# Patient Record
Sex: FEMALE | Race: WHITE | Employment: OTHER
[De-identification: names, ages, dates, MRNs, and addresses within clinical notes are randomized per-mention and may not be internally consistent; named-entity substitution may affect disease eponyms.]

---

## 2017-01-03 RX ORDER — HYDROCODONE BITARTRATE AND ACETAMINOPHEN 5; 325 MG/1; MG/1
1 TABLET ORAL EVERY 6 HOURS PRN
Qty: 40 TABLET | Refills: 0 | Status: SHIPPED | OUTPATIENT
Start: 2017-01-03 | End: 2017-02-13 | Stop reason: SDUPTHER

## 2017-02-13 RX ORDER — HYDROCODONE BITARTRATE AND ACETAMINOPHEN 5; 325 MG/1; MG/1
1 TABLET ORAL EVERY 6 HOURS PRN
Qty: 40 TABLET | Refills: 0 | Status: SHIPPED | OUTPATIENT
Start: 2017-02-13 | End: 2017-03-22 | Stop reason: SDUPTHER

## 2017-02-16 ENCOUNTER — OFFICE VISIT (OUTPATIENT)
Dept: ORTHOPEDIC SURGERY | Facility: CLINIC | Age: 72
End: 2017-02-16

## 2017-02-16 VITALS — WEIGHT: 170 LBS | BODY MASS INDEX: 29.02 KG/M2 | HEIGHT: 64 IN

## 2017-02-16 DIAGNOSIS — M17.11 PRIMARY OSTEOARTHRITIS OF RIGHT KNEE: Primary | ICD-10-CM

## 2017-02-16 PROCEDURE — 1090F PRES/ABSN URINE INCON ASSESS: CPT | Performed by: ORTHOPAEDIC SURGERY

## 2017-02-16 PROCEDURE — 1123F ACP DISCUSS/DSCN MKR DOCD: CPT | Performed by: ORTHOPAEDIC SURGERY

## 2017-02-16 PROCEDURE — 3017F COLORECTAL CA SCREEN DOC REV: CPT | Performed by: ORTHOPAEDIC SURGERY

## 2017-02-16 PROCEDURE — 4040F PNEUMOC VAC/ADMIN/RCVD: CPT | Performed by: ORTHOPAEDIC SURGERY

## 2017-02-16 PROCEDURE — 1036F TOBACCO NON-USER: CPT | Performed by: ORTHOPAEDIC SURGERY

## 2017-02-16 PROCEDURE — 3014F SCREEN MAMMO DOC REV: CPT | Performed by: ORTHOPAEDIC SURGERY

## 2017-02-16 PROCEDURE — G8427 DOCREV CUR MEDS BY ELIG CLIN: HCPCS | Performed by: ORTHOPAEDIC SURGERY

## 2017-02-16 PROCEDURE — G8400 PT W/DXA NO RESULTS DOC: HCPCS | Performed by: ORTHOPAEDIC SURGERY

## 2017-02-16 PROCEDURE — 99212 OFFICE O/P EST SF 10 MIN: CPT | Performed by: ORTHOPAEDIC SURGERY

## 2017-02-16 PROCEDURE — 20610 DRAIN/INJ JOINT/BURSA W/O US: CPT | Performed by: ORTHOPAEDIC SURGERY

## 2017-02-16 PROCEDURE — G8420 CALC BMI NORM PARAMETERS: HCPCS | Performed by: ORTHOPAEDIC SURGERY

## 2017-02-16 PROCEDURE — G8484 FLU IMMUNIZE NO ADMIN: HCPCS | Performed by: ORTHOPAEDIC SURGERY

## 2017-02-16 RX ORDER — METHYLPREDNISOLONE ACETATE 40 MG/ML
40 INJECTION, SUSPENSION INTRA-ARTICULAR; INTRALESIONAL; INTRAMUSCULAR; SOFT TISSUE ONCE
Status: COMPLETED | OUTPATIENT
Start: 2017-02-16 | End: 2017-02-16

## 2017-02-16 RX ADMIN — METHYLPREDNISOLONE ACETATE 40 MG: 40 INJECTION, SUSPENSION INTRA-ARTICULAR; INTRALESIONAL; INTRAMUSCULAR; SOFT TISSUE at 12:33

## 2017-03-22 RX ORDER — HYDROCODONE BITARTRATE AND ACETAMINOPHEN 5; 325 MG/1; MG/1
1 TABLET ORAL EVERY 6 HOURS PRN
Qty: 40 TABLET | Refills: 0 | Status: ON HOLD | OUTPATIENT
Start: 2017-03-22 | End: 2017-05-22 | Stop reason: HOSPADM

## 2017-03-28 ENCOUNTER — APPOINTMENT (OUTPATIENT)
Dept: GENERAL RADIOLOGY | Age: 72
End: 2017-03-28
Payer: MEDICARE

## 2017-03-28 ENCOUNTER — HOSPITAL ENCOUNTER (OUTPATIENT)
Dept: GENERAL RADIOLOGY | Age: 72
Discharge: HOME | End: 2017-03-28
Payer: MEDICARE

## 2017-03-28 ENCOUNTER — HOSPITAL ENCOUNTER (OUTPATIENT)
Age: 72
End: 2017-03-28
Payer: MEDICARE

## 2017-03-28 DIAGNOSIS — M17.11 PRIMARY OSTEOARTHRITIS OF RIGHT KNEE: ICD-10-CM

## 2017-03-28 PROCEDURE — 77073 BONE LENGTH STUDIES: CPT

## 2017-04-06 ENCOUNTER — OFFICE VISIT (OUTPATIENT)
Dept: ORTHOPEDIC SURGERY | Age: 72
End: 2017-04-06
Payer: MEDICARE

## 2017-04-06 VITALS — WEIGHT: 169.97 LBS | HEIGHT: 64 IN | BODY MASS INDEX: 29.02 KG/M2

## 2017-04-06 DIAGNOSIS — M17.11 PRIMARY OSTEOARTHRITIS OF RIGHT KNEE: Primary | ICD-10-CM

## 2017-04-06 PROCEDURE — 4040F PNEUMOC VAC/ADMIN/RCVD: CPT | Performed by: ORTHOPAEDIC SURGERY

## 2017-04-06 PROCEDURE — 1123F ACP DISCUSS/DSCN MKR DOCD: CPT | Performed by: ORTHOPAEDIC SURGERY

## 2017-04-06 PROCEDURE — G8420 CALC BMI NORM PARAMETERS: HCPCS | Performed by: ORTHOPAEDIC SURGERY

## 2017-04-06 PROCEDURE — 3017F COLORECTAL CA SCREEN DOC REV: CPT | Performed by: ORTHOPAEDIC SURGERY

## 2017-04-06 PROCEDURE — 1036F TOBACCO NON-USER: CPT | Performed by: ORTHOPAEDIC SURGERY

## 2017-04-06 PROCEDURE — 99213 OFFICE O/P EST LOW 20 MIN: CPT | Performed by: ORTHOPAEDIC SURGERY

## 2017-04-06 PROCEDURE — 1090F PRES/ABSN URINE INCON ASSESS: CPT | Performed by: ORTHOPAEDIC SURGERY

## 2017-04-06 PROCEDURE — 3014F SCREEN MAMMO DOC REV: CPT | Performed by: ORTHOPAEDIC SURGERY

## 2017-04-06 PROCEDURE — G8400 PT W/DXA NO RESULTS DOC: HCPCS | Performed by: ORTHOPAEDIC SURGERY

## 2017-04-06 PROCEDURE — G8427 DOCREV CUR MEDS BY ELIG CLIN: HCPCS | Performed by: ORTHOPAEDIC SURGERY

## 2017-04-25 ENCOUNTER — HOSPITAL ENCOUNTER (OUTPATIENT)
Age: 72
Discharge: HOME | End: 2017-04-25
Payer: MEDICARE

## 2017-05-03 ENCOUNTER — TELEPHONE (OUTPATIENT)
Dept: ORTHOPEDIC SURGERY | Age: 72
End: 2017-05-03

## 2017-05-10 ENCOUNTER — HOSPITAL ENCOUNTER (OUTPATIENT)
Age: 72
Discharge: HOME | End: 2017-05-10
Payer: MEDICARE

## 2017-05-10 ENCOUNTER — HOSPITAL ENCOUNTER (OUTPATIENT)
Dept: PREADMISSION TESTING | Age: 72
Discharge: HOME | End: 2017-05-10
Payer: MEDICARE

## 2017-05-10 VITALS
WEIGHT: 172.84 LBS | OXYGEN SATURATION: 98 % | HEART RATE: 84 BPM | RESPIRATION RATE: 18 BRPM | HEIGHT: 64 IN | TEMPERATURE: 98.1 F | BODY MASS INDEX: 29.51 KG/M2 | SYSTOLIC BLOOD PRESSURE: 118 MMHG | DIASTOLIC BLOOD PRESSURE: 72 MMHG

## 2017-05-10 LAB
ABO/RH: NORMAL
ABSOLUTE EOS #: 0.2 K/UL (ref 0–0.4)
ABSOLUTE LYMPH #: 1.4 K/UL (ref 1–4.8)
ABSOLUTE MONO #: 0.9 K/UL (ref 0.1–1.2)
ANION GAP SERPL CALCULATED.3IONS-SCNC: 14 MMOL/L (ref 9–17)
ANTIBODY SCREEN: NEGATIVE
ARM BAND NUMBER: NORMAL
BASOPHILS # BLD: 0 %
BASOPHILS ABSOLUTE: 0 K/UL (ref 0–0.2)
BUN BLDV-MCNC: 20 MG/DL (ref 8–23)
CHLORIDE BLD-SCNC: 101 MMOL/L (ref 98–107)
CO2: 25 MMOL/L (ref 20–31)
CREAT SERPL-MCNC: 1.03 MG/DL (ref 0.5–0.9)
DIFFERENTIAL TYPE: ABNORMAL
EOSINOPHILS RELATIVE PERCENT: 2 %
EXPIRATION DATE: NORMAL
GFR AFRICAN AMERICAN: >60 ML/MIN
GFR NON-AFRICAN AMERICAN: 53 ML/MIN
GFR SERPL CREATININE-BSD FRML MDRD: ABNORMAL ML/MIN/{1.73_M2}
GFR SERPL CREATININE-BSD FRML MDRD: ABNORMAL ML/MIN/{1.73_M2}
GLUCOSE BLD-MCNC: 100 MG/DL (ref 70–99)
HCT VFR BLD CALC: 33 % (ref 36–46)
HEMOGLOBIN: 11 G/DL (ref 12–16)
LYMPHOCYTES # BLD: 18 %
MCH RBC QN AUTO: 30.7 PG (ref 26–34)
MCHC RBC AUTO-ENTMCNC: 33.2 G/DL (ref 31–37)
MCV RBC AUTO: 92.5 FL (ref 80–100)
MONOCYTES # BLD: 12 %
PDW BLD-RTO: 15.9 % (ref 12.5–15.4)
PLATELET # BLD: 299 K/UL (ref 140–450)
PLATELET ESTIMATE: ABNORMAL
PMV BLD AUTO: 8.2 FL (ref 6–12)
POTASSIUM SERPL-SCNC: 5.1 MMOL/L (ref 3.7–5.3)
RBC # BLD: 3.57 M/UL (ref 4–5.2)
RBC # BLD: ABNORMAL 10*6/UL
SEG NEUTROPHILS: 68 %
SEGMENTED NEUTROPHILS ABSOLUTE COUNT: 5.1 K/UL (ref 1.8–7.7)
SODIUM BLD-SCNC: 140 MMOL/L (ref 135–144)
WBC # BLD: 7.6 K/UL (ref 3.5–11)
WBC # BLD: ABNORMAL 10*3/UL

## 2017-05-10 PROCEDURE — 86900 BLOOD TYPING SEROLOGIC ABO: CPT

## 2017-05-10 PROCEDURE — 82947 ASSAY GLUCOSE BLOOD QUANT: CPT

## 2017-05-10 PROCEDURE — 86901 BLOOD TYPING SEROLOGIC RH(D): CPT

## 2017-05-10 PROCEDURE — 84520 ASSAY OF UREA NITROGEN: CPT

## 2017-05-10 PROCEDURE — 80051 ELECTROLYTE PANEL: CPT

## 2017-05-10 PROCEDURE — 82565 ASSAY OF CREATININE: CPT

## 2017-05-10 PROCEDURE — 85025 COMPLETE CBC W/AUTO DIFF WBC: CPT

## 2017-05-10 PROCEDURE — 93005 ELECTROCARDIOGRAM TRACING: CPT

## 2017-05-10 PROCEDURE — 86850 RBC ANTIBODY SCREEN: CPT

## 2017-05-10 RX ORDER — SODIUM CHLORIDE, SODIUM LACTATE, POTASSIUM CHLORIDE, CALCIUM CHLORIDE 600; 310; 30; 20 MG/100ML; MG/100ML; MG/100ML; MG/100ML
1000 INJECTION, SOLUTION INTRAVENOUS CONTINUOUS
Status: CANCELLED | OUTPATIENT
Start: 2017-05-10

## 2017-05-10 ASSESSMENT — PAIN DESCRIPTION - ORIENTATION: ORIENTATION: RIGHT

## 2017-05-10 ASSESSMENT — PAIN SCALES - GENERAL: PAINLEVEL_OUTOF10: 4

## 2017-05-10 ASSESSMENT — PAIN DESCRIPTION - LOCATION: LOCATION: KNEE

## 2017-05-11 LAB
EKG ATRIAL RATE: 66 BPM
EKG P AXIS: 25 DEGREES
EKG P-R INTERVAL: 156 MS
EKG Q-T INTERVAL: 376 MS
EKG QRS DURATION: 82 MS
EKG QTC CALCULATION (BAZETT): 394 MS
EKG R AXIS: 26 DEGREES
EKG T AXIS: 53 DEGREES
EKG VENTRICULAR RATE: 66 BPM

## 2017-05-15 ENCOUNTER — TELEPHONE (OUTPATIENT)
Dept: CASE MANAGEMENT | Age: 72
End: 2017-05-15

## 2017-05-19 ENCOUNTER — ANESTHESIA (OUTPATIENT)
Dept: OPERATING ROOM | Age: 72
DRG: 470 | End: 2017-05-19
Payer: MEDICARE

## 2017-05-19 ENCOUNTER — ANESTHESIA EVENT (OUTPATIENT)
Dept: OPERATING ROOM | Age: 72
DRG: 470 | End: 2017-05-19
Payer: MEDICARE

## 2017-05-19 ENCOUNTER — APPOINTMENT (OUTPATIENT)
Dept: GENERAL RADIOLOGY | Age: 72
DRG: 470 | End: 2017-05-19
Attending: ORTHOPAEDIC SURGERY
Payer: MEDICARE

## 2017-05-19 ENCOUNTER — HOSPITAL ENCOUNTER (INPATIENT)
Age: 72
LOS: 3 days | Discharge: HOME | DRG: 470 | End: 2017-05-22
Attending: ORTHOPAEDIC SURGERY | Admitting: ORTHOPAEDIC SURGERY
Payer: MEDICARE

## 2017-05-19 VITALS — TEMPERATURE: 91.1 F | OXYGEN SATURATION: 96 % | DIASTOLIC BLOOD PRESSURE: 28 MMHG | SYSTOLIC BLOOD PRESSURE: 101 MMHG

## 2017-05-19 DIAGNOSIS — Z01.818 PRE-OP TESTING: ICD-10-CM

## 2017-05-19 LAB — POC POTASSIUM: 4.3 MMOL/L (ref 3.5–5.1)

## 2017-05-19 PROCEDURE — 2580000003 HC RX 258: Performed by: ORTHOPAEDIC SURGERY

## 2017-05-19 PROCEDURE — 7100000000 HC PACU RECOVERY - FIRST 15 MIN: Performed by: ORTHOPAEDIC SURGERY

## 2017-05-19 PROCEDURE — 0SRC0J9 REPLACEMENT OF RIGHT KNEE JOINT WITH SYNTHETIC SUBSTITUTE, CEMENTED, OPEN APPROACH: ICD-10-PCS | Performed by: ORTHOPAEDIC SURGERY

## 2017-05-19 PROCEDURE — 6370000000 HC RX 637 (ALT 250 FOR IP): Performed by: ORTHOPAEDIC SURGERY

## 2017-05-19 PROCEDURE — 2500000003 HC RX 250 WO HCPCS: Performed by: ORTHOPAEDIC SURGERY

## 2017-05-19 PROCEDURE — 84132 ASSAY OF SERUM POTASSIUM: CPT

## 2017-05-19 PROCEDURE — 87086 URINE CULTURE/COLONY COUNT: CPT

## 2017-05-19 PROCEDURE — 3600000004 HC SURGERY LEVEL 4 BASE: Performed by: ORTHOPAEDIC SURGERY

## 2017-05-19 PROCEDURE — 2580000003 HC RX 258: Performed by: NURSE ANESTHETIST, CERTIFIED REGISTERED

## 2017-05-19 PROCEDURE — 76942 ECHO GUIDE FOR BIOPSY: CPT | Performed by: ANESTHESIOLOGY

## 2017-05-19 PROCEDURE — 6360000002 HC RX W HCPCS: Performed by: ANESTHESIOLOGY

## 2017-05-19 PROCEDURE — 7100000001 HC PACU RECOVERY - ADDTL 15 MIN: Performed by: ORTHOPAEDIC SURGERY

## 2017-05-19 PROCEDURE — 6370000000 HC RX 637 (ALT 250 FOR IP): Performed by: NURSE ANESTHETIST, CERTIFIED REGISTERED

## 2017-05-19 PROCEDURE — C1713 ANCHOR/SCREW BN/BN,TIS/BN: HCPCS | Performed by: ORTHOPAEDIC SURGERY

## 2017-05-19 PROCEDURE — 2580000003 HC RX 258: Performed by: STUDENT IN AN ORGANIZED HEALTH CARE EDUCATION/TRAINING PROGRAM

## 2017-05-19 PROCEDURE — 2580000003 HC RX 258: Performed by: ANESTHESIOLOGY

## 2017-05-19 PROCEDURE — C1776 JOINT DEVICE (IMPLANTABLE): HCPCS | Performed by: ORTHOPAEDIC SURGERY

## 2017-05-19 PROCEDURE — 3600000014 HC SURGERY LEVEL 4 ADDTL 15MIN: Performed by: ORTHOPAEDIC SURGERY

## 2017-05-19 PROCEDURE — 73562 X-RAY EXAM OF KNEE 3: CPT

## 2017-05-19 PROCEDURE — 3700000000 HC ANESTHESIA ATTENDED CARE: Performed by: ORTHOPAEDIC SURGERY

## 2017-05-19 PROCEDURE — 6360000002 HC RX W HCPCS: Performed by: ORTHOPAEDIC SURGERY

## 2017-05-19 PROCEDURE — 3700000001 HC ADD 15 MINUTES (ANESTHESIA): Performed by: ORTHOPAEDIC SURGERY

## 2017-05-19 PROCEDURE — 6360000002 HC RX W HCPCS: Performed by: NURSE ANESTHETIST, CERTIFIED REGISTERED

## 2017-05-19 PROCEDURE — 1200000000 HC SEMI PRIVATE

## 2017-05-19 PROCEDURE — 2500000003 HC RX 250 WO HCPCS: Performed by: NURSE ANESTHETIST, CERTIFIED REGISTERED

## 2017-05-19 PROCEDURE — 2720000010 HC SURG SUPPLY STERILE: Performed by: ORTHOPAEDIC SURGERY

## 2017-05-19 PROCEDURE — 2500000003 HC RX 250 WO HCPCS: Performed by: ANESTHESIOLOGY

## 2017-05-19 DEVICE — IMPLANTABLE DEVICE: Type: IMPLANTABLE DEVICE | Status: FUNCTIONAL

## 2017-05-19 DEVICE — NEXGEN ALL-POLY PATELLA 32MM: Type: IMPLANTABLE DEVICE | Site: KNEE | Status: FUNCTIONAL

## 2017-05-19 DEVICE — NEXGEN PRECOAT STEMMED TIBIAL PLATE SZ 4: Type: IMPLANTABLE DEVICE | Site: KNEE | Status: FUNCTIONAL

## 2017-05-19 DEVICE — CEMENT BNE 40 GM RADIOPAQUE BA SIMPLEX P: Type: IMPLANTABLE DEVICE | Site: KNEE | Status: FUNCTIONAL

## 2017-05-19 DEVICE — IMPLANTABLE DEVICE: Type: IMPLANTABLE DEVICE | Site: KNEE | Status: FUNCTIONAL

## 2017-05-19 RX ORDER — FENTANYL CITRATE 50 UG/ML
INJECTION, SOLUTION INTRAMUSCULAR; INTRAVENOUS PRN
Status: DISCONTINUED | OUTPATIENT
Start: 2017-05-19 | End: 2017-05-19 | Stop reason: SDUPTHER

## 2017-05-19 RX ORDER — SODIUM CHLORIDE 0.9 % (FLUSH) 0.9 %
10 SYRINGE (ML) INJECTION EVERY 12 HOURS SCHEDULED
Status: DISCONTINUED | OUTPATIENT
Start: 2017-05-19 | End: 2017-05-22 | Stop reason: HOSPADM

## 2017-05-19 RX ORDER — LIDOCAINE HYDROCHLORIDE 10 MG/ML
INJECTION, SOLUTION EPIDURAL; INFILTRATION; INTRACAUDAL; PERINEURAL PRN
Status: DISCONTINUED | OUTPATIENT
Start: 2017-05-19 | End: 2017-05-19 | Stop reason: SDUPTHER

## 2017-05-19 RX ORDER — OXYCODONE HYDROCHLORIDE AND ACETAMINOPHEN 5; 325 MG/1; MG/1
2 TABLET ORAL EVERY 4 HOURS PRN
Status: DISCONTINUED | OUTPATIENT
Start: 2017-05-19 | End: 2017-05-20

## 2017-05-19 RX ORDER — ONDANSETRON 2 MG/ML
INJECTION INTRAMUSCULAR; INTRAVENOUS PRN
Status: DISCONTINUED | OUTPATIENT
Start: 2017-05-19 | End: 2017-05-19 | Stop reason: SDUPTHER

## 2017-05-19 RX ORDER — MIDAZOLAM HYDROCHLORIDE 1 MG/ML
2 INJECTION INTRAMUSCULAR; INTRAVENOUS ONCE
Status: COMPLETED | OUTPATIENT
Start: 2017-05-19 | End: 2017-05-19

## 2017-05-19 RX ORDER — SODIUM CHLORIDE, SODIUM LACTATE, POTASSIUM CHLORIDE, CALCIUM CHLORIDE 600; 310; 30; 20 MG/100ML; MG/100ML; MG/100ML; MG/100ML
1000 INJECTION, SOLUTION INTRAVENOUS CONTINUOUS
Status: DISCONTINUED | OUTPATIENT
Start: 2017-05-19 | End: 2017-05-19

## 2017-05-19 RX ORDER — ONDANSETRON 2 MG/ML
4 INJECTION INTRAMUSCULAR; INTRAVENOUS EVERY 6 HOURS PRN
Status: DISCONTINUED | OUTPATIENT
Start: 2017-05-19 | End: 2017-05-22 | Stop reason: HOSPADM

## 2017-05-19 RX ORDER — BUPROPION HYDROCHLORIDE 150 MG/1
300 TABLET ORAL EVERY MORNING
Status: DISCONTINUED | OUTPATIENT
Start: 2017-05-20 | End: 2017-05-22 | Stop reason: HOSPADM

## 2017-05-19 RX ORDER — MORPHINE SULFATE 2 MG/ML
2 INJECTION, SOLUTION INTRAMUSCULAR; INTRAVENOUS
Status: DISCONTINUED | OUTPATIENT
Start: 2017-05-19 | End: 2017-05-22 | Stop reason: HOSPADM

## 2017-05-19 RX ORDER — FENTANYL CITRATE 50 UG/ML
100 INJECTION, SOLUTION INTRAMUSCULAR; INTRAVENOUS ONCE
Status: COMPLETED | OUTPATIENT
Start: 2017-05-19 | End: 2017-05-19

## 2017-05-19 RX ORDER — SODIUM CHLORIDE 9 MG/ML
INJECTION, SOLUTION INTRAVENOUS CONTINUOUS
Status: DISCONTINUED | OUTPATIENT
Start: 2017-05-19 | End: 2017-05-21

## 2017-05-19 RX ORDER — FUROSEMIDE 20 MG/1
20 TABLET ORAL DAILY
Status: DISCONTINUED | OUTPATIENT
Start: 2017-05-19 | End: 2017-05-22 | Stop reason: HOSPADM

## 2017-05-19 RX ORDER — SCOLOPAMINE TRANSDERMAL SYSTEM 1 MG/1
1 PATCH, EXTENDED RELEASE TRANSDERMAL ONCE
Status: COMPLETED | OUTPATIENT
Start: 2017-05-19 | End: 2017-05-22

## 2017-05-19 RX ORDER — PROPOFOL 10 MG/ML
INJECTION, EMULSION INTRAVENOUS CONTINUOUS PRN
Status: DISCONTINUED | OUTPATIENT
Start: 2017-05-19 | End: 2017-05-19 | Stop reason: SDUPTHER

## 2017-05-19 RX ORDER — PROPOFOL 10 MG/ML
INJECTION, EMULSION INTRAVENOUS PRN
Status: DISCONTINUED | OUTPATIENT
Start: 2017-05-19 | End: 2017-05-19 | Stop reason: SDUPTHER

## 2017-05-19 RX ORDER — ONDANSETRON 2 MG/ML
4 INJECTION INTRAMUSCULAR; INTRAVENOUS
Status: DISCONTINUED | OUTPATIENT
Start: 2017-05-19 | End: 2017-05-19 | Stop reason: HOSPADM

## 2017-05-19 RX ORDER — ASPIRIN 81 MG/1
81 TABLET, CHEWABLE ORAL DAILY
Status: DISCONTINUED | OUTPATIENT
Start: 2017-05-19 | End: 2017-05-22 | Stop reason: HOSPADM

## 2017-05-19 RX ORDER — OXYCODONE HYDROCHLORIDE AND ACETAMINOPHEN 5; 325 MG/1; MG/1
1 TABLET ORAL EVERY 4 HOURS PRN
Status: DISCONTINUED | OUTPATIENT
Start: 2017-05-19 | End: 2017-05-20

## 2017-05-19 RX ORDER — BUPIVACAINE HYDROCHLORIDE AND EPINEPHRINE 5; 5 MG/ML; UG/ML
INJECTION, SOLUTION EPIDURAL; INTRACAUDAL; PERINEURAL PRN
Status: DISCONTINUED | OUTPATIENT
Start: 2017-05-19 | End: 2017-05-19 | Stop reason: HOSPADM

## 2017-05-19 RX ORDER — PROMETHAZINE HYDROCHLORIDE 25 MG/ML
12.5 INJECTION, SOLUTION INTRAMUSCULAR; INTRAVENOUS
Status: DISCONTINUED | OUTPATIENT
Start: 2017-05-19 | End: 2017-05-19 | Stop reason: HOSPADM

## 2017-05-19 RX ORDER — ACETAMINOPHEN 325 MG/1
650 TABLET ORAL EVERY 4 HOURS PRN
Status: DISCONTINUED | OUTPATIENT
Start: 2017-05-19 | End: 2017-05-22 | Stop reason: HOSPADM

## 2017-05-19 RX ORDER — LISINOPRIL 20 MG/1
20 TABLET ORAL DAILY
Status: DISCONTINUED | OUTPATIENT
Start: 2017-05-19 | End: 2017-05-22 | Stop reason: HOSPADM

## 2017-05-19 RX ORDER — SODIUM CHLORIDE 0.9 % (FLUSH) 0.9 %
10 SYRINGE (ML) INJECTION PRN
Status: DISCONTINUED | OUTPATIENT
Start: 2017-05-19 | End: 2017-05-22 | Stop reason: HOSPADM

## 2017-05-19 RX ORDER — DEXAMETHASONE SODIUM PHOSPHATE 10 MG/ML
INJECTION INTRAMUSCULAR; INTRAVENOUS PRN
Status: DISCONTINUED | OUTPATIENT
Start: 2017-05-19 | End: 2017-05-19 | Stop reason: SDUPTHER

## 2017-05-19 RX ORDER — EPHEDRINE SULFATE 50 MG/ML
INJECTION, SOLUTION INTRAVENOUS PRN
Status: DISCONTINUED | OUTPATIENT
Start: 2017-05-19 | End: 2017-05-19 | Stop reason: SDUPTHER

## 2017-05-19 RX ORDER — ALENDRONATE SODIUM 70 MG/1
70 TABLET ORAL
Status: DISCONTINUED | OUTPATIENT
Start: 2017-05-22 | End: 2017-05-22 | Stop reason: HOSPADM

## 2017-05-19 RX ORDER — DIPHENHYDRAMINE HYDROCHLORIDE 50 MG/ML
12.5 INJECTION INTRAMUSCULAR; INTRAVENOUS
Status: DISCONTINUED | OUTPATIENT
Start: 2017-05-19 | End: 2017-05-19 | Stop reason: HOSPADM

## 2017-05-19 RX ORDER — MAGNESIUM HYDROXIDE 1200 MG/15ML
LIQUID ORAL CONTINUOUS PRN
Status: DISCONTINUED | OUTPATIENT
Start: 2017-05-19 | End: 2017-05-19 | Stop reason: HOSPADM

## 2017-05-19 RX ORDER — SODIUM CHLORIDE, SODIUM LACTATE, POTASSIUM CHLORIDE, CALCIUM CHLORIDE 600; 310; 30; 20 MG/100ML; MG/100ML; MG/100ML; MG/100ML
INJECTION, SOLUTION INTRAVENOUS CONTINUOUS PRN
Status: DISCONTINUED | OUTPATIENT
Start: 2017-05-19 | End: 2017-05-19 | Stop reason: SDUPTHER

## 2017-05-19 RX ADMIN — EPHEDRINE SULFATE 10 MG: 50 INJECTION, SOLUTION INTRAMUSCULAR; INTRAVENOUS; SUBCUTANEOUS at 09:34

## 2017-05-19 RX ADMIN — Medication 550 ML: at 13:15

## 2017-05-19 RX ADMIN — FENTANYL CITRATE 100 MCG: 50 INJECTION, SOLUTION INTRAMUSCULAR; INTRAVENOUS at 08:08

## 2017-05-19 RX ADMIN — FENTANYL CITRATE 30 MCG: 50 INJECTION INTRAMUSCULAR; INTRAVENOUS at 09:00

## 2017-05-19 RX ADMIN — PROPOFOL 100 MG: 10 INJECTION, EMULSION INTRAVENOUS at 09:02

## 2017-05-19 RX ADMIN — PROPOFOL 100 MG: 10 INJECTION, EMULSION INTRAVENOUS at 09:30

## 2017-05-19 RX ADMIN — LIDOCAINE HYDROCHLORIDE 50 MG: 10 INJECTION, SOLUTION EPIDURAL; INFILTRATION; INTRACAUDAL; PERINEURAL at 09:00

## 2017-05-19 RX ADMIN — CEFAZOLIN SODIUM 1 G: 1 INJECTION, SOLUTION INTRAVENOUS at 18:07

## 2017-05-19 RX ADMIN — EPHEDRINE SULFATE 10 MG: 50 INJECTION, SOLUTION INTRAMUSCULAR; INTRAVENOUS; SUBCUTANEOUS at 10:00

## 2017-05-19 RX ADMIN — EPHEDRINE SULFATE 10 MG: 50 INJECTION, SOLUTION INTRAMUSCULAR; INTRAVENOUS; SUBCUTANEOUS at 11:40

## 2017-05-19 RX ADMIN — PROPOFOL 50 MG: 10 INJECTION, EMULSION INTRAVENOUS at 09:34

## 2017-05-19 RX ADMIN — EPHEDRINE SULFATE 10 MG: 50 INJECTION, SOLUTION INTRAMUSCULAR; INTRAVENOUS; SUBCUTANEOUS at 09:55

## 2017-05-19 RX ADMIN — DEXAMETHASONE SODIUM PHOSPHATE 10 MG: 10 INJECTION INTRAMUSCULAR; INTRAVENOUS at 09:35

## 2017-05-19 RX ADMIN — OXYCODONE HYDROCHLORIDE AND ACETAMINOPHEN 2 TABLET: 5; 325 TABLET ORAL at 22:44

## 2017-05-19 RX ADMIN — OXYCODONE HYDROCHLORIDE AND ACETAMINOPHEN 1 TABLET: 5; 325 TABLET ORAL at 19:03

## 2017-05-19 RX ADMIN — Medication 2 G: at 09:10

## 2017-05-19 RX ADMIN — PROPOFOL 125 MCG/KG/MIN: 10 INJECTION, EMULSION INTRAVENOUS at 09:00

## 2017-05-19 RX ADMIN — EPHEDRINE SULFATE 10 MG: 50 INJECTION, SOLUTION INTRAMUSCULAR; INTRAVENOUS; SUBCUTANEOUS at 09:23

## 2017-05-19 RX ADMIN — SODIUM CHLORIDE, POTASSIUM CHLORIDE, SODIUM LACTATE AND CALCIUM CHLORIDE 1000 ML: 600; 310; 30; 20 INJECTION, SOLUTION INTRAVENOUS at 13:05

## 2017-05-19 RX ADMIN — ONDANSETRON 4 MG: 2 INJECTION, SOLUTION INTRAMUSCULAR; INTRAVENOUS at 09:10

## 2017-05-19 RX ADMIN — MIDAZOLAM HYDROCHLORIDE 2 MG: 1 INJECTION, SOLUTION INTRAMUSCULAR; INTRAVENOUS at 08:08

## 2017-05-19 RX ADMIN — SODIUM CHLORIDE: 9 INJECTION, SOLUTION INTRAVENOUS at 22:44

## 2017-05-19 RX ADMIN — SODIUM CHLORIDE, POTASSIUM CHLORIDE, SODIUM LACTATE AND CALCIUM CHLORIDE 1000 ML: 600; 310; 30; 20 INJECTION, SOLUTION INTRAVENOUS at 07:33

## 2017-05-19 RX ADMIN — SODIUM CHLORIDE, POTASSIUM CHLORIDE, SODIUM LACTATE AND CALCIUM CHLORIDE: 600; 310; 30; 20 INJECTION, SOLUTION INTRAVENOUS at 09:10

## 2017-05-19 RX ADMIN — Medication 10 ML: at 20:01

## 2017-05-19 RX ADMIN — FENTANYL CITRATE 20 MCG: 50 INJECTION INTRAMUSCULAR; INTRAVENOUS at 08:59

## 2017-05-19 ASSESSMENT — PAIN SCALES - GENERAL
PAINLEVEL_OUTOF10: 7
PAINLEVEL_OUTOF10: 1
PAINLEVEL_OUTOF10: 0
PAINLEVEL_OUTOF10: 2
PAINLEVEL_OUTOF10: 1
PAINLEVEL_OUTOF10: 1
PAINLEVEL_OUTOF10: 5
PAINLEVEL_OUTOF10: 1
PAINLEVEL_OUTOF10: 4
PAINLEVEL_OUTOF10: 4
PAINLEVEL_OUTOF10: 5
PAINLEVEL_OUTOF10: 0
PAINLEVEL_OUTOF10: 4
PAINLEVEL_OUTOF10: 5
PAINLEVEL_OUTOF10: 0

## 2017-05-19 ASSESSMENT — PAIN DESCRIPTION - DESCRIPTORS
DESCRIPTORS: ACHING
DESCRIPTORS: ACHING
DESCRIPTORS: BURNING

## 2017-05-19 ASSESSMENT — PAIN DESCRIPTION - ORIENTATION
ORIENTATION: RIGHT

## 2017-05-19 ASSESSMENT — PAIN - FUNCTIONAL ASSESSMENT: PAIN_FUNCTIONAL_ASSESSMENT: 0-10

## 2017-05-19 ASSESSMENT — PAIN DESCRIPTION - PROGRESSION
CLINICAL_PROGRESSION: NOT CHANGED
CLINICAL_PROGRESSION: GRADUALLY WORSENING

## 2017-05-19 ASSESSMENT — PAIN DESCRIPTION - PAIN TYPE
TYPE: ACUTE PAIN;SURGICAL PAIN
TYPE: SURGICAL PAIN
TYPE: SURGICAL PAIN

## 2017-05-19 ASSESSMENT — PAIN DESCRIPTION - LOCATION
LOCATION: KNEE

## 2017-05-19 ASSESSMENT — PAIN DESCRIPTION - ONSET: ONSET: PROGRESSIVE

## 2017-05-19 ASSESSMENT — PAIN DESCRIPTION - FREQUENCY: FREQUENCY: CONTINUOUS

## 2017-05-20 LAB
ABSOLUTE EOS #: 0.13 K/UL (ref 0–0.4)
ABSOLUTE LYMPH #: 1.6 K/UL (ref 1–4.8)
ABSOLUTE MONO #: 1.6 K/UL (ref 0.1–0.8)
BASOPHILS # BLD: 0 %
BASOPHILS ABSOLUTE: 0 K/UL (ref 0–0.2)
CULTURE: NO GROWTH
CULTURE: NORMAL
DIFFERENTIAL TYPE: ABNORMAL
EOSINOPHILS RELATIVE PERCENT: 1 %
HCT VFR BLD CALC: 24.2 % (ref 36–46)
HEMOGLOBIN: 7.9 G/DL (ref 12–16)
LYMPHOCYTES # BLD: 12 %
Lab: NORMAL
MCH RBC QN AUTO: 30.6 PG (ref 26–34)
MCHC RBC AUTO-ENTMCNC: 32.6 G/DL (ref 31–37)
MCV RBC AUTO: 93.7 FL (ref 80–100)
MONOCYTES # BLD: 12 %
MORPHOLOGY: ABNORMAL
PDW BLD-RTO: 16.1 % (ref 12.5–15.4)
PLATELET # BLD: 220 K/UL (ref 140–450)
PLATELET ESTIMATE: ABNORMAL
PMV BLD AUTO: 8 FL (ref 6–12)
RBC # BLD: 2.58 M/UL (ref 4–5.2)
RBC # BLD: ABNORMAL 10*6/UL
SEG NEUTROPHILS: 75 %
SEGMENTED NEUTROPHILS ABSOLUTE COUNT: 9.97 K/UL (ref 1.8–7.7)
SPECIMEN DESCRIPTION: NORMAL
STATUS: NORMAL
WBC # BLD: 13.3 K/UL (ref 3.5–11)
WBC # BLD: ABNORMAL 10*3/UL

## 2017-05-20 PROCEDURE — G8988 SELF CARE GOAL STATUS: HCPCS

## 2017-05-20 PROCEDURE — 97116 GAIT TRAINING THERAPY: CPT

## 2017-05-20 PROCEDURE — 6370000000 HC RX 637 (ALT 250 FOR IP): Performed by: ORTHOPAEDIC SURGERY

## 2017-05-20 PROCEDURE — G8987 SELF CARE CURRENT STATUS: HCPCS

## 2017-05-20 PROCEDURE — 97166 OT EVAL MOD COMPLEX 45 MIN: CPT

## 2017-05-20 PROCEDURE — 97162 PT EVAL MOD COMPLEX 30 MIN: CPT

## 2017-05-20 PROCEDURE — 6360000002 HC RX W HCPCS: Performed by: ORTHOPAEDIC SURGERY

## 2017-05-20 PROCEDURE — 1200000000 HC SEMI PRIVATE

## 2017-05-20 PROCEDURE — 36415 COLL VENOUS BLD VENIPUNCTURE: CPT

## 2017-05-20 PROCEDURE — G8978 MOBILITY CURRENT STATUS: HCPCS

## 2017-05-20 PROCEDURE — 85025 COMPLETE CBC W/AUTO DIFF WBC: CPT

## 2017-05-20 PROCEDURE — 97530 THERAPEUTIC ACTIVITIES: CPT

## 2017-05-20 PROCEDURE — 97535 SELF CARE MNGMENT TRAINING: CPT

## 2017-05-20 PROCEDURE — G8979 MOBILITY GOAL STATUS: HCPCS

## 2017-05-20 RX ORDER — OXYCODONE HYDROCHLORIDE 5 MG/1
10 TABLET ORAL EVERY 4 HOURS PRN
Status: DISCONTINUED | OUTPATIENT
Start: 2017-05-20 | End: 2017-05-22 | Stop reason: HOSPADM

## 2017-05-20 RX ORDER — OXYCODONE HYDROCHLORIDE 5 MG/1
5 TABLET ORAL EVERY 4 HOURS PRN
Status: DISCONTINUED | OUTPATIENT
Start: 2017-05-20 | End: 2017-05-22 | Stop reason: HOSPADM

## 2017-05-20 RX ADMIN — CEFAZOLIN SODIUM 1 G: 1 INJECTION, SOLUTION INTRAVENOUS at 08:46

## 2017-05-20 RX ADMIN — OXYCODONE HYDROCHLORIDE AND ACETAMINOPHEN 2 TABLET: 5; 325 TABLET ORAL at 02:21

## 2017-05-20 RX ADMIN — OXYCODONE HYDROCHLORIDE AND ACETAMINOPHEN 2 TABLET: 5; 325 TABLET ORAL at 11:02

## 2017-05-20 RX ADMIN — ASPIRIN 81 MG: 81 TABLET, CHEWABLE ORAL at 08:46

## 2017-05-20 RX ADMIN — OXYCODONE HYDROCHLORIDE 10 MG: 5 TABLET ORAL at 22:33

## 2017-05-20 RX ADMIN — OXYCODONE HYDROCHLORIDE AND ACETAMINOPHEN 2 TABLET: 5; 325 TABLET ORAL at 14:44

## 2017-05-20 RX ADMIN — OXYCODONE HYDROCHLORIDE AND ACETAMINOPHEN 2 TABLET: 5; 325 TABLET ORAL at 07:12

## 2017-05-20 RX ADMIN — CEFAZOLIN SODIUM 1 G: 1 INJECTION, SOLUTION INTRAVENOUS at 00:30

## 2017-05-20 RX ADMIN — BUPROPION HYDROCHLORIDE 300 MG: 150 TABLET, EXTENDED RELEASE ORAL at 08:46

## 2017-05-20 RX ADMIN — LISINOPRIL 20 MG: 20 TABLET ORAL at 08:47

## 2017-05-20 RX ADMIN — ENOXAPARIN SODIUM 40 MG: 40 INJECTION SUBCUTANEOUS at 08:47

## 2017-05-20 RX ADMIN — ONDANSETRON 4 MG: 2 INJECTION, SOLUTION INTRAMUSCULAR; INTRAVENOUS at 15:16

## 2017-05-20 RX ADMIN — OXYCODONE HYDROCHLORIDE AND ACETAMINOPHEN 2 TABLET: 5; 325 TABLET ORAL at 18:24

## 2017-05-20 ASSESSMENT — PAIN DESCRIPTION - LOCATION
LOCATION: KNEE
LOCATION: KNEE

## 2017-05-20 ASSESSMENT — PAIN SCALES - GENERAL
PAINLEVEL_OUTOF10: 7
PAINLEVEL_OUTOF10: 8
PAINLEVEL_OUTOF10: 7
PAINLEVEL_OUTOF10: 9
PAINLEVEL_OUTOF10: 5
PAINLEVEL_OUTOF10: 8
PAINLEVEL_OUTOF10: 3
PAINLEVEL_OUTOF10: 6
PAINLEVEL_OUTOF10: 7
PAINLEVEL_OUTOF10: 7
PAINLEVEL_OUTOF10: 2
PAINLEVEL_OUTOF10: 6

## 2017-05-20 ASSESSMENT — PAIN DESCRIPTION - DESCRIPTORS: DESCRIPTORS: ACHING;SORE

## 2017-05-20 ASSESSMENT — PAIN DESCRIPTION - PAIN TYPE: TYPE: ACUTE PAIN;SURGICAL PAIN

## 2017-05-20 ASSESSMENT — PAIN DESCRIPTION - PROGRESSION: CLINICAL_PROGRESSION: NOT CHANGED

## 2017-05-20 ASSESSMENT — PAIN DESCRIPTION - ORIENTATION
ORIENTATION: RIGHT
ORIENTATION: RIGHT

## 2017-05-21 LAB
HCT VFR BLD CALC: 24.2 % (ref 36–46)
HEMOGLOBIN: 7.9 G/DL (ref 12–16)
MCH RBC QN AUTO: 30.8 PG (ref 26–34)
MCHC RBC AUTO-ENTMCNC: 32.8 G/DL (ref 31–37)
MCV RBC AUTO: 93.9 FL (ref 80–100)
PDW BLD-RTO: 15.6 % (ref 12.5–15.4)
PLATELET # BLD: 243 K/UL (ref 140–450)
PMV BLD AUTO: 7.9 FL (ref 6–12)
RBC # BLD: 2.58 M/UL (ref 4–5.2)
WBC # BLD: 14.5 K/UL (ref 3.5–11)

## 2017-05-21 PROCEDURE — 6360000002 HC RX W HCPCS: Performed by: ORTHOPAEDIC SURGERY

## 2017-05-21 PROCEDURE — 36415 COLL VENOUS BLD VENIPUNCTURE: CPT

## 2017-05-21 PROCEDURE — 97535 SELF CARE MNGMENT TRAINING: CPT

## 2017-05-21 PROCEDURE — 97116 GAIT TRAINING THERAPY: CPT

## 2017-05-21 PROCEDURE — 2580000003 HC RX 258: Performed by: STUDENT IN AN ORGANIZED HEALTH CARE EDUCATION/TRAINING PROGRAM

## 2017-05-21 PROCEDURE — 1200000000 HC SEMI PRIVATE

## 2017-05-21 PROCEDURE — 97530 THERAPEUTIC ACTIVITIES: CPT

## 2017-05-21 PROCEDURE — 85027 COMPLETE CBC AUTOMATED: CPT

## 2017-05-21 PROCEDURE — 6370000000 HC RX 637 (ALT 250 FOR IP): Performed by: ORTHOPAEDIC SURGERY

## 2017-05-21 PROCEDURE — 2580000003 HC RX 258: Performed by: ORTHOPAEDIC SURGERY

## 2017-05-21 PROCEDURE — 97110 THERAPEUTIC EXERCISES: CPT

## 2017-05-21 RX ADMIN — OXYCODONE HYDROCHLORIDE 10 MG: 5 TABLET ORAL at 13:24

## 2017-05-21 RX ADMIN — OXYCODONE HYDROCHLORIDE 10 MG: 5 TABLET ORAL at 21:34

## 2017-05-21 RX ADMIN — MORPHINE SULFATE 2 MG: 2 INJECTION, SOLUTION INTRAMUSCULAR; INTRAVENOUS at 02:31

## 2017-05-21 RX ADMIN — ENOXAPARIN SODIUM 40 MG: 40 INJECTION SUBCUTANEOUS at 09:05

## 2017-05-21 RX ADMIN — MORPHINE SULFATE 2 MG: 2 INJECTION, SOLUTION INTRAMUSCULAR; INTRAVENOUS at 00:43

## 2017-05-21 RX ADMIN — OXYCODONE HYDROCHLORIDE 10 MG: 5 TABLET ORAL at 17:33

## 2017-05-21 RX ADMIN — OXYCODONE HYDROCHLORIDE 10 MG: 5 TABLET ORAL at 09:05

## 2017-05-21 RX ADMIN — LISINOPRIL 20 MG: 20 TABLET ORAL at 09:05

## 2017-05-21 RX ADMIN — SODIUM CHLORIDE: 9 INJECTION, SOLUTION INTRAVENOUS at 00:43

## 2017-05-21 RX ADMIN — Medication 10 ML: at 21:34

## 2017-05-21 RX ADMIN — ASPIRIN 81 MG: 81 TABLET, CHEWABLE ORAL at 09:05

## 2017-05-21 RX ADMIN — BUPROPION HYDROCHLORIDE 300 MG: 150 TABLET, EXTENDED RELEASE ORAL at 09:05

## 2017-05-21 RX ADMIN — OXYCODONE HYDROCHLORIDE 10 MG: 5 TABLET ORAL at 03:53

## 2017-05-21 ASSESSMENT — PAIN DESCRIPTION - PAIN TYPE
TYPE: ACUTE PAIN;SURGICAL PAIN
TYPE: SURGICAL PAIN
TYPE: SURGICAL PAIN
TYPE: SURGICAL PAIN;ACUTE PAIN
TYPE: SURGICAL PAIN

## 2017-05-21 ASSESSMENT — PAIN DESCRIPTION - DESCRIPTORS
DESCRIPTORS: ACHING;DISCOMFORT

## 2017-05-21 ASSESSMENT — PAIN DESCRIPTION - PROGRESSION
CLINICAL_PROGRESSION: NOT CHANGED
CLINICAL_PROGRESSION: GRADUALLY IMPROVING

## 2017-05-21 ASSESSMENT — PAIN DESCRIPTION - ORIENTATION
ORIENTATION: RIGHT

## 2017-05-21 ASSESSMENT — PAIN SCALES - GENERAL
PAINLEVEL_OUTOF10: 7
PAINLEVEL_OUTOF10: 8
PAINLEVEL_OUTOF10: 7
PAINLEVEL_OUTOF10: 7
PAINLEVEL_OUTOF10: 8
PAINLEVEL_OUTOF10: 4
PAINLEVEL_OUTOF10: 3
PAINLEVEL_OUTOF10: 8
PAINLEVEL_OUTOF10: 7
PAINLEVEL_OUTOF10: 7
PAINLEVEL_OUTOF10: 4
PAINLEVEL_OUTOF10: 7
PAINLEVEL_OUTOF10: 4
PAINLEVEL_OUTOF10: 8
PAINLEVEL_OUTOF10: 4

## 2017-05-21 ASSESSMENT — PAIN DESCRIPTION - LOCATION
LOCATION: KNEE

## 2017-05-21 ASSESSMENT — PAIN DESCRIPTION - ONSET
ONSET: ON-GOING
ONSET: ON-GOING

## 2017-05-21 ASSESSMENT — PAIN DESCRIPTION - FREQUENCY
FREQUENCY: CONTINUOUS

## 2017-05-22 VITALS
BODY MASS INDEX: 29.53 KG/M2 | TEMPERATURE: 99 F | WEIGHT: 173 LBS | DIASTOLIC BLOOD PRESSURE: 65 MMHG | HEIGHT: 64 IN | OXYGEN SATURATION: 96 % | RESPIRATION RATE: 16 BRPM | HEART RATE: 94 BPM | SYSTOLIC BLOOD PRESSURE: 120 MMHG

## 2017-05-22 PROCEDURE — 2580000003 HC RX 258: Performed by: ORTHOPAEDIC SURGERY

## 2017-05-22 PROCEDURE — 6360000002 HC RX W HCPCS: Performed by: ORTHOPAEDIC SURGERY

## 2017-05-22 PROCEDURE — 97530 THERAPEUTIC ACTIVITIES: CPT

## 2017-05-22 PROCEDURE — 97116 GAIT TRAINING THERAPY: CPT

## 2017-05-22 PROCEDURE — 6370000000 HC RX 637 (ALT 250 FOR IP): Performed by: ORTHOPAEDIC SURGERY

## 2017-05-22 PROCEDURE — 97535 SELF CARE MNGMENT TRAINING: CPT

## 2017-05-22 PROCEDURE — 97110 THERAPEUTIC EXERCISES: CPT

## 2017-05-22 RX ORDER — OXYCODONE HYDROCHLORIDE 5 MG/1
5-10 TABLET ORAL EVERY 4 HOURS PRN
Qty: 60 TABLET | Refills: 0 | Status: SHIPPED | OUTPATIENT
Start: 2017-05-22 | End: 2017-05-29

## 2017-05-22 RX ORDER — OXYCODONE HYDROCHLORIDE AND ACETAMINOPHEN 5; 325 MG/1; MG/1
1 TABLET ORAL EVERY 4 HOURS PRN
Qty: 60 TABLET | Refills: 0 | Status: SHIPPED | OUTPATIENT
Start: 2017-05-22 | End: 2017-05-22 | Stop reason: HOSPADM

## 2017-05-22 RX ORDER — DOCUSATE SODIUM 100 MG/1
100 CAPSULE, LIQUID FILLED ORAL 2 TIMES DAILY PRN
Qty: 30 CAPSULE | Refills: 1 | Status: ON HOLD | OUTPATIENT
Start: 2017-05-22 | End: 2018-08-10

## 2017-05-22 RX ORDER — HYDROCODONE BITARTRATE AND ACETAMINOPHEN 5; 325 MG/1; MG/1
1 TABLET ORAL EVERY 4 HOURS PRN
Qty: 60 TABLET | Refills: 0 | Status: CANCELLED | OUTPATIENT
Start: 2017-05-22 | End: 2017-05-29

## 2017-05-22 RX ADMIN — Medication 10 ML: at 09:00

## 2017-05-22 RX ADMIN — OXYCODONE HYDROCHLORIDE 5 MG: 5 TABLET ORAL at 10:04

## 2017-05-22 RX ADMIN — OXYCODONE HYDROCHLORIDE 10 MG: 5 TABLET ORAL at 16:40

## 2017-05-22 RX ADMIN — OXYCODONE HYDROCHLORIDE 10 MG: 5 TABLET ORAL at 08:21

## 2017-05-22 RX ADMIN — ASPIRIN 81 MG: 81 TABLET, CHEWABLE ORAL at 08:25

## 2017-05-22 RX ADMIN — OXYCODONE HYDROCHLORIDE 10 MG: 5 TABLET ORAL at 13:02

## 2017-05-22 RX ADMIN — BUPROPION HYDROCHLORIDE 300 MG: 150 TABLET, EXTENDED RELEASE ORAL at 08:26

## 2017-05-22 RX ADMIN — MORPHINE SULFATE 2 MG: 2 INJECTION, SOLUTION INTRAMUSCULAR; INTRAVENOUS at 01:12

## 2017-05-22 RX ADMIN — ENOXAPARIN SODIUM 40 MG: 40 INJECTION SUBCUTANEOUS at 08:26

## 2017-05-22 RX ADMIN — ALENDRONATE SODIUM 70 MG: 70 TABLET ORAL at 08:21

## 2017-05-22 ASSESSMENT — PAIN SCALES - GENERAL
PAINLEVEL_OUTOF10: 7
PAINLEVEL_OUTOF10: 7
PAINLEVEL_OUTOF10: 5
PAINLEVEL_OUTOF10: 7
PAINLEVEL_OUTOF10: 5
PAINLEVEL_OUTOF10: 5

## 2017-05-22 ASSESSMENT — PAIN DESCRIPTION - PAIN TYPE: TYPE: ACUTE PAIN;SURGICAL PAIN

## 2017-05-22 ASSESSMENT — PAIN DESCRIPTION - ORIENTATION: ORIENTATION: RIGHT

## 2017-05-22 ASSESSMENT — PAIN DESCRIPTION - LOCATION: LOCATION: KNEE

## 2017-05-23 ENCOUNTER — TELEPHONE (OUTPATIENT)
Dept: CASE MANAGEMENT | Age: 72
End: 2017-05-23

## 2017-05-23 ENCOUNTER — CARE COORDINATION (OUTPATIENT)
Dept: CASE MANAGEMENT | Age: 72
End: 2017-05-23

## 2017-05-24 ENCOUNTER — TELEPHONE (OUTPATIENT)
Dept: ORTHOPEDIC SURGERY | Age: 72
End: 2017-05-24

## 2017-05-31 ENCOUNTER — CARE COORDINATION (OUTPATIENT)
Dept: CASE MANAGEMENT | Age: 72
End: 2017-05-31

## 2017-06-01 ENCOUNTER — OFFICE VISIT (OUTPATIENT)
Dept: ORTHOPEDIC SURGERY | Age: 72
End: 2017-06-01

## 2017-06-01 VITALS — BODY MASS INDEX: 29.53 KG/M2 | HEIGHT: 64 IN | WEIGHT: 173 LBS

## 2017-06-01 DIAGNOSIS — Z96.651 STATUS POST TOTAL RIGHT KNEE REPLACEMENT: Primary | ICD-10-CM

## 2017-06-01 PROCEDURE — 99024 POSTOP FOLLOW-UP VISIT: CPT | Performed by: ORTHOPAEDIC SURGERY

## 2017-06-01 RX ORDER — LISINOPRIL 10 MG/1
TABLET ORAL
Refills: 3 | COMMUNITY
Start: 2017-05-18 | End: 2019-06-13 | Stop reason: ALTCHOICE

## 2017-06-01 RX ORDER — OXYCODONE HYDROCHLORIDE 5 MG/1
5 TABLET ORAL 2 TIMES DAILY PRN
Qty: 50 TABLET | Refills: 0 | Status: SHIPPED | OUTPATIENT
Start: 2017-06-01 | End: 2017-08-02 | Stop reason: SDUPTHER

## 2017-06-01 RX ORDER — OXYCODONE HYDROCHLORIDE AND ACETAMINOPHEN 5; 325 MG/1; MG/1
1 TABLET ORAL 2 TIMES DAILY
Qty: 50 TABLET | Refills: 0 | Status: SHIPPED | OUTPATIENT
Start: 2017-06-01 | End: 2017-06-01 | Stop reason: CLARIF

## 2017-06-01 RX ORDER — HYDROCODONE BITARTRATE AND ACETAMINOPHEN 5; 325 MG/1; MG/1
1 TABLET ORAL 2 TIMES DAILY
Qty: 50 TABLET | Refills: 0 | Status: SHIPPED | OUTPATIENT
Start: 2017-06-01 | End: 2018-08-08

## 2017-06-07 ENCOUNTER — CARE COORDINATION (OUTPATIENT)
Dept: CASE MANAGEMENT | Age: 72
End: 2017-06-07

## 2017-06-15 ENCOUNTER — OFFICE VISIT (OUTPATIENT)
Dept: ORTHOPEDIC SURGERY | Age: 72
End: 2017-06-15

## 2017-06-15 VITALS — BODY MASS INDEX: 29.37 KG/M2 | WEIGHT: 172 LBS | HEIGHT: 64 IN

## 2017-06-15 DIAGNOSIS — M17.11 PRIMARY OSTEOARTHRITIS OF RIGHT KNEE: ICD-10-CM

## 2017-06-15 DIAGNOSIS — Z96.651 STATUS POST TOTAL RIGHT KNEE REPLACEMENT: Primary | ICD-10-CM

## 2017-06-15 PROCEDURE — 99024 POSTOP FOLLOW-UP VISIT: CPT | Performed by: ORTHOPAEDIC SURGERY

## 2017-06-16 ENCOUNTER — CARE COORDINATION (OUTPATIENT)
Dept: CASE MANAGEMENT | Age: 72
End: 2017-06-16

## 2017-06-22 ENCOUNTER — CARE COORDINATION (OUTPATIENT)
Dept: CASE MANAGEMENT | Age: 72
End: 2017-06-22

## 2017-06-28 ENCOUNTER — CARE COORDINATION (OUTPATIENT)
Dept: CASE MANAGEMENT | Age: 72
End: 2017-06-28

## 2017-07-07 ENCOUNTER — CARE COORDINATION (OUTPATIENT)
Dept: CASE MANAGEMENT | Age: 72
End: 2017-07-07

## 2017-07-13 ENCOUNTER — OFFICE VISIT (OUTPATIENT)
Dept: ORTHOPEDIC SURGERY | Age: 72
End: 2017-07-13

## 2017-07-13 VITALS — BODY MASS INDEX: 28 KG/M2 | HEIGHT: 64 IN | WEIGHT: 164 LBS

## 2017-07-13 DIAGNOSIS — Z96.651 STATUS POST TOTAL RIGHT KNEE REPLACEMENT: Primary | ICD-10-CM

## 2017-07-13 PROCEDURE — 99024 POSTOP FOLLOW-UP VISIT: CPT | Performed by: ORTHOPAEDIC SURGERY

## 2017-07-20 ENCOUNTER — CARE COORDINATION (OUTPATIENT)
Dept: CASE MANAGEMENT | Age: 72
End: 2017-07-20

## 2017-08-02 RX ORDER — OXYCODONE HYDROCHLORIDE 5 MG/1
5 TABLET ORAL 2 TIMES DAILY PRN
Qty: 50 TABLET | Refills: 0 | Status: SHIPPED | OUTPATIENT
Start: 2017-08-02 | End: 2017-09-21 | Stop reason: SDUPTHER

## 2017-08-07 ENCOUNTER — CARE COORDINATION (OUTPATIENT)
Dept: CASE MANAGEMENT | Age: 72
End: 2017-08-07

## 2017-08-09 ENCOUNTER — TELEPHONE (OUTPATIENT)
Dept: ORTHOPEDIC SURGERY | Age: 72
End: 2017-08-09

## 2017-08-17 ENCOUNTER — OFFICE VISIT (OUTPATIENT)
Dept: ORTHOPEDIC SURGERY | Age: 72
End: 2017-08-17

## 2017-08-17 VITALS — BODY MASS INDEX: 28 KG/M2 | WEIGHT: 164.02 LBS | HEIGHT: 64 IN

## 2017-08-17 DIAGNOSIS — M17.11 PRIMARY OSTEOARTHRITIS OF RIGHT KNEE: Primary | ICD-10-CM

## 2017-08-17 PROCEDURE — 99024 POSTOP FOLLOW-UP VISIT: CPT | Performed by: ORTHOPAEDIC SURGERY

## 2017-09-21 ENCOUNTER — OFFICE VISIT (OUTPATIENT)
Dept: ORTHOPEDIC SURGERY | Age: 72
End: 2017-09-21
Payer: MEDICARE

## 2017-09-21 VITALS — BODY MASS INDEX: 28 KG/M2 | WEIGHT: 164.02 LBS | HEIGHT: 64 IN

## 2017-09-21 DIAGNOSIS — M76.891 TENDINITIS OF RIGHT KNEE: Primary | ICD-10-CM

## 2017-09-21 PROCEDURE — 20550 NJX 1 TENDON SHEATH/LIGAMENT: CPT | Performed by: ORTHOPAEDIC SURGERY

## 2017-09-21 PROCEDURE — G8427 DOCREV CUR MEDS BY ELIG CLIN: HCPCS | Performed by: ORTHOPAEDIC SURGERY

## 2017-09-21 PROCEDURE — 3014F SCREEN MAMMO DOC REV: CPT | Performed by: ORTHOPAEDIC SURGERY

## 2017-09-21 PROCEDURE — G8400 PT W/DXA NO RESULTS DOC: HCPCS | Performed by: ORTHOPAEDIC SURGERY

## 2017-09-21 PROCEDURE — 1036F TOBACCO NON-USER: CPT | Performed by: ORTHOPAEDIC SURGERY

## 2017-09-21 PROCEDURE — 1090F PRES/ABSN URINE INCON ASSESS: CPT | Performed by: ORTHOPAEDIC SURGERY

## 2017-09-21 PROCEDURE — 99213 OFFICE O/P EST LOW 20 MIN: CPT | Performed by: ORTHOPAEDIC SURGERY

## 2017-09-21 PROCEDURE — 1123F ACP DISCUSS/DSCN MKR DOCD: CPT | Performed by: ORTHOPAEDIC SURGERY

## 2017-09-21 PROCEDURE — 3017F COLORECTAL CA SCREEN DOC REV: CPT | Performed by: ORTHOPAEDIC SURGERY

## 2017-09-21 PROCEDURE — G8417 CALC BMI ABV UP PARAM F/U: HCPCS | Performed by: ORTHOPAEDIC SURGERY

## 2017-09-21 PROCEDURE — 4040F PNEUMOC VAC/ADMIN/RCVD: CPT | Performed by: ORTHOPAEDIC SURGERY

## 2017-09-21 RX ORDER — FLUOROURACIL 50 MG/G
CREAM TOPICAL
COMMUNITY
Start: 2017-09-20 | End: 2020-01-17 | Stop reason: ALTCHOICE

## 2017-09-21 RX ORDER — OXYCODONE HYDROCHLORIDE 5 MG/1
5 TABLET ORAL 2 TIMES DAILY PRN
Qty: 20 TABLET | Refills: 0 | Status: SHIPPED | OUTPATIENT
Start: 2017-09-21 | End: 2017-10-16 | Stop reason: SDUPTHER

## 2017-09-21 RX ORDER — METHYLPREDNISOLONE ACETATE 40 MG/ML
40 INJECTION, SUSPENSION INTRA-ARTICULAR; INTRALESIONAL; INTRAMUSCULAR; SOFT TISSUE ONCE
Status: COMPLETED | OUTPATIENT
Start: 2017-09-21 | End: 2017-09-21

## 2017-09-21 RX ADMIN — METHYLPREDNISOLONE ACETATE 40 MG: 40 INJECTION, SUSPENSION INTRA-ARTICULAR; INTRALESIONAL; INTRAMUSCULAR; SOFT TISSUE at 14:07

## 2017-10-16 RX ORDER — OXYCODONE HYDROCHLORIDE 5 MG/1
5 TABLET ORAL 2 TIMES DAILY PRN
Qty: 20 TABLET | Refills: 0 | Status: SHIPPED | OUTPATIENT
Start: 2017-10-16 | End: 2017-11-07 | Stop reason: SDUPTHER

## 2017-10-16 NOTE — TELEPHONE ENCOUNTER
Patient calling about the knee pain. She stated the injection helped her for a few days then done. Any  suggestions on how long before the pain will go away.

## 2017-11-07 RX ORDER — OXYCODONE HYDROCHLORIDE 5 MG/1
5 TABLET ORAL 2 TIMES DAILY PRN
Qty: 20 TABLET | Refills: 0 | Status: SHIPPED | OUTPATIENT
Start: 2017-11-07 | End: 2017-11-28 | Stop reason: SDUPTHER

## 2017-11-28 RX ORDER — OXYCODONE HYDROCHLORIDE 5 MG/1
5 TABLET ORAL 2 TIMES DAILY PRN
Qty: 20 TABLET | Refills: 0 | Status: SHIPPED | OUTPATIENT
Start: 2017-11-28 | End: 2017-12-18 | Stop reason: SDUPTHER

## 2017-12-18 RX ORDER — OXYCODONE HYDROCHLORIDE 5 MG/1
5 TABLET ORAL 2 TIMES DAILY PRN
Qty: 20 TABLET | Refills: 0 | Status: SHIPPED | OUTPATIENT
Start: 2017-12-18 | End: 2018-01-08 | Stop reason: SDUPTHER

## 2018-01-08 DIAGNOSIS — Z96.651 HISTORY OF TOTAL RIGHT KNEE REPLACEMENT: Primary | ICD-10-CM

## 2018-01-09 RX ORDER — OXYCODONE HYDROCHLORIDE 5 MG/1
5 TABLET ORAL 2 TIMES DAILY PRN
Qty: 20 TABLET | Refills: 0 | Status: SHIPPED | OUTPATIENT
Start: 2018-01-09 | End: 2018-01-30 | Stop reason: SDUPTHER

## 2018-01-18 ENCOUNTER — OFFICE VISIT (OUTPATIENT)
Dept: ORTHOPEDIC SURGERY | Age: 73
End: 2018-01-18
Payer: MEDICARE

## 2018-01-18 VITALS — WEIGHT: 164.02 LBS | BODY MASS INDEX: 28 KG/M2 | HEIGHT: 64 IN

## 2018-01-18 DIAGNOSIS — Z96.651 HISTORY OF TOTAL RIGHT KNEE REPLACEMENT: ICD-10-CM

## 2018-01-18 DIAGNOSIS — M21.41 ACQUIRED BILATERAL FLAT FEET: Primary | ICD-10-CM

## 2018-01-18 DIAGNOSIS — M21.42 ACQUIRED BILATERAL FLAT FEET: Primary | ICD-10-CM

## 2018-01-18 PROCEDURE — 3017F COLORECTAL CA SCREEN DOC REV: CPT | Performed by: ORTHOPAEDIC SURGERY

## 2018-01-18 PROCEDURE — 1090F PRES/ABSN URINE INCON ASSESS: CPT | Performed by: ORTHOPAEDIC SURGERY

## 2018-01-18 PROCEDURE — 1036F TOBACCO NON-USER: CPT | Performed by: ORTHOPAEDIC SURGERY

## 2018-01-18 PROCEDURE — 4040F PNEUMOC VAC/ADMIN/RCVD: CPT | Performed by: ORTHOPAEDIC SURGERY

## 2018-01-18 PROCEDURE — G8417 CALC BMI ABV UP PARAM F/U: HCPCS | Performed by: ORTHOPAEDIC SURGERY

## 2018-01-18 PROCEDURE — 3014F SCREEN MAMMO DOC REV: CPT | Performed by: ORTHOPAEDIC SURGERY

## 2018-01-18 PROCEDURE — G8427 DOCREV CUR MEDS BY ELIG CLIN: HCPCS | Performed by: ORTHOPAEDIC SURGERY

## 2018-01-18 PROCEDURE — G8484 FLU IMMUNIZE NO ADMIN: HCPCS | Performed by: ORTHOPAEDIC SURGERY

## 2018-01-18 PROCEDURE — 99213 OFFICE O/P EST LOW 20 MIN: CPT | Performed by: ORTHOPAEDIC SURGERY

## 2018-01-18 PROCEDURE — G8400 PT W/DXA NO RESULTS DOC: HCPCS | Performed by: ORTHOPAEDIC SURGERY

## 2018-01-18 PROCEDURE — 1123F ACP DISCUSS/DSCN MKR DOCD: CPT | Performed by: ORTHOPAEDIC SURGERY

## 2018-01-18 NOTE — PROGRESS NOTES
This patient says that her right knee pain on the lateral side that she had his starting to get better. She was not doing as much exercises and was getting into her wrist and it improved. She still feels a little pain over the lateral side of the patella. The next concern she has is that her left foot is rolling out. This is more than before. Examination: She stands with bilateral plano while cusp deformity of the foot the left is worse than the right. Her ankle range of motion is excellent. Examination of the right knee shows there is a small benign lump just lateral to the superior pole of the patella which is mobile and nontender and nonadherent to the skin or underlying tissue. There is also pain across the lateral joint line at the IT band level. Her range of motion is from 0 to full flexion without any instability. Diagnosis: #1 status post right total knee arthroplasty. #2 painful IT band right knee. #3 small benign lump right knee. #4 bilateral plano valgus deformity of the feet. Treatment: I given her a prescription for orthotics and will see her again in 4 weeks' time. However should the orthotics help her out then she will call up and cancel the appointment and I will see her when necessary.

## 2018-01-30 DIAGNOSIS — Z96.651 HISTORY OF TOTAL RIGHT KNEE REPLACEMENT: ICD-10-CM

## 2018-01-30 NOTE — TELEPHONE ENCOUNTER
Date of Last Visit:02/15/2018   Date of Next Visit: 03/15/2018    Patient requesting medication refill and to use pharmacy that is listed or call when completed for p/up.

## 2018-01-31 RX ORDER — OXYCODONE HYDROCHLORIDE 5 MG/1
5 TABLET ORAL 2 TIMES DAILY PRN
Qty: 60 TABLET | Refills: 0 | Status: SHIPPED | OUTPATIENT
Start: 2018-01-31 | End: 2018-03-28 | Stop reason: SDUPTHER

## 2018-03-15 ENCOUNTER — OFFICE VISIT (OUTPATIENT)
Dept: ORTHOPEDIC SURGERY | Age: 73
End: 2018-03-15
Payer: MEDICARE

## 2018-03-15 VITALS — WEIGHT: 173 LBS | HEIGHT: 64 IN | BODY MASS INDEX: 29.53 KG/M2

## 2018-03-15 DIAGNOSIS — M21.072 VALGUS FOOT DEFORMITY, ACQUIRED, LEFT: Primary | ICD-10-CM

## 2018-03-15 DIAGNOSIS — T84.84XD PAIN DUE TO INTERNAL ORTHOPEDIC PROSTHETIC DEVICE, SUBSEQUENT ENCOUNTER: ICD-10-CM

## 2018-03-15 PROCEDURE — G8417 CALC BMI ABV UP PARAM F/U: HCPCS | Performed by: ORTHOPAEDIC SURGERY

## 2018-03-15 PROCEDURE — G8484 FLU IMMUNIZE NO ADMIN: HCPCS | Performed by: ORTHOPAEDIC SURGERY

## 2018-03-15 PROCEDURE — 1036F TOBACCO NON-USER: CPT | Performed by: ORTHOPAEDIC SURGERY

## 2018-03-15 PROCEDURE — G8427 DOCREV CUR MEDS BY ELIG CLIN: HCPCS | Performed by: ORTHOPAEDIC SURGERY

## 2018-03-15 PROCEDURE — 99212 OFFICE O/P EST SF 10 MIN: CPT | Performed by: ORTHOPAEDIC SURGERY

## 2018-03-15 PROCEDURE — 4040F PNEUMOC VAC/ADMIN/RCVD: CPT | Performed by: ORTHOPAEDIC SURGERY

## 2018-03-15 PROCEDURE — 1123F ACP DISCUSS/DSCN MKR DOCD: CPT | Performed by: ORTHOPAEDIC SURGERY

## 2018-03-15 PROCEDURE — 3014F SCREEN MAMMO DOC REV: CPT | Performed by: ORTHOPAEDIC SURGERY

## 2018-03-15 PROCEDURE — G8400 PT W/DXA NO RESULTS DOC: HCPCS | Performed by: ORTHOPAEDIC SURGERY

## 2018-03-15 PROCEDURE — 1090F PRES/ABSN URINE INCON ASSESS: CPT | Performed by: ORTHOPAEDIC SURGERY

## 2018-03-15 PROCEDURE — 3017F COLORECTAL CA SCREEN DOC REV: CPT | Performed by: ORTHOPAEDIC SURGERY

## 2018-03-15 NOTE — PROGRESS NOTES
This patient did go and get orthotics for both the sides. Unfortunately when she was using them in her tennis shoes which she normally uses they were riding back and forth and she mentioned this to the orthotist and he just walked out of the room that time. Examination: She is certainly continues to have pes planus deformity on standing. Her knee incision is well-healed and she has excellent motion. There is some superficial rash over the anterolateral aspect of the knee. However all over her body she has this white spots for which she has made an appointment to see the dermatologist.    As well as the knees concerned there is no evidence of any infection. She still gets a little pain but it is tolerable. Diagnosis: Status post right total knee arthroplasty. #2 has planus deformities of the feet. Treatment: I have given her a Hapad wedges to be using her tennis shoes and she will try them out without sticking them in and if it does help her then she will take the paper backing off and then stick them in. .  I will see her as necessary.

## 2018-03-28 DIAGNOSIS — Z96.651 HISTORY OF TOTAL RIGHT KNEE REPLACEMENT: ICD-10-CM

## 2018-03-28 RX ORDER — OXYCODONE HYDROCHLORIDE 5 MG/1
5 TABLET ORAL 2 TIMES DAILY PRN
Qty: 60 TABLET | Refills: 0 | Status: SHIPPED | OUTPATIENT
Start: 2018-03-28 | End: 2018-05-30 | Stop reason: SDUPTHER

## 2018-05-04 ASSESSMENT — KOOS JR
GOING UP OR DOWN STAIRS: 0
RISING FROM SITTING: 0
STRAIGHTENING KNEE FULLY: 0
STANDING UPRIGHT: 0
HOW SEVERE IS YOUR KNEE STIFFNESS AFTER FIRST WAKING IN MORNING: 0
BENDING TO THE FLOOR TO PICK UP OBJECT: 0
TWISING OR PIVOTING ON KNEE: 0

## 2018-05-04 ASSESSMENT — PROMIS GLOBAL HEALTH SCALE
IN GENERAL, WOULD YOU SAY YOUR QUALITY OF LIFE IS...[ON A SCALE OF 1 (POOR) TO 5 (EXCELLENT)]: 3
IN GENERAL, WOULD YOU SAY YOUR HEALTH IS...[ON A SCALE OF 1 (POOR) TO 5 (EXCELLENT)]: 3
IN GENERAL, HOW WOULD YOU RATE YOUR MENTAL HEALTH, INCLUDING YOUR MOOD AND YOUR ABILITY TO THINK [ON A SCALE OF 1 (POOR) TO 5 (EXCELLENT)]?: 3
IN GENERAL, HOW WOULD YOU RATE YOUR PHYSICAL HEALTH [ON A SCALE OF 1 (POOR) TO 5 (EXCELLENT)]?: 3

## 2018-05-30 DIAGNOSIS — Z96.651 HISTORY OF TOTAL RIGHT KNEE REPLACEMENT: ICD-10-CM

## 2018-05-30 RX ORDER — OXYCODONE HYDROCHLORIDE 5 MG/1
5 TABLET ORAL 2 TIMES DAILY PRN
Qty: 60 TABLET | Refills: 0 | Status: SHIPPED | OUTPATIENT
Start: 2018-05-30 | End: 2018-07-30 | Stop reason: SDUPTHER

## 2018-06-01 ENCOUNTER — TELEPHONE (OUTPATIENT)
Dept: CASE MANAGEMENT | Age: 73
End: 2018-06-01

## 2018-07-30 DIAGNOSIS — Z96.651 HISTORY OF TOTAL RIGHT KNEE REPLACEMENT: ICD-10-CM

## 2018-07-30 RX ORDER — OXYCODONE HYDROCHLORIDE 5 MG/1
5 TABLET ORAL 2 TIMES DAILY PRN
Qty: 60 TABLET | Refills: 0 | Status: ON HOLD | OUTPATIENT
Start: 2018-07-30 | End: 2018-08-10 | Stop reason: HOSPADM

## 2018-08-08 ENCOUNTER — HOSPITAL ENCOUNTER (EMERGENCY)
Facility: CLINIC | Age: 73
Discharge: HOME | End: 2018-08-08
Attending: EMERGENCY MEDICINE
Payer: MEDICARE

## 2018-08-08 ENCOUNTER — APPOINTMENT (OUTPATIENT)
Dept: GENERAL RADIOLOGY | Facility: CLINIC | Age: 73
End: 2018-08-08
Payer: MEDICARE

## 2018-08-08 VITALS
HEART RATE: 112 BPM | SYSTOLIC BLOOD PRESSURE: 162 MMHG | BODY MASS INDEX: 29.88 KG/M2 | HEIGHT: 64 IN | TEMPERATURE: 98.5 F | RESPIRATION RATE: 22 BRPM | WEIGHT: 175 LBS | OXYGEN SATURATION: 99 % | DIASTOLIC BLOOD PRESSURE: 90 MMHG

## 2018-08-08 DIAGNOSIS — S52.91XA CLOSED FRACTURE OF DISTAL END OF RIGHT FOREARM, INITIAL ENCOUNTER: Primary | ICD-10-CM

## 2018-08-08 PROCEDURE — 29125 APPL SHORT ARM SPLINT STATIC: CPT

## 2018-08-08 PROCEDURE — 73100 X-RAY EXAM OF WRIST: CPT

## 2018-08-08 PROCEDURE — 6370000000 HC RX 637 (ALT 250 FOR IP): Performed by: EMERGENCY MEDICINE

## 2018-08-08 PROCEDURE — 99283 EMERGENCY DEPT VISIT LOW MDM: CPT

## 2018-08-08 RX ORDER — OXYCODONE HYDROCHLORIDE AND ACETAMINOPHEN 5; 325 MG/1; MG/1
2 TABLET ORAL ONCE
Status: COMPLETED | OUTPATIENT
Start: 2018-08-08 | End: 2018-08-08

## 2018-08-08 RX ADMIN — OXYCODONE HYDROCHLORIDE AND ACETAMINOPHEN 2 TABLET: 5; 325 TABLET ORAL at 15:45

## 2018-08-08 ASSESSMENT — ENCOUNTER SYMPTOMS
COUGH: 0
VOMITING: 0
SHORTNESS OF BREATH: 0
BACK PAIN: 1
CONSTIPATION: 0
EYE PAIN: 0
DIARRHEA: 0
BLOOD IN STOOL: 0
NAUSEA: 0
ABDOMINAL PAIN: 0

## 2018-08-08 ASSESSMENT — PAIN SCALES - GENERAL
PAINLEVEL_OUTOF10: 10
PAINLEVEL_OUTOF10: 10
PAINLEVEL_OUTOF10: 8

## 2018-08-08 ASSESSMENT — PAIN DESCRIPTION - DESCRIPTORS: DESCRIPTORS: SHARP;SHOOTING

## 2018-08-08 ASSESSMENT — PAIN DESCRIPTION - ONSET: ONSET: SUDDEN

## 2018-08-08 ASSESSMENT — PAIN DESCRIPTION - LOCATION: LOCATION: WRIST

## 2018-08-08 ASSESSMENT — PAIN DESCRIPTION - ORIENTATION: ORIENTATION: RIGHT

## 2018-08-08 ASSESSMENT — PAIN DESCRIPTION - PAIN TYPE: TYPE: ACUTE PAIN

## 2018-08-08 ASSESSMENT — PAIN DESCRIPTION - FREQUENCY: FREQUENCY: CONTINUOUS

## 2018-08-08 NOTE — ED PROVIDER NOTES
[facility]  [address] [address]  Phone: [phone]        Pt Name: Perico Das  MRN: [id number]  [place] 1945  Date of evaluation: 8/8/18      CHIEF COMPLAINT       Chief Complaint   Patient presents with    Wrist Injury     right wrist, fell backwards and tried to catch herself. right wrist pain, deformity noted. HISTORY OF PRESENT ILLNESS    Perico Das is a 71 y.o. female who presents Chief complaint right wrist pain, she was digging weeds out of the shovel as [place], she lost her balance falling backwards catching herself on her right wrist she said she had a sent out a concrete the only pain she has is her right wrist she iced it called orthopedist Dr. Fer Quiroga and came in here pains worse with movement better with rest and ice is no pain at the elbow or shoulder she does take chronic Roxicodone but did not take any before coming in today      REVIEW OF SYSTEMS         Review of Systems   Constitutional: Negative for chills and fever. HENT: Negative for congestion and ear pain. Eyes: Negative for pain and visual disturbance. Respiratory: Negative for cough and shortness of breath. Cardiovascular: Negative for chest pain, palpitations and leg swelling. Gastrointestinal: Negative for abdominal pain, blood in stool, constipation, diarrhea, nausea and vomiting. Endocrine: Negative for polydipsia and polyuria. Genitourinary: Negative for difficulty urinating, dysuria, frequency, vaginal bleeding and vaginal discharge. Musculoskeletal: Positive for back pain. Negative for joint swelling, myalgias, neck pain and neck stiffness. She has chronic back pain, new right wrist injury   Skin: Negative for rash. Neurological: Negative for dizziness, weakness and headaches. Hematological: Negative for adenopathy. Does not bruise/bleed easily. Psychiatric/Behavioral: Negative for confusion, self-injury and suicidal ideas.          PAST MEDICAL HISTORY    has a past medical history of Arthritis; Cancer ([facility] [address] Chronic back pain; Depression; Eczema; Hypertension; Hyponatremia; Iron deficiency anemia; Knee pain, right; PONV (postoperative nausea and vomiting); Rotator cuff disorder; Snores; and Wears glasses. SURGICAL HISTORY      has a past surgical history that includes Ankle surgery (Left, 14); Ankle arthroscopy (Left, 2015); Dilation and curettage of uterus; Skin cancer excision (Left, 2017); eye surgery (Bilateral, 2017); knee surgery (Right, 2017); and Total knee arthroplasty (Right, 2017). CURRENT MEDICATIONS       Previous Medications    ALENDRONATE (FOSAMAX) 70 MG TABLET    Take 70 mg by mouth every 7 days     ASPIRIN 81 MG TABLET    Take 81 mg by mouth daily. BUPROPION (WELLBUTRIN XL) 300 MG XL TABLET    Take 300 mg by mouth every morning     DOCUSATE SODIUM (COLACE) 100 MG CAPSULE    Take 1 capsule by mouth 2 times daily as needed for Constipation    FLUOROURACIL (EFUDEX) 5 % CREAM        FUROSEMIDE (LASIX) 20 MG TABLET    Take 20 mg by mouth daily     HANDICAP PLACARD MISC    by Does not apply route Disability parking for 5 years    LISINOPRIL (PRINIVIL;ZESTRIL) 10 MG TABLET    TAKE 1 TABLET BY MOUTH DAILY    OXYCODONE (ROXICODONE) 5 MG IMMEDIATE RELEASE TABLET    Take 1 tablet by mouth 2 times daily as needed for Pain for up to 30 days. Alin Ramirez TRIAZOLAM (HALCION) 0.25 MG TABLET    TAKE 1 TABLET BY MOUTH 12 HOURS BEFORE PROCEDURE AND 1 TAB 1 HOUR BEFORE APPOINTMENT * DO NOT DRIVE*       ALLERGIES     has No Known Allergies. FAMILY HISTORY     indicated that her mother is . She indicated that her father is . She indicated that her sister is alive. She indicated that her brother is alive. family history includes Heart Disease in her mother. SOCIAL HISTORY      reports that she quit smoking about 2 years ago. Her smoking use included Cigarettes. She has a 51.00 pack-year smoking history. She has never used smokeless tobacco. She reports that she drinks alcohol. She reports that she does not use drugs. PHYSICAL EXAM     INITIAL VITALS:  height is 5' 4\" (1.626 m) and weight is 79.4 kg (175 lb). Her oral temperature is 98.5 °F (36.9 °C). Her blood pressure is 162/90 (abnormal) and her pulse is 112. Her respiration is 22 and oxygen saturation is 99%. Physical Exam   Constitutional: She is oriented to person, place, and time. She appears well-developed and well-nourished. No distress. Tearful but in no acute distress   HENT:   Head: Normocephalic and atraumatic. Eyes: Conjunctivae and EOM are normal. Pupils are equal, round, and reactive to light. Neck: Normal range of motion. Cardiovascular: Normal rate and regular rhythm. Pulmonary/Chest: Effort normal and breath sounds normal.   Abdominal: Soft. Bowel sounds are normal.   Musculoskeletal: She exhibits edema and tenderness. Patient has edema and swelling at the wrist is no deformity of the hand noted to the elbow or shoulder neurovascular status is intact, there is no break to the skin   Neurological: She is alert and oriented to person, place, and time. Skin: Skin is warm and dry. She is not diaphoretic. Psychiatric: She has a normal mood and affect.  Her behavior is normal.       DIFFERENTIAL DIAGNOSIS/ MDM:     Probable wrist fracture will give her some pain medication and ice and obtain an x-ray    DIAGNOSTIC RESULTS     EKG: All EKG's are interpreted by the Emergency Department Physician who either signs or Co-signs this chart in the absence of a cardiologist.        RADIOLOGY:   Non-plain film images such as CT, Ultrasound and MRI are read by the radiologist. Plain radiographic images are visualized and the radiologist interpretations are reviewed as follows:        EXAMINATION:   2 XRAY VIEWS OF THE RIGHT WRIST       8/8/2018 3:36 pm       COMPARISON:   None.       HISTORY:   ORDERING SYSTEM PROVIDED HISTORY: fall, as possible for a visit in 2 days        DISCHARGE MEDICATIONS:  New Prescriptions    No medications on file       (Please note that portions of this note were completed with a voice recognition program.  Efforts were made to edit the dictations but occasionally words are mis-transcribed.)    [place] MD, F.A.A.E.M.   Attending Emergency Medicine Physician        Haider Ontiveros MD  08/08/18 [id number]

## 2018-08-09 ENCOUNTER — OFFICE VISIT (OUTPATIENT)
Dept: ORTHOPEDIC SURGERY | Age: 73
End: 2018-08-09
Payer: MEDICARE

## 2018-08-09 VITALS — WEIGHT: 175.04 LBS | BODY MASS INDEX: 29.88 KG/M2 | HEIGHT: 64 IN

## 2018-08-09 DIAGNOSIS — S52.571A CLOSED DIE-PUNCH FRACTURE OF RIGHT RADIUS, INITIAL ENCOUNTER: Primary | ICD-10-CM

## 2018-08-09 PROCEDURE — 3017F COLORECTAL CA SCREEN DOC REV: CPT | Performed by: ORTHOPAEDIC SURGERY

## 2018-08-09 PROCEDURE — 4040F PNEUMOC VAC/ADMIN/RCVD: CPT | Performed by: ORTHOPAEDIC SURGERY

## 2018-08-09 PROCEDURE — 1036F TOBACCO NON-USER: CPT | Performed by: ORTHOPAEDIC SURGERY

## 2018-08-09 PROCEDURE — G8417 CALC BMI ABV UP PARAM F/U: HCPCS | Performed by: ORTHOPAEDIC SURGERY

## 2018-08-09 PROCEDURE — G8400 PT W/DXA NO RESULTS DOC: HCPCS | Performed by: ORTHOPAEDIC SURGERY

## 2018-08-09 PROCEDURE — 99212 OFFICE O/P EST SF 10 MIN: CPT | Performed by: ORTHOPAEDIC SURGERY

## 2018-08-09 PROCEDURE — G8427 DOCREV CUR MEDS BY ELIG CLIN: HCPCS | Performed by: ORTHOPAEDIC SURGERY

## 2018-08-09 PROCEDURE — 1123F ACP DISCUSS/DSCN MKR DOCD: CPT | Performed by: ORTHOPAEDIC SURGERY

## 2018-08-09 PROCEDURE — 1101F PT FALLS ASSESS-DOCD LE1/YR: CPT | Performed by: ORTHOPAEDIC SURGERY

## 2018-08-09 PROCEDURE — 1090F PRES/ABSN URINE INCON ASSESS: CPT | Performed by: ORTHOPAEDIC SURGERY

## 2018-08-09 RX ORDER — CYCLOBENZAPRINE HCL 10 MG
TABLET ORAL
COMMUNITY
End: 2019-12-03

## 2018-08-10 ENCOUNTER — ANESTHESIA EVENT (OUTPATIENT)
Dept: OPERATING ROOM | Age: 73
End: 2018-08-10
Payer: MEDICARE

## 2018-08-10 ENCOUNTER — APPOINTMENT (OUTPATIENT)
Dept: GENERAL RADIOLOGY | Age: 73
End: 2018-08-10
Attending: ORTHOPAEDIC SURGERY
Payer: MEDICARE

## 2018-08-10 ENCOUNTER — ANESTHESIA (OUTPATIENT)
Dept: OPERATING ROOM | Age: 73
End: 2018-08-10
Payer: MEDICARE

## 2018-08-10 ENCOUNTER — HOSPITAL ENCOUNTER (OUTPATIENT)
Age: 73
Setting detail: OUTPATIENT SURGERY
Discharge: HOME | End: 2018-08-10
Attending: ORTHOPAEDIC SURGERY | Admitting: ORTHOPAEDIC SURGERY
Payer: MEDICARE

## 2018-08-10 VITALS
TEMPERATURE: 97.9 F | WEIGHT: 170 LBS | HEART RATE: 77 BPM | BODY MASS INDEX: 29.02 KG/M2 | HEIGHT: 64 IN | DIASTOLIC BLOOD PRESSURE: 60 MMHG | RESPIRATION RATE: 16 BRPM | OXYGEN SATURATION: 95 % | SYSTOLIC BLOOD PRESSURE: 97 MMHG

## 2018-08-10 VITALS — DIASTOLIC BLOOD PRESSURE: 50 MMHG | OXYGEN SATURATION: 96 % | SYSTOLIC BLOOD PRESSURE: 82 MMHG

## 2018-08-10 DIAGNOSIS — G89.18 POST-OP PAIN: Primary | ICD-10-CM

## 2018-08-10 LAB
GLUCOSE BLD-MCNC: 89 MG/DL (ref 74–100)
POC POTASSIUM: 4.6 MMOL/L (ref 3.5–4.5)

## 2018-08-10 PROCEDURE — 82947 ASSAY GLUCOSE BLOOD QUANT: CPT

## 2018-08-10 PROCEDURE — C1713 ANCHOR/SCREW BN/BN,TIS/BN: HCPCS | Performed by: ORTHOPAEDIC SURGERY

## 2018-08-10 PROCEDURE — 73110 X-RAY EXAM OF WRIST: CPT

## 2018-08-10 PROCEDURE — 2580000003 HC RX 258: Performed by: ORTHOPAEDIC SURGERY

## 2018-08-10 PROCEDURE — 2500000003 HC RX 250 WO HCPCS: Performed by: NURSE ANESTHETIST, CERTIFIED REGISTERED

## 2018-08-10 PROCEDURE — 84132 ASSAY OF SERUM POTASSIUM: CPT

## 2018-08-10 PROCEDURE — 2500000003 HC RX 250 WO HCPCS: Performed by: ORTHOPAEDIC SURGERY

## 2018-08-10 PROCEDURE — 6360000002 HC RX W HCPCS: Performed by: NURSE ANESTHETIST, CERTIFIED REGISTERED

## 2018-08-10 PROCEDURE — 7100000040 HC SPAR PHASE II RECOVERY - FIRST 15 MIN: Performed by: ORTHOPAEDIC SURGERY

## 2018-08-10 PROCEDURE — 3700000000 HC ANESTHESIA ATTENDED CARE: Performed by: ORTHOPAEDIC SURGERY

## 2018-08-10 PROCEDURE — 3700000001 HC ADD 15 MINUTES (ANESTHESIA): Performed by: ORTHOPAEDIC SURGERY

## 2018-08-10 PROCEDURE — 7100000041 HC SPAR PHASE II RECOVERY - ADDTL 15 MIN: Performed by: ORTHOPAEDIC SURGERY

## 2018-08-10 PROCEDURE — 6360000002 HC RX W HCPCS: Performed by: STUDENT IN AN ORGANIZED HEALTH CARE EDUCATION/TRAINING PROGRAM

## 2018-08-10 PROCEDURE — 64415 NJX AA&/STRD BRCH PLXS IMG: CPT | Performed by: ANESTHESIOLOGY

## 2018-08-10 PROCEDURE — 2709999900 HC NON-CHARGEABLE SUPPLY: Performed by: ORTHOPAEDIC SURGERY

## 2018-08-10 PROCEDURE — 2500000003 HC RX 250 WO HCPCS: Performed by: ANESTHESIOLOGY

## 2018-08-10 PROCEDURE — 3600000004 HC SURGERY LEVEL 4 BASE: Performed by: ORTHOPAEDIC SURGERY

## 2018-08-10 PROCEDURE — 6360000002 HC RX W HCPCS

## 2018-08-10 PROCEDURE — 2580000003 HC RX 258: Performed by: NURSE ANESTHETIST, CERTIFIED REGISTERED

## 2018-08-10 PROCEDURE — 3600000014 HC SURGERY LEVEL 4 ADDTL 15MIN: Performed by: ORTHOPAEDIC SURGERY

## 2018-08-10 DEVICE — WIRE FIX L152MM DIA16MM S STL 2 DMND PNT K: Type: IMPLANTABLE DEVICE | Status: FUNCTIONAL

## 2018-08-10 RX ORDER — SODIUM CHLORIDE, SODIUM LACTATE, POTASSIUM CHLORIDE, CALCIUM CHLORIDE 600; 310; 30; 20 MG/100ML; MG/100ML; MG/100ML; MG/100ML
INJECTION, SOLUTION INTRAVENOUS CONTINUOUS PRN
Status: DISCONTINUED | OUTPATIENT
Start: 2018-08-10 | End: 2018-08-10 | Stop reason: SDUPTHER

## 2018-08-10 RX ORDER — FENTANYL CITRATE 50 UG/ML
INJECTION, SOLUTION INTRAMUSCULAR; INTRAVENOUS
Status: COMPLETED
Start: 2018-08-10 | End: 2018-08-10

## 2018-08-10 RX ORDER — MIDAZOLAM HYDROCHLORIDE 1 MG/ML
INJECTION INTRAMUSCULAR; INTRAVENOUS
Status: COMPLETED
Start: 2018-08-10 | End: 2018-08-10

## 2018-08-10 RX ORDER — FENTANYL CITRATE 50 UG/ML
100 INJECTION, SOLUTION INTRAMUSCULAR; INTRAVENOUS ONCE
Status: COMPLETED | OUTPATIENT
Start: 2018-08-10 | End: 2018-08-10

## 2018-08-10 RX ORDER — FENTANYL CITRATE 50 UG/ML
INJECTION, SOLUTION INTRAMUSCULAR; INTRAVENOUS PRN
Status: DISCONTINUED | OUTPATIENT
Start: 2018-08-10 | End: 2018-08-10 | Stop reason: SDUPTHER

## 2018-08-10 RX ORDER — BUPIVACAINE HYDROCHLORIDE 5 MG/ML
25 INJECTION, SOLUTION EPIDURAL; INTRACAUDAL ONCE
Status: COMPLETED | OUTPATIENT
Start: 2018-08-10 | End: 2018-08-10

## 2018-08-10 RX ORDER — DIPHENHYDRAMINE HYDROCHLORIDE 50 MG/ML
INJECTION INTRAMUSCULAR; INTRAVENOUS PRN
Status: DISCONTINUED | OUTPATIENT
Start: 2018-08-10 | End: 2018-08-10 | Stop reason: SDUPTHER

## 2018-08-10 RX ORDER — PROPOFOL 10 MG/ML
INJECTION, EMULSION INTRAVENOUS PRN
Status: DISCONTINUED | OUTPATIENT
Start: 2018-08-10 | End: 2018-08-10 | Stop reason: SDUPTHER

## 2018-08-10 RX ORDER — MIDAZOLAM HYDROCHLORIDE 1 MG/ML
2 INJECTION INTRAMUSCULAR; INTRAVENOUS ONCE
Status: COMPLETED | OUTPATIENT
Start: 2018-08-10 | End: 2018-08-10

## 2018-08-10 RX ORDER — OXYCODONE HYDROCHLORIDE AND ACETAMINOPHEN 5; 325 MG/1; MG/1
1 TABLET ORAL EVERY 6 HOURS PRN
Qty: 28 TABLET | Refills: 0 | Status: SHIPPED | OUTPATIENT
Start: 2018-08-10 | End: 2018-08-17

## 2018-08-10 RX ORDER — BUPIVACAINE HYDROCHLORIDE 5 MG/ML
INJECTION, SOLUTION EPIDURAL; INTRACAUDAL PRN
Status: DISCONTINUED | OUTPATIENT
Start: 2018-08-10 | End: 2018-08-10 | Stop reason: HOSPADM

## 2018-08-10 RX ORDER — MAGNESIUM HYDROXIDE 1200 MG/15ML
LIQUID ORAL CONTINUOUS PRN
Status: DISCONTINUED | OUTPATIENT
Start: 2018-08-10 | End: 2018-08-10 | Stop reason: HOSPADM

## 2018-08-10 RX ORDER — PROPOFOL 10 MG/ML
INJECTION, EMULSION INTRAVENOUS CONTINUOUS PRN
Status: DISCONTINUED | OUTPATIENT
Start: 2018-08-10 | End: 2018-08-10 | Stop reason: SDUPTHER

## 2018-08-10 RX ORDER — LIDOCAINE HYDROCHLORIDE 10 MG/ML
INJECTION, SOLUTION EPIDURAL; INFILTRATION; INTRACAUDAL; PERINEURAL PRN
Status: DISCONTINUED | OUTPATIENT
Start: 2018-08-10 | End: 2018-08-10 | Stop reason: SDUPTHER

## 2018-08-10 RX ORDER — ACETAMINOPHEN 500 MG
500 TABLET ORAL PRN
Status: ON HOLD | COMMUNITY
End: 2020-01-31

## 2018-08-10 RX ORDER — ONDANSETRON 2 MG/ML
INJECTION INTRAMUSCULAR; INTRAVENOUS PRN
Status: DISCONTINUED | OUTPATIENT
Start: 2018-08-10 | End: 2018-08-10 | Stop reason: SDUPTHER

## 2018-08-10 RX ORDER — DEXAMETHASONE SODIUM PHOSPHATE 10 MG/ML
INJECTION INTRAMUSCULAR; INTRAVENOUS PRN
Status: DISCONTINUED | OUTPATIENT
Start: 2018-08-10 | End: 2018-08-10 | Stop reason: SDUPTHER

## 2018-08-10 RX ADMIN — PHENYLEPHRINE HYDROCHLORIDE 100 MCG: 10 INJECTION INTRAVENOUS at 11:04

## 2018-08-10 RX ADMIN — FENTANYL CITRATE 25 MCG: 50 INJECTION INTRAMUSCULAR; INTRAVENOUS at 10:53

## 2018-08-10 RX ADMIN — DIPHENHYDRAMINE HYDROCHLORIDE 12.5 MG: 50 INJECTION, SOLUTION INTRAMUSCULAR; INTRAVENOUS at 10:45

## 2018-08-10 RX ADMIN — PROPOFOL 30 MG: 10 INJECTION, EMULSION INTRAVENOUS at 10:40

## 2018-08-10 RX ADMIN — FENTANYL CITRATE 100 MCG: 50 INJECTION, SOLUTION INTRAMUSCULAR; INTRAVENOUS at 10:24

## 2018-08-10 RX ADMIN — PROPOFOL 30 MG: 10 INJECTION, EMULSION INTRAVENOUS at 11:06

## 2018-08-10 RX ADMIN — SODIUM CHLORIDE, POTASSIUM CHLORIDE, SODIUM LACTATE AND CALCIUM CHLORIDE: 600; 310; 30; 20 INJECTION, SOLUTION INTRAVENOUS at 10:32

## 2018-08-10 RX ADMIN — Medication 2 G: at 10:46

## 2018-08-10 RX ADMIN — PROPOFOL 20 MG: 10 INJECTION, EMULSION INTRAVENOUS at 10:45

## 2018-08-10 RX ADMIN — MIDAZOLAM HYDROCHLORIDE 2 MG: 1 INJECTION INTRAMUSCULAR; INTRAVENOUS at 10:24

## 2018-08-10 RX ADMIN — Medication 125 MG: at 10:28

## 2018-08-10 RX ADMIN — PROPOFOL 75 MCG/KG/MIN: 10 INJECTION, EMULSION INTRAVENOUS at 10:45

## 2018-08-10 RX ADMIN — FENTANYL CITRATE 25 MCG: 50 INJECTION INTRAMUSCULAR; INTRAVENOUS at 10:45

## 2018-08-10 RX ADMIN — PHENYLEPHRINE HYDROCHLORIDE 100 MCG: 10 INJECTION INTRAVENOUS at 10:59

## 2018-08-10 RX ADMIN — FENTANYL CITRATE 50 MCG: 50 INJECTION INTRAMUSCULAR; INTRAVENOUS at 10:40

## 2018-08-10 RX ADMIN — ONDANSETRON 4 MG: 2 INJECTION, SOLUTION INTRAMUSCULAR; INTRAVENOUS at 10:45

## 2018-08-10 RX ADMIN — LIDOCAINE HYDROCHLORIDE 50 MG: 10 INJECTION, SOLUTION EPIDURAL; INFILTRATION; INTRACAUDAL at 10:40

## 2018-08-10 RX ADMIN — DEXAMETHASONE SODIUM PHOSPHATE 10 MG: 10 INJECTION INTRAMUSCULAR; INTRAVENOUS at 10:45

## 2018-08-10 RX ADMIN — MIDAZOLAM HYDROCHLORIDE 2 MG: 1 INJECTION, SOLUTION INTRAMUSCULAR; INTRAVENOUS at 10:24

## 2018-08-10 RX ADMIN — PHENYLEPHRINE HYDROCHLORIDE 100 MCG: 10 INJECTION INTRAVENOUS at 11:16

## 2018-08-10 ASSESSMENT — PULMONARY FUNCTION TESTS
PIF_VALUE: 0
PIF_VALUE: 1
PIF_VALUE: 0
PIF_VALUE: 1

## 2018-08-10 ASSESSMENT — PAIN SCALES - GENERAL
PAINLEVEL_OUTOF10: 0

## 2018-08-10 ASSESSMENT — PAIN DESCRIPTION - LOCATION: LOCATION: ARM

## 2018-08-10 ASSESSMENT — PAIN - FUNCTIONAL ASSESSMENT: PAIN_FUNCTIONAL_ASSESSMENT: 0-10

## 2018-08-10 NOTE — H&P
XL) 300 MG XL tablet Take 300 mg by mouth every morning  2/6/15  Yes Historical Provider, MD   fluorouracil (EFUDEX) 5 % cream  9/20/17   Historical Provider, MD   Handicap Placard MISC by Does not apply route Disability parking for 5 years 9/3/15   Antonio Urrutia MD   alendronate (FOSAMAX) 70 MG tablet Take 70 mg by mouth every 7 days  2/7/15   Historical Provider, MD   aspirin 81 MG tablet Take 81 mg by mouth daily. Historical Provider, MD     Allergies:    No known allergies  Social History:   Social History     Social History    Marital status:      Spouse name: N/A    Number of children: N/A    Years of education: N/A     Social History Main Topics    Smoking status: Former Smoker     Packs/day: 1.00     Years: 51.00     Types: Cigarettes     Quit date: 5/15/2016    Smokeless tobacco: Never Used    Alcohol use Yes      Comment: 1 time per month    Drug use: No    Sexual activity: Not on file     Other Topics Concern    Not on file     Social History Narrative    No narrative on file     Family History:  Family History   Problem Relation Age of Onset    Heart Disease Mother        REVIEW OF SYSTEMS:  Constitutional: Negative for fever and chills. HENT: Negative for congestion or drainage   Eyes: Negative for blurred vision and double vision. Respiratory: Negative for cough, shortness of breath and wheezing. Cardiovascular: Negative for chest pain and palpitations. Gastrointestinal: Negative for nausea. Negative for vomiting. Musculoskeletal: Positive for myalgias and joint pain. Skin: Negative for itching and rash. Neurological: Negative for dizziness, sensory change and headaches. Psychiatric/Behavioral: Negative for depression and suicidal ideas.        PHYSICAL EXAM:  Blood pressure 113/72, pulse 92, temperature 99.7 °F (37.6 °C), temperature source Temporal, resp. rate 14, height 5' 4\" (1.626 m), weight 170 lb (77.1 kg), SpO2 100 %, not currently breastfeeding.   Gen: alert and oriented, NAD, cooperative  Head: normocephalic atraumatic   Cardiovascular: Regular rate, no dependent edema, distal pulses 2+  Respiratory: Chest symmetric, no accessory muscle use, normal respirations  R wrist: sugar tong splint in place. Able to move all fingers. Sensation intact. Fingers warm, well perfused. A/P: 71 y.o. female  was evaluated and after discussion surgical and non surgical options, the patient has decided to undergo Right distal radius CRPP vs ORIF. Consent obtained and in chart. All questions answered appropriately. Surgical risks including but not limited to: bleeding, blood clots, infection, damage to surrounding tissues/nerves/vessels, failure of fixation, failure of wounds to heal, loss of motion, stiffness, dislocation, postoperative pain, recurrence of symptoms, need for future surgery,  risks of anesthesia, loss of limb and loss of life were all discussed with the patient. Knowing these risks, the patient wishes to proceed with surgery. -Abx OCTOR  -Site marked, Consent in chart  -AC held  -NPO since midnight  -All question answered.     Elijah Herring DO  Orthopedic Surgery Resident PGY-1  [facility]

## 2018-08-10 NOTE — ANESTHESIA PRE PROCEDURE
Department of Anesthesiology  Preprocedure Note       Name:  Gabrielle Newberry   Age:  71 y.o.  :  1945                                          MRN:  [id number]         Date:  8/10/2018      Surgeon: Armida Chacon):  Christiano West MD    Procedure: Procedure(s):  CLOSED REDUCTION PERC. PINNING RIGHT DISTAL RADIUS  (K-WIRES)    Medications prior to admission:   Prior to Admission medications    Medication Sig Start Date End Date Taking? Authorizing Provider   acetaminophen (TYLENOL) 500 MG tablet Take 500 mg by mouth as needed   Yes Historical Provider, MD   cyclobenzaprine (FLEXERIL) 10 MG tablet cyclobenzaprine 10 mg tablet   Take 1 tablet 3 times a day by oral route for 30 days. Yes Historical Provider, MD   oxyCODONE (ROXICODONE) 5 MG immediate release tablet Take 1 tablet by mouth 2 times daily as needed for Pain for up to 30 days. . 18 Yes Christiano West MD   lisinopril (PRINIVIL;ZESTRIL) 10 MG tablet TAKE 1 TABLET BY MOUTH DAILY 17  Yes Historical Provider, MD   furosemide (LASIX) 20 MG tablet Take 20 mg by mouth daily  6/17/15  Yes Historical Provider, MD   buPROPion (WELLBUTRIN XL) 300 MG XL tablet Take 300 mg by mouth every morning  2/6/15  Yes Historical Provider, MD   fluorouracil (EFUDEX) 5 % cream  17   Historical Provider, MD   Handicap Placard MISC by Does not apply route Disability parking for 5 years 9/3/15   Christiano West MD   alendronate (FOSAMAX) 70 MG tablet Take 70 mg by mouth every 7 days  2/7/15   Historical Provider, MD   aspirin 81 MG tablet Take 81 mg by mouth daily. Historical Provider, MD       Current medications:    Current Facility-Administered Medications   Medication Dose Route Frequency Provider Last Rate Last Dose    fentaNYL (SUBLIMAZE) 100 MCG/2ML injection             midazolam (VERSED) 2 MG/2ML injection                Allergies:     Allergies   Allergen Reactions    No Known Allergies        Problem List:    Patient Active Problem List   Diagnosis Code    Open fracture of left ankle S82.892B    Hyponatremia E87.1    Osteoarthrosis involving lower leg M17.10    Primary osteoarthritis of right knee M17.11    Acute meniscal tear, medial S83.249A    Primary osteoarthritis of both knees M17.0       Past Medical History:        Diagnosis Date    Arthritis     Cancer ([address]     basal cell lt arm    Chronic back pain     Depression     Eczema     Hypertension     Hyponatremia     see's nephrologist for this, on lasix for this    Iron deficiency anemia 04/28/2017    just recieved iron infusion for first time, ordered by PCP    Knee pain, right     PONV (postoperative nausea and vomiting)     Rotator cuff disorder     rt shoulder    Snores     Wears glasses        Past Surgical History:        Procedure Laterality Date    ANKLE ARTHROSCOPY Left 02/06/2015    ANKLE SURGERY Left 8/13/14    open orif    DILATION AND CURETTAGE OF UTERUS      x2    EYE SURGERY Bilateral 04/2017    cataract with lens implants    KNEE SURGERY Right 05/19/2017    total knee replacement    SKIN CANCER EXCISION Left 02/2017    2 months ago    TOTAL KNEE ARTHROPLASTY Right 5/19/2017    KNEE TOTAL ARTHROPLASTY - Devorah Lisa performed by Desiree Ibrahim MD at [address] [place] [address] History:    Social History   Substance Use Topics    Smoking status: Former Smoker     Packs/day: 1.00     Years: 51.00     Types: Cigarettes     Quit date: 5/15/2016    Smokeless tobacco: Never Used    Alcohol use Yes      Comment: 1 time per month                                Counseling given: Not Answered      Vital Signs (Current):   Vitals:    08/10/18 0937   BP: 113/72   Pulse: 92   Resp: 14   Temp: 37.6 °C (99.7 °F)   TempSrc: Temporal   SpO2: 100%   Weight: 170 lb (77.1 kg)   Height: 5' 4\" (1.626 m)                                              BP Readings from Last 3 Encounters:   08/10/18 113/72   08/08/18 (!) 162/90   05/22/17 120/65       NPO Status: Time of last liquid consumption: 0533 Beta Blocker         Neuro/Psych:   (+) depression/anxiety    (-) psychiatric history           GI/Hepatic/Renal:   (+) renal disease: CRI,           Endo/Other:    (+) : arthritis: OA., .                 Abdominal:       Abdomen: soft. Vascular: negative vascular ROS. Anesthesia Plan      MAC, general and regional     ASA 2       Induction: intravenous. MIPS: Prophylactic antiemetics administered. Anesthetic plan and risks discussed with patient. Plan discussed with attending.                   Colt Acharya   8/10/2018

## 2018-08-10 NOTE — ANESTHESIA PROCEDURE NOTES
Peripheral Block    Patient location during procedure: pre-op  Start time: 8/10/2018 10:27 AM  End time: 8/10/2018 10:32 AM  Staffing  Anesthesiologist: Haylie Poole  Performed: anesthesiologist   Preanesthetic Checklist  Completed: patient identified, site marked, surgical consent, pre-op evaluation, timeout performed, IV checked, risks and benefits discussed, monitors and equipment checked, anesthesia consent given, oxygen available and patient being monitored  Peripheral Block  Patient position: sitting  Prep: ChloraPrep  Patient monitoring: cardiac monitor, continuous pulse ox, frequent blood pressure checks and IV access  Block type: Brachial plexus  Laterality: right  Injection technique: single-shot  Procedures: ultrasound guided  Local infiltration: lidocaine  Infiltration strength: 1 %  Dose: 3 mL  Supraclavicular  Provider prep: mask and sterile gloves  Local infiltration: lidocaine  Needle  Needle gauge: 21 G  Needle length: 10 cm  Needle localization: ultrasound guidance  Assessment  Injection assessment: negative aspiration for heme, no paresthesia on injection and local visualized surrounding nerve on ultrasound  Paresthesia pain: none  Slow fractionated injection: yes  Hemodynamics: stable  Additional Notes  Immediately prior to procedure a \"time out\" was called to verify the correct patient, allergies, laterality, procedure and equipment. Time out performed with Purnima RN    Local Anesthetic: 0.25% Bupivacaine,  Amount: 25 ml  in 5 ml increments after negative aspiration each time.         Reason for block: post-op pain management and at surgeon's request

## 2018-08-12 NOTE — OP NOTE
[facility]                                 OPERATIVE REPORT    PATIENT NAME: Chaim Harris                 :        1945  MED REC NO:   [id number]                             ROOM:  ACCOUNT NO:   [de-identified]                           ADMIT DATE: 08/10/2018  [facility]    DATE OF PROCEDURE:  08/10/2018    PREOPERATIVE DIAGNOSIS:  Right distal radius fracture. POSTOPERATIVE DIAGNOSIS:  Right distal radius fracture. PROCEDURE:  Closed reduction and percutaneous pinning of right distal  radius fracture. ANESTHESIA:  Regional, general, monitored anesthesia care. SURGEON:  Christiano West MD    RESIDENTS:  Kieran Jorgensen DO; [facility]; Zoraida Das DO    ESTIMATED BLOOD LOSS:  5 mL. FLUIDS:  500 mL of crystalloid. COMPLICATIONS:  None. IMPLANTS:  Two 0.062 x 6-inch K-wires. FINDINGS:  Right distal radius fracture. INDICATIONS:  The patient is a 36-year-old female, who sustained a fall  from standing height backwards onto her wrist, trying to catch herself. The patient noted immediate pain and deformity after the fall. At follow  up appointment after being splinted in the emergency department, her wrist  was noted to have significant deformity and met operative indications for  surgical fixation of her fracture. Risks, benefits and alternatives were  discussed with the patient, which included, but were not limited to  bleeding, blood clots, infection, wound complications, residual pain, need  for further surgeries, damage to surrounding structures, anesthesia risks,  hardware failure, malunion, nonunion, hardware irritation, loss of limb or  loss of life. The patient understood these risks and wished to proceed  with surgical management of her fracture. The patient was marked and  consented in the preoperative area.     OPERATIVE PROCEDURE:  The patient was taken back to the operative suite,  transported to the operative table without complications. She underwent  MAC anesthesia and then was prepped and draped in a standard sterile  fashion to the right upper extremity. At this time, the patient was then  given a local block with approximately 40 mL of 0.5%  bupivacaine were used. A superficial radial sensory branch block and radial styloid as well as dorsal radial  hematoma block was then undertaken. After the block was obtained and  adequate anesthesia, a reduction maneuver was attempted with extension  traction and flexion of the dorsally angulated distal radius fracture. After reduction was made, it was confirmed on AP and lateral fluoroscopic  radiographs. At this time, a 0.062 K-wire was then started on the radial  styloid of the hand and was driven under power from the radial styloid to  the ulnar cortex of the radius. Confirmation of pin placement across the  fracture site was obtained on both AP and lateral radiographs. At this  time, a second radial styloid pin was then used just slightly distal to the  first proximal radial styloid pin and was angled towards the ulnar cortex  of the radius and driven under power with bicortical purchase. Attention  was then turned to the dorsal aspect of the radius and pin was then placed  at the dorsal lip of the radius, confirmed on AP and lateral radiographs  and was driven under power from dorsal to volar cortex, aiming proximally  across the fracture. After confirmation of all pin positioning on AP and  lateral radiographs, we then underwent live fluoroscopic examination under  anesthesia to assess the stability of the fracture. Fracture appeared  stable in all planes of motion. We were happy with our reduction,  regaining radial height, inclination and lower tilt of the radius. Pins  were then cut to appropriate length. Pin caps were placed. Pin sites were  dressed with Adaptic, 4 x 4's, and ABD. The patient was then wrapped with  Webril and then placed in a volar slab splint in an intrinsic plus position  and then wrapped in an Ace wrap. The patient was reversed from anesthesia  without complications and transported back to the PACU under stable  condition. Dr. Maryanne Edwards was present and active throughout the entirety of the case.         Amber Yusuf DO    D: 08/11/2018 11:04:15       T: 08/11/2018 20:55:38     [id number]  Job#: [id number]     Doc#: [id number]    CC:

## 2018-08-16 ENCOUNTER — TELEPHONE (OUTPATIENT)
Dept: ORTHOPEDIC SURGERY | Age: 73
End: 2018-08-16

## 2018-08-16 DIAGNOSIS — S52.571A CLOSED DIE-PUNCH FRACTURE OF RIGHT RADIUS, INITIAL ENCOUNTER: Primary | ICD-10-CM

## 2018-08-16 NOTE — TELEPHONE ENCOUNTER
Patient has first post op visit 08/28/18 and wants to know if should have x-rays prior.  Please advise

## 2018-08-28 ENCOUNTER — HOSPITAL ENCOUNTER (OUTPATIENT)
Dept: GENERAL RADIOLOGY | Age: 73
Discharge: HOME | End: 2018-08-30
Payer: MEDICARE

## 2018-08-28 ENCOUNTER — OFFICE VISIT (OUTPATIENT)
Dept: ORTHOPEDIC SURGERY | Age: 73
End: 2018-08-28

## 2018-08-28 ENCOUNTER — HOSPITAL ENCOUNTER (OUTPATIENT)
Age: 73
Discharge: HOME | End: 2018-08-30
Payer: MEDICARE

## 2018-08-28 VITALS — WEIGHT: 175 LBS | HEIGHT: 64 IN | BODY MASS INDEX: 29.88 KG/M2

## 2018-08-28 DIAGNOSIS — S52.571D CLOSED DIE-PUNCH FRACTURE OF RIGHT RADIUS WITH ROUTINE HEALING, SUBSEQUENT ENCOUNTER: ICD-10-CM

## 2018-08-28 DIAGNOSIS — S52.571D CLOSED DIE-PUNCH FRACTURE OF RIGHT RADIUS WITH ROUTINE HEALING, SUBSEQUENT ENCOUNTER: Primary | ICD-10-CM

## 2018-08-28 PROCEDURE — 99024 POSTOP FOLLOW-UP VISIT: CPT | Performed by: ORTHOPAEDIC SURGERY

## 2018-08-28 PROCEDURE — 73110 X-RAY EXAM OF WRIST: CPT

## 2018-09-11 ENCOUNTER — HOSPITAL ENCOUNTER (OUTPATIENT)
Dept: GENERAL RADIOLOGY | Age: 73
Discharge: HOME | End: 2018-09-13
Payer: MEDICARE

## 2018-09-11 ENCOUNTER — OFFICE VISIT (OUTPATIENT)
Dept: ORTHOPEDIC SURGERY | Age: 73
End: 2018-09-11

## 2018-09-11 ENCOUNTER — HOSPITAL ENCOUNTER (OUTPATIENT)
Age: 73
Discharge: HOME | End: 2018-09-13
Payer: MEDICARE

## 2018-09-11 VITALS — HEIGHT: 64 IN | WEIGHT: 170 LBS | BODY MASS INDEX: 29.02 KG/M2

## 2018-09-11 DIAGNOSIS — S52.571D CLOSED DIE-PUNCH FRACTURE OF RIGHT RADIUS WITH ROUTINE HEALING, SUBSEQUENT ENCOUNTER: ICD-10-CM

## 2018-09-11 DIAGNOSIS — S52.571D CLOSED DIE-PUNCH FRACTURE OF RIGHT RADIUS WITH ROUTINE HEALING, SUBSEQUENT ENCOUNTER: Primary | ICD-10-CM

## 2018-09-11 PROCEDURE — 99024 POSTOP FOLLOW-UP VISIT: CPT | Performed by: ORTHOPAEDIC SURGERY

## 2018-09-11 PROCEDURE — 73110 X-RAY EXAM OF WRIST: CPT

## 2018-09-11 NOTE — PROGRESS NOTES
This patient who had undergone close reduction and percutaneous pin fixation of right distal radial fracture on August 10, 2018 had her cast and pins removed. Clinically  and radiologically the fracture is well-healed. I have demonstrated to her mobilizing exercises and we'll see her again in 2 weeks and if not improving then we will refer her to physical therapy.

## 2018-09-25 ENCOUNTER — OFFICE VISIT (OUTPATIENT)
Dept: ORTHOPEDIC SURGERY | Age: 73
End: 2018-09-25

## 2018-09-25 VITALS — HEIGHT: 64 IN | BODY MASS INDEX: 29.02 KG/M2 | WEIGHT: 170 LBS

## 2018-09-25 DIAGNOSIS — Z96.651 HISTORY OF TOTAL RIGHT KNEE REPLACEMENT: ICD-10-CM

## 2018-09-25 DIAGNOSIS — S52.571D CLOSED DIE-PUNCH FRACTURE OF RIGHT RADIUS WITH ROUTINE HEALING, SUBSEQUENT ENCOUNTER: Primary | ICD-10-CM

## 2018-09-25 PROCEDURE — 99024 POSTOP FOLLOW-UP VISIT: CPT | Performed by: ORTHOPAEDIC SURGERY

## 2018-09-25 RX ORDER — OXYCODONE HYDROCHLORIDE 5 MG/1
5 TABLET ORAL 2 TIMES DAILY PRN
Qty: 60 TABLET | Refills: 0 | Status: SHIPPED | OUTPATIENT
Start: 2018-09-25 | End: 2018-11-29 | Stop reason: SDUPTHER

## 2018-09-25 NOTE — PROGRESS NOTES
Patient who had undergone close reduction and percutaneous pain fixation of right distal radial fracture on August 10 and had her pins removed is seen here in follow-up. Patient has continued to exercise the wrist and the fingers. However she is still having some pain over the ulnar styloid. She has excellent motion of the wrist now. She has poor  strength. I reviewed the x-rays with the patient again showing her the collapse of the radial fracture. The incongruity at the radioulnar joint. The plan is she will continue mobilizing and using it as much as possible and if we in the future she still has problems after 6 months then may shorten her ulna. See her as necessary. Prescription for Percocet was renewed.

## 2018-11-29 DIAGNOSIS — S52.571D CLOSED DIE-PUNCH FRACTURE OF RIGHT RADIUS WITH ROUTINE HEALING, SUBSEQUENT ENCOUNTER: ICD-10-CM

## 2018-11-30 RX ORDER — OXYCODONE HYDROCHLORIDE 5 MG/1
5 TABLET ORAL 2 TIMES DAILY PRN
Qty: 60 TABLET | Refills: 0 | Status: SHIPPED | OUTPATIENT
Start: 2018-11-30 | End: 2019-01-29 | Stop reason: SDUPTHER

## 2019-01-28 DIAGNOSIS — S52.571D CLOSED DIE-PUNCH FRACTURE OF RIGHT RADIUS WITH ROUTINE HEALING, SUBSEQUENT ENCOUNTER: ICD-10-CM

## 2019-01-29 ENCOUNTER — OFFICE VISIT (OUTPATIENT)
Dept: ORTHOPEDIC SURGERY | Age: 74
End: 2019-01-29
Payer: MEDICARE

## 2019-01-29 VITALS — HEIGHT: 64 IN | WEIGHT: 169.97 LBS | BODY MASS INDEX: 29.02 KG/M2

## 2019-01-29 DIAGNOSIS — S52.571D CLOSED DIE-PUNCH FRACTURE OF RIGHT RADIUS WITH ROUTINE HEALING, SUBSEQUENT ENCOUNTER: ICD-10-CM

## 2019-01-29 DIAGNOSIS — S82.892E TYPE I OR II OPEN FRACTURE OF LEFT ANKLE WITH ROUTINE HEALING, SUBSEQUENT ENCOUNTER: ICD-10-CM

## 2019-01-29 DIAGNOSIS — M18.11 PRIMARY OSTEOARTHRITIS OF FIRST CARPOMETACARPAL JOINT OF RIGHT HAND: Primary | ICD-10-CM

## 2019-01-29 DIAGNOSIS — G89.29 CHRONIC PAIN OF RIGHT THUMB: ICD-10-CM

## 2019-01-29 DIAGNOSIS — M17.11 PRIMARY OSTEOARTHRITIS OF RIGHT KNEE: ICD-10-CM

## 2019-01-29 DIAGNOSIS — M79.644 CHRONIC PAIN OF RIGHT THUMB: ICD-10-CM

## 2019-01-29 PROCEDURE — 4040F PNEUMOC VAC/ADMIN/RCVD: CPT | Performed by: ORTHOPAEDIC SURGERY

## 2019-01-29 PROCEDURE — 3017F COLORECTAL CA SCREEN DOC REV: CPT | Performed by: ORTHOPAEDIC SURGERY

## 2019-01-29 PROCEDURE — G8427 DOCREV CUR MEDS BY ELIG CLIN: HCPCS | Performed by: ORTHOPAEDIC SURGERY

## 2019-01-29 PROCEDURE — 1090F PRES/ABSN URINE INCON ASSESS: CPT | Performed by: ORTHOPAEDIC SURGERY

## 2019-01-29 PROCEDURE — 99213 OFFICE O/P EST LOW 20 MIN: CPT | Performed by: ORTHOPAEDIC SURGERY

## 2019-01-29 PROCEDURE — 1036F TOBACCO NON-USER: CPT | Performed by: ORTHOPAEDIC SURGERY

## 2019-01-29 PROCEDURE — 1123F ACP DISCUSS/DSCN MKR DOCD: CPT | Performed by: ORTHOPAEDIC SURGERY

## 2019-01-29 PROCEDURE — G8400 PT W/DXA NO RESULTS DOC: HCPCS | Performed by: ORTHOPAEDIC SURGERY

## 2019-01-29 PROCEDURE — G8417 CALC BMI ABV UP PARAM F/U: HCPCS | Performed by: ORTHOPAEDIC SURGERY

## 2019-01-29 PROCEDURE — 1101F PT FALLS ASSESS-DOCD LE1/YR: CPT | Performed by: ORTHOPAEDIC SURGERY

## 2019-01-29 PROCEDURE — 20600 DRAIN/INJ JOINT/BURSA W/O US: CPT | Performed by: ORTHOPAEDIC SURGERY

## 2019-01-29 PROCEDURE — G8484 FLU IMMUNIZE NO ADMIN: HCPCS | Performed by: ORTHOPAEDIC SURGERY

## 2019-01-29 RX ORDER — OXYCODONE HYDROCHLORIDE 5 MG/1
5 TABLET ORAL 2 TIMES DAILY PRN
Qty: 60 TABLET | Refills: 0 | Status: SHIPPED | OUTPATIENT
Start: 2019-01-29 | End: 2019-03-27 | Stop reason: SDUPTHER

## 2019-01-29 RX ORDER — METHYLPREDNISOLONE ACETATE 40 MG/ML
40 INJECTION, SUSPENSION INTRA-ARTICULAR; INTRALESIONAL; INTRAMUSCULAR; SOFT TISSUE ONCE
Status: COMPLETED | OUTPATIENT
Start: 2019-01-29 | End: 2019-01-29

## 2019-01-29 RX ADMIN — METHYLPREDNISOLONE ACETATE 40 MG: 40 INJECTION, SUSPENSION INTRA-ARTICULAR; INTRALESIONAL; INTRAMUSCULAR; SOFT TISSUE at 11:24

## 2019-03-27 DIAGNOSIS — S82.892E TYPE I OR II OPEN FRACTURE OF LEFT ANKLE WITH ROUTINE HEALING, SUBSEQUENT ENCOUNTER: ICD-10-CM

## 2019-03-27 DIAGNOSIS — M17.11 PRIMARY OSTEOARTHRITIS OF RIGHT KNEE: ICD-10-CM

## 2019-03-27 RX ORDER — OXYCODONE HYDROCHLORIDE 5 MG/1
5 TABLET ORAL 2 TIMES DAILY PRN
Qty: 60 TABLET | Refills: 0 | Status: SHIPPED | OUTPATIENT
Start: 2019-03-27 | End: 2019-05-30 | Stop reason: SDUPTHER

## 2019-05-29 DIAGNOSIS — S82.892E TYPE I OR II OPEN FRACTURE OF LEFT ANKLE WITH ROUTINE HEALING, SUBSEQUENT ENCOUNTER: ICD-10-CM

## 2019-05-29 DIAGNOSIS — M17.11 PRIMARY OSTEOARTHRITIS OF RIGHT KNEE: ICD-10-CM

## 2019-05-30 DIAGNOSIS — M17.11 PRIMARY OSTEOARTHRITIS OF RIGHT KNEE: ICD-10-CM

## 2019-05-30 DIAGNOSIS — S82.892E TYPE I OR II OPEN FRACTURE OF LEFT ANKLE WITH ROUTINE HEALING, SUBSEQUENT ENCOUNTER: ICD-10-CM

## 2019-05-30 RX ORDER — OXYCODONE HYDROCHLORIDE 5 MG/1
5 TABLET ORAL 2 TIMES DAILY PRN
Qty: 60 TABLET | Refills: 0 | Status: CANCELLED | OUTPATIENT
Start: 2019-05-30 | End: 2019-06-29

## 2019-05-30 RX ORDER — OXYCODONE HYDROCHLORIDE 5 MG/1
5 TABLET ORAL 2 TIMES DAILY PRN
Qty: 60 TABLET | Refills: 0 | Status: SHIPPED | OUTPATIENT
Start: 2019-05-30 | End: 2019-05-30 | Stop reason: SDUPTHER

## 2019-05-30 NOTE — TELEPHONE ENCOUNTER
[facility] [employer] is out of the [facility]. [facility]. A new request is pended.  Thank you

## 2019-05-31 RX ORDER — OXYCODONE HYDROCHLORIDE 5 MG/1
5 TABLET ORAL 2 TIMES DAILY PRN
Qty: 60 TABLET | Refills: 0 | Status: SHIPPED | OUTPATIENT
Start: 2019-05-31 | End: 2019-08-12 | Stop reason: SDUPTHER

## 2019-06-13 ENCOUNTER — OFFICE VISIT (OUTPATIENT)
Dept: ORTHOPEDIC SURGERY | Age: 74
End: 2019-06-13
Payer: MEDICARE

## 2019-06-13 VITALS — WEIGHT: 169.97 LBS | BODY MASS INDEX: 29.02 KG/M2 | HEIGHT: 64 IN

## 2019-06-13 DIAGNOSIS — M21.371 RIGHT FOOT DROP: ICD-10-CM

## 2019-06-13 DIAGNOSIS — M72.2 PLANTAR FASCIITIS OF RIGHT FOOT: Primary | ICD-10-CM

## 2019-06-13 DIAGNOSIS — M41.9 SCOLIOSIS OF LUMBAR SPINE, UNSPECIFIED SCOLIOSIS TYPE: ICD-10-CM

## 2019-06-13 PROCEDURE — 1090F PRES/ABSN URINE INCON ASSESS: CPT | Performed by: ORTHOPAEDIC SURGERY

## 2019-06-13 PROCEDURE — G8417 CALC BMI ABV UP PARAM F/U: HCPCS | Performed by: ORTHOPAEDIC SURGERY

## 2019-06-13 PROCEDURE — 4040F PNEUMOC VAC/ADMIN/RCVD: CPT | Performed by: ORTHOPAEDIC SURGERY

## 2019-06-13 PROCEDURE — 1123F ACP DISCUSS/DSCN MKR DOCD: CPT | Performed by: ORTHOPAEDIC SURGERY

## 2019-06-13 PROCEDURE — 99213 OFFICE O/P EST LOW 20 MIN: CPT | Performed by: ORTHOPAEDIC SURGERY

## 2019-06-13 PROCEDURE — G8400 PT W/DXA NO RESULTS DOC: HCPCS | Performed by: ORTHOPAEDIC SURGERY

## 2019-06-13 PROCEDURE — 1036F TOBACCO NON-USER: CPT | Performed by: ORTHOPAEDIC SURGERY

## 2019-06-13 PROCEDURE — 3017F COLORECTAL CA SCREEN DOC REV: CPT | Performed by: ORTHOPAEDIC SURGERY

## 2019-06-13 PROCEDURE — G8427 DOCREV CUR MEDS BY ELIG CLIN: HCPCS | Performed by: ORTHOPAEDIC SURGERY

## 2019-06-13 NOTE — PROGRESS NOTES
Chief Complaint   Patient presents with    Follow-up     discuss any options for lt ankle      This patient is seen here today for complaint of pain in the right heel area and slapping of the left foot. The right heel pain started few weeks ago but she states that it feels better than last week. The pain is located over the medial and the plantar aspect of the heel. There is no numbness or tingling. As for the left foot she says that it is turning out more than before. In the past she had been given a heparin which she said which did help her. However her sister had noticed that she was slapping her left foot. Examination: She walks with a toe gait but does slap her foot. She also has a tilted to that side and a limp. In standing position she has significant valgus deformity of the left foot. The right leg alignment appears satisfactory. In sitting position the right foot shows tenderness over the medial side of the heel with the trigger point. And in sitting position she had marked weakness of the dorsiflexors of the toes and the ankle on the left side. Her graph diagnosis: Left foot drop. #2 planovalgus deformity left foot. #3 plantar fasciitis right heel. Treatment: Patient had seen  in the past and was told that surgery would be very difficult and may not help her. The patient states that she had an MRI of the lumbar spine carried out. She thinks it was carried out at the [facility]. She had x-rays carried out at [facility].    She was also given a brace for her foot drop. Treatment: I requested that she bring the CD of the MRI and x-rays from [facility] and the brace with her and see her again after she has collecting these. Follow-up to make another appointment.

## 2019-06-20 ENCOUNTER — OFFICE VISIT (OUTPATIENT)
Dept: ORTHOPEDIC SURGERY | Age: 74
End: 2019-06-20
Payer: MEDICARE

## 2019-06-20 VITALS — HEIGHT: 64 IN | BODY MASS INDEX: 29.02 KG/M2 | WEIGHT: 169.97 LBS

## 2019-06-20 DIAGNOSIS — M66.871 TIBIALIS POSTERIOR TENDON TEAR, NONTRAUMATIC, RIGHT: Primary | ICD-10-CM

## 2019-06-20 DIAGNOSIS — M21.372 FOOT DROP, LEFT: ICD-10-CM

## 2019-06-20 PROCEDURE — 1090F PRES/ABSN URINE INCON ASSESS: CPT | Performed by: ORTHOPAEDIC SURGERY

## 2019-06-20 PROCEDURE — G8417 CALC BMI ABV UP PARAM F/U: HCPCS | Performed by: ORTHOPAEDIC SURGERY

## 2019-06-20 PROCEDURE — G8400 PT W/DXA NO RESULTS DOC: HCPCS | Performed by: ORTHOPAEDIC SURGERY

## 2019-06-20 PROCEDURE — 4040F PNEUMOC VAC/ADMIN/RCVD: CPT | Performed by: ORTHOPAEDIC SURGERY

## 2019-06-20 PROCEDURE — 1123F ACP DISCUSS/DSCN MKR DOCD: CPT | Performed by: ORTHOPAEDIC SURGERY

## 2019-06-20 PROCEDURE — 99213 OFFICE O/P EST LOW 20 MIN: CPT | Performed by: ORTHOPAEDIC SURGERY

## 2019-06-20 PROCEDURE — G8427 DOCREV CUR MEDS BY ELIG CLIN: HCPCS | Performed by: ORTHOPAEDIC SURGERY

## 2019-06-20 PROCEDURE — 3017F COLORECTAL CA SCREEN DOC REV: CPT | Performed by: ORTHOPAEDIC SURGERY

## 2019-06-20 PROCEDURE — 1036F TOBACCO NON-USER: CPT | Performed by: ORTHOPAEDIC SURGERY

## 2019-06-20 NOTE — PROGRESS NOTES
This patient is still continued to have problem with the feet. Her pain is more mainly on the right side. On the left side she has a foot drop. But she does not have a brace. X examination: Right foot examination shows she has tenderness along the course of the tibialis posterior tendon. There is no tenderness over the plantar aspect of the heel. Eversion was painful. Ankle motions are excellent. On the left side she has definite foot drop. There is a planovalgus deformity. I reviewed the x-rays of the lumbar spine from 10 and the MRI from [facility]. [facility] and she has severe double major thoracolumbar scoliosis and significant narrowing at L4-5 level on. Patient had a discussion with  and they have decided it was not worth surgical treatment. Patient has did with this foot drop for a long time it is asymptomatic except for her gait. Diagnosis: Right tibialis posterior tendon rupture. #2 left foot drop. Treatment: We will obtain an MRI of the right foot and see her after that. Not keen on getting a brace for the shoe but we have treated her with the heparin wedges to help with the deformity.

## 2019-07-02 ENCOUNTER — OFFICE VISIT (OUTPATIENT)
Dept: ORTHOPEDIC SURGERY | Age: 74
End: 2019-07-02
Payer: MEDICARE

## 2019-07-02 VITALS — HEIGHT: 64 IN | BODY MASS INDEX: 29.02 KG/M2 | WEIGHT: 169.97 LBS

## 2019-07-02 DIAGNOSIS — M21.372 FOOT DROP, LEFT: Primary | ICD-10-CM

## 2019-07-02 PROCEDURE — 3017F COLORECTAL CA SCREEN DOC REV: CPT | Performed by: ORTHOPAEDIC SURGERY

## 2019-07-02 PROCEDURE — 1123F ACP DISCUSS/DSCN MKR DOCD: CPT | Performed by: ORTHOPAEDIC SURGERY

## 2019-07-02 PROCEDURE — G8417 CALC BMI ABV UP PARAM F/U: HCPCS | Performed by: ORTHOPAEDIC SURGERY

## 2019-07-02 PROCEDURE — 99212 OFFICE O/P EST SF 10 MIN: CPT | Performed by: ORTHOPAEDIC SURGERY

## 2019-07-02 PROCEDURE — 1090F PRES/ABSN URINE INCON ASSESS: CPT | Performed by: ORTHOPAEDIC SURGERY

## 2019-07-02 PROCEDURE — 4040F PNEUMOC VAC/ADMIN/RCVD: CPT | Performed by: ORTHOPAEDIC SURGERY

## 2019-07-02 PROCEDURE — G8400 PT W/DXA NO RESULTS DOC: HCPCS | Performed by: ORTHOPAEDIC SURGERY

## 2019-07-02 PROCEDURE — 1036F TOBACCO NON-USER: CPT | Performed by: ORTHOPAEDIC SURGERY

## 2019-07-02 PROCEDURE — G8427 DOCREV CUR MEDS BY ELIG CLIN: HCPCS | Performed by: ORTHOPAEDIC SURGERY

## 2019-07-02 RX ORDER — LISINOPRIL 5 MG/1
2.5 TABLET ORAL DAILY
COMMUNITY

## 2019-08-12 DIAGNOSIS — M17.11 PRIMARY OSTEOARTHRITIS OF RIGHT KNEE: ICD-10-CM

## 2019-08-12 DIAGNOSIS — S82.892E TYPE I OR II OPEN FRACTURE OF LEFT ANKLE WITH ROUTINE HEALING, SUBSEQUENT ENCOUNTER: ICD-10-CM

## 2019-08-12 RX ORDER — OXYCODONE HYDROCHLORIDE 5 MG/1
5 TABLET ORAL 2 TIMES DAILY PRN
Qty: 14 TABLET | Refills: 0 | Status: SHIPPED | OUTPATIENT
Start: 2019-08-12 | End: 2019-09-16

## 2019-09-16 DIAGNOSIS — M17.11 PRIMARY OSTEOARTHRITIS OF RIGHT KNEE: ICD-10-CM

## 2019-09-16 DIAGNOSIS — S82.892E TYPE I OR II OPEN FRACTURE OF LEFT ANKLE WITH ROUTINE HEALING, SUBSEQUENT ENCOUNTER: ICD-10-CM

## 2019-09-16 RX ORDER — OXYCODONE HYDROCHLORIDE 5 MG/1
5 TABLET ORAL 2 TIMES DAILY PRN
Qty: 28 TABLET | Refills: 0 | Status: CANCELLED | OUTPATIENT
Start: 2019-09-16 | End: 2019-09-30

## 2019-09-16 RX ORDER — OXYCODONE HYDROCHLORIDE 5 MG/1
5 TABLET ORAL 2 TIMES DAILY PRN
Qty: 14 TABLET | Refills: 0 | Status: SHIPPED | OUTPATIENT
Start: 2019-09-16 | End: 2019-09-30

## 2019-12-02 DIAGNOSIS — M25.511 RIGHT SHOULDER PAIN, UNSPECIFIED CHRONICITY: Primary | ICD-10-CM

## 2019-12-03 ENCOUNTER — OFFICE VISIT (OUTPATIENT)
Dept: ORTHOPEDIC SURGERY | Age: 74
End: 2019-12-03
Payer: MEDICARE

## 2019-12-03 VITALS — HEIGHT: 64 IN | BODY MASS INDEX: 29.88 KG/M2 | WEIGHT: 175 LBS

## 2019-12-03 DIAGNOSIS — M12.811 ROTATOR CUFF ARTHROPATHY OF RIGHT SHOULDER: Primary | ICD-10-CM

## 2019-12-03 PROCEDURE — G8400 PT W/DXA NO RESULTS DOC: HCPCS | Performed by: ORTHOPAEDIC SURGERY

## 2019-12-03 PROCEDURE — 1090F PRES/ABSN URINE INCON ASSESS: CPT | Performed by: ORTHOPAEDIC SURGERY

## 2019-12-03 PROCEDURE — 1123F ACP DISCUSS/DSCN MKR DOCD: CPT | Performed by: ORTHOPAEDIC SURGERY

## 2019-12-03 PROCEDURE — G8484 FLU IMMUNIZE NO ADMIN: HCPCS | Performed by: ORTHOPAEDIC SURGERY

## 2019-12-03 PROCEDURE — G8417 CALC BMI ABV UP PARAM F/U: HCPCS | Performed by: ORTHOPAEDIC SURGERY

## 2019-12-03 PROCEDURE — 20610 DRAIN/INJ JOINT/BURSA W/O US: CPT | Performed by: ORTHOPAEDIC SURGERY

## 2019-12-03 PROCEDURE — 3017F COLORECTAL CA SCREEN DOC REV: CPT | Performed by: ORTHOPAEDIC SURGERY

## 2019-12-03 PROCEDURE — G8427 DOCREV CUR MEDS BY ELIG CLIN: HCPCS | Performed by: ORTHOPAEDIC SURGERY

## 2019-12-03 PROCEDURE — 99213 OFFICE O/P EST LOW 20 MIN: CPT | Performed by: ORTHOPAEDIC SURGERY

## 2019-12-03 PROCEDURE — 4040F PNEUMOC VAC/ADMIN/RCVD: CPT | Performed by: ORTHOPAEDIC SURGERY

## 2019-12-03 PROCEDURE — 1036F TOBACCO NON-USER: CPT | Performed by: ORTHOPAEDIC SURGERY

## 2019-12-03 RX ORDER — LIDOCAINE HYDROCHLORIDE 10 MG/ML
5 INJECTION, SOLUTION INFILTRATION; PERINEURAL ONCE
Status: COMPLETED | OUTPATIENT
Start: 2019-12-03 | End: 2019-12-03

## 2019-12-03 RX ORDER — METHYLPREDNISOLONE ACETATE 40 MG/ML
40 INJECTION, SUSPENSION INTRA-ARTICULAR; INTRALESIONAL; INTRAMUSCULAR; SOFT TISSUE ONCE
Status: COMPLETED | OUTPATIENT
Start: 2019-12-03 | End: 2019-12-03

## 2019-12-03 RX ADMIN — METHYLPREDNISOLONE ACETATE 40 MG: 40 INJECTION, SUSPENSION INTRA-ARTICULAR; INTRALESIONAL; INTRAMUSCULAR; SOFT TISSUE at 15:58

## 2019-12-03 RX ADMIN — LIDOCAINE HYDROCHLORIDE 5 ML: 10 INJECTION, SOLUTION INFILTRATION; PERINEURAL at 15:58

## 2019-12-12 ENCOUNTER — OFFICE VISIT (OUTPATIENT)
Dept: ORTHOPEDIC SURGERY | Age: 74
End: 2019-12-12
Payer: MEDICARE

## 2019-12-12 VITALS — WEIGHT: 178 LBS | HEIGHT: 64 IN | BODY MASS INDEX: 30.39 KG/M2

## 2019-12-12 DIAGNOSIS — M75.121 NONTRAUMATIC COMPLETE TEAR OF RIGHT ROTATOR CUFF: Primary | ICD-10-CM

## 2019-12-12 PROCEDURE — 99213 OFFICE O/P EST LOW 20 MIN: CPT | Performed by: ORTHOPAEDIC SURGERY

## 2019-12-12 PROCEDURE — 3017F COLORECTAL CA SCREEN DOC REV: CPT | Performed by: ORTHOPAEDIC SURGERY

## 2019-12-12 PROCEDURE — 1123F ACP DISCUSS/DSCN MKR DOCD: CPT | Performed by: ORTHOPAEDIC SURGERY

## 2019-12-12 PROCEDURE — G8427 DOCREV CUR MEDS BY ELIG CLIN: HCPCS | Performed by: ORTHOPAEDIC SURGERY

## 2019-12-12 PROCEDURE — G8417 CALC BMI ABV UP PARAM F/U: HCPCS | Performed by: ORTHOPAEDIC SURGERY

## 2019-12-12 PROCEDURE — G8484 FLU IMMUNIZE NO ADMIN: HCPCS | Performed by: ORTHOPAEDIC SURGERY

## 2019-12-12 PROCEDURE — 1036F TOBACCO NON-USER: CPT | Performed by: ORTHOPAEDIC SURGERY

## 2019-12-12 PROCEDURE — 4040F PNEUMOC VAC/ADMIN/RCVD: CPT | Performed by: ORTHOPAEDIC SURGERY

## 2019-12-12 PROCEDURE — 1090F PRES/ABSN URINE INCON ASSESS: CPT | Performed by: ORTHOPAEDIC SURGERY

## 2019-12-12 PROCEDURE — G8400 PT W/DXA NO RESULTS DOC: HCPCS | Performed by: ORTHOPAEDIC SURGERY

## 2019-12-12 RX ORDER — OXYCODONE HYDROCHLORIDE 5 MG/1
5 TABLET ORAL EVERY 8 HOURS PRN
Qty: 21 TABLET | Refills: 0 | Status: SHIPPED | OUTPATIENT
Start: 2019-12-12 | End: 2019-12-19

## 2020-01-07 ENCOUNTER — OFFICE VISIT (OUTPATIENT)
Dept: ORTHOPEDIC SURGERY | Age: 75
End: 2020-01-07
Payer: MEDICARE

## 2020-01-07 VITALS — HEIGHT: 64 IN | WEIGHT: 178 LBS | BODY MASS INDEX: 30.39 KG/M2

## 2020-01-07 PROCEDURE — 3017F COLORECTAL CA SCREEN DOC REV: CPT | Performed by: ORTHOPAEDIC SURGERY

## 2020-01-07 PROCEDURE — G8400 PT W/DXA NO RESULTS DOC: HCPCS | Performed by: ORTHOPAEDIC SURGERY

## 2020-01-07 PROCEDURE — 4040F PNEUMOC VAC/ADMIN/RCVD: CPT | Performed by: ORTHOPAEDIC SURGERY

## 2020-01-07 PROCEDURE — 1123F ACP DISCUSS/DSCN MKR DOCD: CPT | Performed by: ORTHOPAEDIC SURGERY

## 2020-01-07 PROCEDURE — G8427 DOCREV CUR MEDS BY ELIG CLIN: HCPCS | Performed by: ORTHOPAEDIC SURGERY

## 2020-01-07 PROCEDURE — G8417 CALC BMI ABV UP PARAM F/U: HCPCS | Performed by: ORTHOPAEDIC SURGERY

## 2020-01-07 PROCEDURE — 1036F TOBACCO NON-USER: CPT | Performed by: ORTHOPAEDIC SURGERY

## 2020-01-07 PROCEDURE — 1090F PRES/ABSN URINE INCON ASSESS: CPT | Performed by: ORTHOPAEDIC SURGERY

## 2020-01-07 PROCEDURE — G8484 FLU IMMUNIZE NO ADMIN: HCPCS | Performed by: ORTHOPAEDIC SURGERY

## 2020-01-07 PROCEDURE — 99213 OFFICE O/P EST LOW 20 MIN: CPT | Performed by: ORTHOPAEDIC SURGERY

## 2020-01-10 ENCOUNTER — TELEPHONE (OUTPATIENT)
Dept: ORTHOPEDIC SURGERY | Age: 75
End: 2020-01-10

## 2020-01-13 ENCOUNTER — TELEPHONE (OUTPATIENT)
Dept: ORTHOPEDIC SURGERY | Age: 75
End: 2020-01-13

## 2020-01-13 NOTE — TELEPHONE ENCOUNTER
No there is no option but when she goes to see the anaesthesiologist she should dis cuss this with him

## 2020-01-17 ENCOUNTER — HOSPITAL ENCOUNTER (OUTPATIENT)
Dept: GENERAL RADIOLOGY | Age: 75
Discharge: HOME | End: 2020-01-19
Payer: MEDICARE

## 2020-01-17 ENCOUNTER — HOSPITAL ENCOUNTER (OUTPATIENT)
Dept: PREADMISSION TESTING | Age: 75
Discharge: HOME | End: 2020-01-21
Payer: MEDICARE

## 2020-01-17 VITALS
OXYGEN SATURATION: 97 % | HEIGHT: 64 IN | RESPIRATION RATE: 16 BRPM | HEART RATE: 98 BPM | TEMPERATURE: 97.3 F | DIASTOLIC BLOOD PRESSURE: 71 MMHG | WEIGHT: 173.28 LBS | BODY MASS INDEX: 29.58 KG/M2 | SYSTOLIC BLOOD PRESSURE: 103 MMHG

## 2020-01-17 LAB
-: NORMAL
ALBUMIN SERPL-MCNC: 4.3 G/DL (ref 3.5–5.2)
ALBUMIN/GLOBULIN RATIO: 1.2 (ref 1–2.5)
ALP BLD-CCNC: 103 U/L (ref 35–104)
ALT SERPL-CCNC: 11 U/L (ref 5–33)
AMORPHOUS: NORMAL
ANION GAP SERPL CALCULATED.3IONS-SCNC: 14 MMOL/L (ref 9–17)
AST SERPL-CCNC: 18 U/L
BACTERIA: NORMAL
BILIRUB SERPL-MCNC: 0.31 MG/DL (ref 0.3–1.2)
BILIRUBIN URINE: NEGATIVE
BUN BLDV-MCNC: 11 MG/DL (ref 8–23)
BUN/CREAT BLD: NORMAL (ref 9–20)
CALCIUM SERPL-MCNC: 10.1 MG/DL (ref 8.6–10.4)
CASTS UA: NORMAL /LPF (ref 0–8)
CHLORIDE BLD-SCNC: 103 MMOL/L (ref 98–107)
CO2: 24 MMOL/L (ref 20–31)
COLOR: YELLOW
COMMENT UA: ABNORMAL
CREAT SERPL-MCNC: 0.84 MG/DL (ref 0.5–0.9)
CRYSTALS, UA: NORMAL /HPF
EPITHELIAL CELLS UA: NORMAL /HPF (ref 0–5)
GFR AFRICAN AMERICAN: >60 ML/MIN
GFR NON-AFRICAN AMERICAN: >60 ML/MIN
GFR SERPL CREATININE-BSD FRML MDRD: NORMAL ML/MIN/{1.73_M2}
GFR SERPL CREATININE-BSD FRML MDRD: NORMAL ML/MIN/{1.73_M2}
GLUCOSE BLD-MCNC: 99 MG/DL (ref 70–99)
GLUCOSE URINE: NEGATIVE
HCT VFR BLD CALC: 38.1 % (ref 36.3–47.1)
HEMOGLOBIN: 11.8 G/DL (ref 11.9–15.1)
KETONES, URINE: NEGATIVE
LEUKOCYTE ESTERASE, URINE: ABNORMAL
MCH RBC QN AUTO: 30.3 PG (ref 25.2–33.5)
MCHC RBC AUTO-ENTMCNC: 31 G/DL (ref 28.4–34.8)
MCV RBC AUTO: 97.9 FL (ref 82.6–102.9)
MRSA, DNA, NASAL: NORMAL
MUCUS: NORMAL
NITRITE, URINE: NEGATIVE
NRBC AUTOMATED: 0 PER 100 WBC
OTHER OBSERVATIONS UA: NORMAL
PDW BLD-RTO: 15.3 % (ref 11.8–14.4)
PH UA: 6 (ref 5–8)
PLATELET # BLD: 329 K/UL (ref 138–453)
PMV BLD AUTO: 10.7 FL (ref 8.1–13.5)
POTASSIUM SERPL-SCNC: 3.8 MMOL/L (ref 3.7–5.3)
PROTEIN UA: NEGATIVE
RBC # BLD: 3.89 M/UL (ref 3.95–5.11)
RBC UA: NORMAL /HPF (ref 0–4)
RENAL EPITHELIAL, UA: NORMAL /HPF
SODIUM BLD-SCNC: 141 MMOL/L (ref 135–144)
SPECIFIC GRAVITY UA: 1.01 (ref 1–1.03)
SPECIMEN DESCRIPTION: NORMAL
TOTAL PROTEIN: 7.8 G/DL (ref 6.4–8.3)
TRICHOMONAS: NORMAL
TURBIDITY: CLEAR
URINE HGB: NEGATIVE
UROBILINOGEN, URINE: NORMAL
WBC # BLD: 8.3 K/UL (ref 3.5–11.3)
WBC UA: NORMAL /HPF (ref 0–5)
YEAST: NORMAL

## 2020-01-17 PROCEDURE — 36415 COLL VENOUS BLD VENIPUNCTURE: CPT

## 2020-01-17 PROCEDURE — 81001 URINALYSIS AUTO W/SCOPE: CPT

## 2020-01-17 PROCEDURE — 93005 ELECTROCARDIOGRAM TRACING: CPT

## 2020-01-17 PROCEDURE — 87641 MR-STAPH DNA AMP PROBE: CPT

## 2020-01-17 PROCEDURE — 80053 COMPREHEN METABOLIC PANEL: CPT

## 2020-01-17 PROCEDURE — 71046 X-RAY EXAM CHEST 2 VIEWS: CPT

## 2020-01-17 PROCEDURE — 85027 COMPLETE CBC AUTOMATED: CPT

## 2020-01-17 RX ORDER — SODIUM CHLORIDE 9 MG/ML
INJECTION, SOLUTION INTRAVENOUS CONTINUOUS
Status: CANCELLED | OUTPATIENT
Start: 2020-01-17

## 2020-01-17 RX ORDER — SODIUM CHLORIDE, SODIUM LACTATE, POTASSIUM CHLORIDE, CALCIUM CHLORIDE 600; 310; 30; 20 MG/100ML; MG/100ML; MG/100ML; MG/100ML
1000 INJECTION, SOLUTION INTRAVENOUS CONTINUOUS
Status: CANCELLED | OUTPATIENT
Start: 2020-01-17

## 2020-01-17 ASSESSMENT — PAIN DESCRIPTION - LOCATION: LOCATION: SHOULDER

## 2020-01-17 ASSESSMENT — PAIN DESCRIPTION - ORIENTATION: ORIENTATION: RIGHT

## 2020-01-17 ASSESSMENT — PAIN SCALES - GENERAL: PAINLEVEL_OUTOF10: 1

## 2020-01-17 NOTE — H&P
daughter, three sons and four grandchildren  Occupation: retired realtor    Review of Systems  CONSTITUTIONAL:  negative   EYES:  glasses  HENT:  negative   RESPIRATORY:  negative   CARDIOVASCULAR:  negative   GASTROINTESTINAL:  negative   GENITOURINARY:  negative   INTEGUMENT/BREAST:  dryness  HEMATOLOGIC/LYMPHATIC:  negative   ALLERGIC/IMMUNOLOGIC:  negative   ENDOCRINE:  negative   MUSCULOSKELETAL:  myalgias, arthralgias and stiff joints  NEUROLOGICAL:  negative   BEHAVIOR/PSYCH:  negative     OBJECTIVE:     VITALS:  height is 5' 4\" (1.626 m) and weight is 173 lb 4.5 oz (78.6 kg). Her temporal temperature is 97.3 °F (36.3 °C). Her blood pressure is 103/71 and her pulse is 98. Her respiration is 16 and oxygen saturation is 97%. CONSTITUTIONAL: Alert & oriented x 3, no acute distress. SKIN:  Warm and dry, no rash or erythema. HEAD:  Normocephalic, atraumatic. EYES: PERRLA. EOMs intact. Wears glasses. EARS:  Hearing grossly normal.    NOSE:  Nares patent. Septum midline. No rhinorrhea   MOUTH/THROAT:  Unremarkable   NECK: Supple with no lymphadenopathy. LUNGS: Clear to auscultation throughout. No wheezes, rales or rhonchi. CARDIOVASCULAR: Heart rate regular. Rhythm without murmur, click, gallop or rub. ABDOMEN: Soft, non tender, non distended, no masses or organomegaly. EXTREMITIES: No clubbing, cyanosis or edema. TESTING:     EKG: NSR 1/17/2020  Labs pending: drawn 1/17/2020   Chest XRay: 1/17/2020    IMPRESSION:   1. Rotator cuff arthropathy right shoulder  2.  has a past medical history of Arthritis, Basal cell carcinoma (BCC) of left forearm, Chronic back pain, Depression, Eczema, Hypertension, Hyponatremia, Iron deficiency anemia (04/28/2017), Knee pain, right, PONV (postoperative nausea and vomiting), Rotator cuff disorder, Snores, and Wears glasses. PLAN:   1.  Reverse total arthroplasty right shoulder    Dinesh ROSENTHAL, AE-C  Electronically signed 1/17/2020 at 11:29 AM

## 2020-01-17 NOTE — PROGRESS NOTES
Anesthesia Focused Assessment    STOP-BANG Sleep Apnea Questionnaire    SNORE loudly (heard through closed doors)? Yes  TIRED, fatigued, sleepy during daytime? No  OBSERVED stopping breathing during sleep? No  High blood PRESSURE being treated? Yes    BMI over 35? No  AGE over 48? Yes  NECK circumference over 16\"? No  GENDER (male)? No             Total 3  High risk 5-8  Intermediate risk 3-4  Low risk 0-2    Obstructive Sleep Apnea: no  If YES, machine used: no     Type 1 DM:   no  T2DM:  no    Coronary Artery Disease:  no  Hypertension:  yes    Active smoker:  1/4 ppd since age 26  Drinks Alcohol:  2-4 drinks per month    Dentition: WDL    Defib / AICD / Pacemaker: no      Renal Failure/dialysis:  no    Patient was evaluated in PAT & anesthesia guidelines were applied. NPO guidelines, medication instructions and scheduled arrival time were reviewed with patient.     Hx of anesthesia complications:  PONV  Family hx of anesthesia complications:  no                                                                                                                     Anesthesia contacted:   no  Medical or cardiac clearance ordered: RAJIV Mckeon  1/17/20  11:29 AM

## 2020-01-18 LAB
EKG ATRIAL RATE: 74 BPM
EKG P AXIS: 36 DEGREES
EKG P-R INTERVAL: 156 MS
EKG Q-T INTERVAL: 378 MS
EKG QRS DURATION: 72 MS
EKG QTC CALCULATION (BAZETT): 419 MS
EKG R AXIS: 34 DEGREES
EKG T AXIS: 60 DEGREES
EKG VENTRICULAR RATE: 74 BPM

## 2020-01-18 PROCEDURE — 93010 ELECTROCARDIOGRAM REPORT: CPT | Performed by: INTERNAL MEDICINE

## 2020-01-31 ENCOUNTER — APPOINTMENT (OUTPATIENT)
Dept: GENERAL RADIOLOGY | Age: 75
DRG: 483 | End: 2020-01-31
Attending: ORTHOPAEDIC SURGERY
Payer: MEDICARE

## 2020-01-31 ENCOUNTER — HOSPITAL ENCOUNTER (INPATIENT)
Age: 75
LOS: 1 days | Discharge: HOME HEALTH | DRG: 483 | End: 2020-02-01
Attending: ORTHOPAEDIC SURGERY | Admitting: ORTHOPAEDIC SURGERY
Payer: MEDICARE

## 2020-01-31 ENCOUNTER — ANESTHESIA EVENT (OUTPATIENT)
Dept: OPERATING ROOM | Age: 75
DRG: 483 | End: 2020-01-31
Payer: MEDICARE

## 2020-01-31 ENCOUNTER — ANESTHESIA (OUTPATIENT)
Dept: OPERATING ROOM | Age: 75
DRG: 483 | End: 2020-01-31
Payer: MEDICARE

## 2020-01-31 VITALS — DIASTOLIC BLOOD PRESSURE: 82 MMHG | TEMPERATURE: 94.7 F | OXYGEN SATURATION: 100 % | SYSTOLIC BLOOD PRESSURE: 112 MMHG

## 2020-01-31 PROBLEM — Z96.611 S/P REVERSE TOTAL SHOULDER ARTHROPLASTY, RIGHT: Status: ACTIVE | Noted: 2020-01-31

## 2020-01-31 LAB — POC POTASSIUM: 4.5 MMOL/L (ref 3.5–4.5)

## 2020-01-31 PROCEDURE — 64416 NJX AA&/STRD BRCH PL NFS IMG: CPT | Performed by: ANESTHESIOLOGY

## 2020-01-31 PROCEDURE — 6370000000 HC RX 637 (ALT 250 FOR IP): Performed by: STUDENT IN AN ORGANIZED HEALTH CARE EDUCATION/TRAINING PROGRAM

## 2020-01-31 PROCEDURE — 3600000014 HC SURGERY LEVEL 4 ADDTL 15MIN: Performed by: ORTHOPAEDIC SURGERY

## 2020-01-31 PROCEDURE — 2580000003 HC RX 258: Performed by: ANESTHESIOLOGY

## 2020-01-31 PROCEDURE — 3700000001 HC ADD 15 MINUTES (ANESTHESIA): Performed by: ORTHOPAEDIC SURGERY

## 2020-01-31 PROCEDURE — 2709999900 HC NON-CHARGEABLE SUPPLY: Performed by: ORTHOPAEDIC SURGERY

## 2020-01-31 PROCEDURE — 1200000000 HC SEMI PRIVATE

## 2020-01-31 PROCEDURE — 0RRJ00Z REPLACEMENT OF RIGHT SHOULDER JOINT WITH REVERSE BALL AND SOCKET SYNTHETIC SUBSTITUTE, OPEN APPROACH: ICD-10-PCS | Performed by: ORTHOPAEDIC SURGERY

## 2020-01-31 PROCEDURE — 6360000002 HC RX W HCPCS: Performed by: STUDENT IN AN ORGANIZED HEALTH CARE EDUCATION/TRAINING PROGRAM

## 2020-01-31 PROCEDURE — 2500000003 HC RX 250 WO HCPCS: Performed by: ANESTHESIOLOGY

## 2020-01-31 PROCEDURE — 6360000002 HC RX W HCPCS

## 2020-01-31 PROCEDURE — 2500000003 HC RX 250 WO HCPCS: Performed by: NURSE ANESTHETIST, CERTIFIED REGISTERED

## 2020-01-31 PROCEDURE — 73030 X-RAY EXAM OF SHOULDER: CPT

## 2020-01-31 PROCEDURE — 6360000002 HC RX W HCPCS: Performed by: NURSE ANESTHETIST, CERTIFIED REGISTERED

## 2020-01-31 PROCEDURE — 2580000003 HC RX 258: Performed by: STUDENT IN AN ORGANIZED HEALTH CARE EDUCATION/TRAINING PROGRAM

## 2020-01-31 PROCEDURE — 3E0T3BZ INTRODUCTION OF ANESTHETIC AGENT INTO PERIPHERAL NERVES AND PLEXI, PERCUTANEOUS APPROACH: ICD-10-PCS | Performed by: ANESTHESIOLOGY

## 2020-01-31 PROCEDURE — 7100000000 HC PACU RECOVERY - FIRST 15 MIN: Performed by: ORTHOPAEDIC SURGERY

## 2020-01-31 PROCEDURE — 3700000000 HC ANESTHESIA ATTENDED CARE: Performed by: ORTHOPAEDIC SURGERY

## 2020-01-31 PROCEDURE — 2720000010 HC SURG SUPPLY STERILE: Performed by: ORTHOPAEDIC SURGERY

## 2020-01-31 PROCEDURE — 2580000003 HC RX 258: Performed by: ORTHOPAEDIC SURGERY

## 2020-01-31 PROCEDURE — C1776 JOINT DEVICE (IMPLANTABLE): HCPCS | Performed by: ORTHOPAEDIC SURGERY

## 2020-01-31 PROCEDURE — 7100000001 HC PACU RECOVERY - ADDTL 15 MIN: Performed by: ORTHOPAEDIC SURGERY

## 2020-01-31 PROCEDURE — 84132 ASSAY OF SERUM POTASSIUM: CPT

## 2020-01-31 PROCEDURE — 2500000003 HC RX 250 WO HCPCS: Performed by: STUDENT IN AN ORGANIZED HEALTH CARE EDUCATION/TRAINING PROGRAM

## 2020-01-31 PROCEDURE — 3600000004 HC SURGERY LEVEL 4 BASE: Performed by: ORTHOPAEDIC SURGERY

## 2020-01-31 PROCEDURE — 2580000003 HC RX 258: Performed by: NURSE ANESTHETIST, CERTIFIED REGISTERED

## 2020-01-31 DEVICE — SCREW BNE L35MM DIA6.5MM HEX HD DIA3.5MM FOR COMPHSVE CONV: Type: IMPLANTABLE DEVICE | Site: SHOULDER | Status: FUNCTIONAL

## 2020-01-31 DEVICE — SCREW BNE L25MM DIA4.75MM HD DIA3.5MM CORT VAR ANG: Type: IMPLANTABLE DEVICE | Site: SHOULDER | Status: FUNCTIONAL

## 2020-01-31 DEVICE — Z DUP USE 2418441 HMRL BEARING 36MM STD PRLNG: Type: IMPLANTABLE DEVICE | Site: SHOULDER | Status: FUNCTIONAL

## 2020-01-31 DEVICE — SCREW BNE L20MM DIA4.75MM HD DIA3.5MM CORT FIX ANG: Type: IMPLANTABLE DEVICE | Site: SHOULDER | Status: FUNCTIONAL

## 2020-01-31 DEVICE — SPHERE GLEN DIA36MM REG STD CO CHROM TAPR FIT FOR COMPHSVE: Type: IMPLANTABLE DEVICE | Site: SHOULDER | Status: FUNCTIONAL

## 2020-01-31 DEVICE — IMPLANTABLE DEVICE: Type: IMPLANTABLE DEVICE | Site: SHOULDER | Status: FUNCTIONAL

## 2020-01-31 DEVICE — HMRL TRAY STD +5: Type: IMPLANTABLE DEVICE | Site: SHOULDER | Status: FUNCTIONAL

## 2020-01-31 DEVICE — STEM HUM 11MM MINI SHLDR CO CHROM COMPHSVE REV PRI CEM: Type: IMPLANTABLE DEVICE | Site: SHOULDER | Status: FUNCTIONAL

## 2020-01-31 DEVICE — COMPONENT SHLDR SHT GLEN ST REVERSED 1ST XLPE COMPHSVE: Type: IMPLANTABLE DEVICE | Site: SHOULDER | Status: FUNCTIONAL

## 2020-01-31 DEVICE — SCREW BNE L15MM DIA4.75MM HD DIA3.5MM CORT FIX ANG: Type: IMPLANTABLE DEVICE | Site: SHOULDER | Status: FUNCTIONAL

## 2020-01-31 RX ORDER — LISINOPRIL 2.5 MG/1
2.5 TABLET ORAL DAILY
Status: DISCONTINUED | OUTPATIENT
Start: 2020-01-31 | End: 2020-02-01 | Stop reason: HOSPADM

## 2020-01-31 RX ORDER — GABAPENTIN 600 MG/1
300 TABLET ORAL NIGHTLY
Status: DISCONTINUED | OUTPATIENT
Start: 2020-01-31 | End: 2020-02-01 | Stop reason: HOSPADM

## 2020-01-31 RX ORDER — FUROSEMIDE 20 MG/1
20 TABLET ORAL DAILY
Status: DISCONTINUED | OUTPATIENT
Start: 2020-01-31 | End: 2020-02-01 | Stop reason: HOSPADM

## 2020-01-31 RX ORDER — SCOLOPAMINE TRANSDERMAL SYSTEM 1 MG/1
1 PATCH, EXTENDED RELEASE TRANSDERMAL ONCE
Status: DISCONTINUED | OUTPATIENT
Start: 2020-01-31 | End: 2020-01-31

## 2020-01-31 RX ORDER — SODIUM CHLORIDE, SODIUM LACTATE, POTASSIUM CHLORIDE, CALCIUM CHLORIDE 600; 310; 30; 20 MG/100ML; MG/100ML; MG/100ML; MG/100ML
1000 INJECTION, SOLUTION INTRAVENOUS CONTINUOUS
Status: DISCONTINUED | OUTPATIENT
Start: 2020-01-31 | End: 2020-01-31

## 2020-01-31 RX ORDER — OXYCODONE HYDROCHLORIDE AND ACETAMINOPHEN 5; 325 MG/1; MG/1
1 TABLET ORAL
Status: ON HOLD | COMMUNITY
End: 2020-02-01 | Stop reason: HOSPADM

## 2020-01-31 RX ORDER — MAGNESIUM HYDROXIDE 1200 MG/15ML
LIQUID ORAL CONTINUOUS PRN
Status: COMPLETED | OUTPATIENT
Start: 2020-01-31 | End: 2020-01-31

## 2020-01-31 RX ORDER — SODIUM CHLORIDE 9 MG/ML
INJECTION, SOLUTION INTRAVENOUS CONTINUOUS
Status: DISCONTINUED | OUTPATIENT
Start: 2020-01-31 | End: 2020-01-31

## 2020-01-31 RX ORDER — BUPIVACAINE HYDROCHLORIDE 5 MG/ML
40 INJECTION, SOLUTION EPIDURAL; INTRACAUDAL ONCE
Status: COMPLETED | OUTPATIENT
Start: 2020-01-31 | End: 2020-01-31

## 2020-01-31 RX ORDER — CEFAZOLIN SODIUM 1 G/3ML
INJECTION, POWDER, FOR SOLUTION INTRAMUSCULAR; INTRAVENOUS PRN
Status: DISCONTINUED | OUTPATIENT
Start: 2020-01-31 | End: 2020-01-31 | Stop reason: SDUPTHER

## 2020-01-31 RX ORDER — GLYCOPYRROLATE 1 MG/5 ML
SYRINGE (ML) INTRAVENOUS PRN
Status: DISCONTINUED | OUTPATIENT
Start: 2020-01-31 | End: 2020-01-31 | Stop reason: SDUPTHER

## 2020-01-31 RX ORDER — FENTANYL CITRATE 50 UG/ML
50 INJECTION, SOLUTION INTRAMUSCULAR; INTRAVENOUS ONCE
Status: DISCONTINUED | OUTPATIENT
Start: 2020-01-31 | End: 2020-01-31 | Stop reason: HOSPADM

## 2020-01-31 RX ORDER — BUPIVACAINE HYDROCHLORIDE 5 MG/ML
INJECTION, SOLUTION EPIDURAL; INTRACAUDAL
Status: COMPLETED | OUTPATIENT
Start: 2020-01-31 | End: 2020-01-31

## 2020-01-31 RX ORDER — SENNA PLUS 8.6 MG/1
1 TABLET ORAL DAILY PRN
Status: DISCONTINUED | OUTPATIENT
Start: 2020-01-31 | End: 2020-02-01

## 2020-01-31 RX ORDER — FENTANYL CITRATE 50 UG/ML
INJECTION, SOLUTION INTRAMUSCULAR; INTRAVENOUS
Status: COMPLETED
Start: 2020-01-31 | End: 2020-01-31

## 2020-01-31 RX ORDER — ROCURONIUM BROMIDE 10 MG/ML
INJECTION, SOLUTION INTRAVENOUS PRN
Status: DISCONTINUED | OUTPATIENT
Start: 2020-01-31 | End: 2020-01-31 | Stop reason: SDUPTHER

## 2020-01-31 RX ORDER — DOCUSATE SODIUM 100 MG/1
100 CAPSULE, LIQUID FILLED ORAL 2 TIMES DAILY
Status: DISCONTINUED | OUTPATIENT
Start: 2020-01-31 | End: 2020-02-01

## 2020-01-31 RX ORDER — MIDAZOLAM HYDROCHLORIDE 1 MG/ML
1 INJECTION INTRAMUSCULAR; INTRAVENOUS ONCE
Status: DISCONTINUED | OUTPATIENT
Start: 2020-01-31 | End: 2020-01-31 | Stop reason: HOSPADM

## 2020-01-31 RX ORDER — ACETAMINOPHEN 500 MG
1000 TABLET ORAL ONCE
Status: COMPLETED | OUTPATIENT
Start: 2020-01-31 | End: 2020-01-31

## 2020-01-31 RX ORDER — SODIUM PHOSPHATE, DIBASIC AND SODIUM PHOSPHATE, MONOBASIC 7; 19 G/133ML; G/133ML
1 ENEMA RECTAL DAILY PRN
Status: DISCONTINUED | OUTPATIENT
Start: 2020-01-31 | End: 2020-02-01 | Stop reason: HOSPADM

## 2020-01-31 RX ORDER — ASPIRIN 81 MG/1
81 TABLET, CHEWABLE ORAL DAILY
Status: DISCONTINUED | OUTPATIENT
Start: 2020-01-31 | End: 2020-02-01 | Stop reason: HOSPADM

## 2020-01-31 RX ORDER — ACETAMINOPHEN 500 MG
1000 TABLET ORAL EVERY 6 HOURS
Status: DISCONTINUED | OUTPATIENT
Start: 2020-01-31 | End: 2020-02-01 | Stop reason: HOSPADM

## 2020-01-31 RX ORDER — GABAPENTIN 600 MG/1
600 TABLET ORAL ONCE
Status: COMPLETED | OUTPATIENT
Start: 2020-01-31 | End: 2020-01-31

## 2020-01-31 RX ORDER — FENTANYL CITRATE 50 UG/ML
25 INJECTION, SOLUTION INTRAMUSCULAR; INTRAVENOUS EVERY 5 MIN PRN
Status: DISCONTINUED | OUTPATIENT
Start: 2020-01-31 | End: 2020-01-31 | Stop reason: HOSPADM

## 2020-01-31 RX ORDER — ONDANSETRON 2 MG/ML
4 INJECTION INTRAMUSCULAR; INTRAVENOUS EVERY 6 HOURS PRN
Status: DISCONTINUED | OUTPATIENT
Start: 2020-01-31 | End: 2020-02-01 | Stop reason: HOSPADM

## 2020-01-31 RX ORDER — DEXAMETHASONE SODIUM PHOSPHATE 10 MG/ML
10 INJECTION INTRAMUSCULAR; INTRAVENOUS ONCE
Status: COMPLETED | OUTPATIENT
Start: 2020-01-31 | End: 2020-01-31

## 2020-01-31 RX ORDER — SODIUM CHLORIDE 0.9 % (FLUSH) 0.9 %
10 SYRINGE (ML) INJECTION EVERY 12 HOURS SCHEDULED
Status: DISCONTINUED | OUTPATIENT
Start: 2020-01-31 | End: 2020-02-01 | Stop reason: HOSPADM

## 2020-01-31 RX ORDER — NEOSTIGMINE METHYLSULFATE 5 MG/5 ML
SYRINGE (ML) INTRAVENOUS PRN
Status: DISCONTINUED | OUTPATIENT
Start: 2020-01-31 | End: 2020-01-31 | Stop reason: SDUPTHER

## 2020-01-31 RX ORDER — KETOROLAC TROMETHAMINE 15 MG/ML
15 INJECTION, SOLUTION INTRAMUSCULAR; INTRAVENOUS EVERY 6 HOURS
Status: DISCONTINUED | OUTPATIENT
Start: 2020-01-31 | End: 2020-02-01 | Stop reason: HOSPADM

## 2020-01-31 RX ORDER — MIDAZOLAM HYDROCHLORIDE 1 MG/ML
INJECTION INTRAMUSCULAR; INTRAVENOUS
Status: COMPLETED
Start: 2020-01-31 | End: 2020-01-31

## 2020-01-31 RX ORDER — SODIUM CHLORIDE 0.9 % (FLUSH) 0.9 %
10 SYRINGE (ML) INJECTION PRN
Status: DISCONTINUED | OUTPATIENT
Start: 2020-01-31 | End: 2020-02-01 | Stop reason: HOSPADM

## 2020-01-31 RX ORDER — SODIUM CHLORIDE, SODIUM LACTATE, POTASSIUM CHLORIDE, CALCIUM CHLORIDE 600; 310; 30; 20 MG/100ML; MG/100ML; MG/100ML; MG/100ML
INJECTION, SOLUTION INTRAVENOUS CONTINUOUS
Status: DISCONTINUED | OUTPATIENT
Start: 2020-01-31 | End: 2020-02-01 | Stop reason: HOSPADM

## 2020-01-31 RX ORDER — OXYCODONE HYDROCHLORIDE 5 MG/1
10 TABLET ORAL EVERY 4 HOURS PRN
Status: DISCONTINUED | OUTPATIENT
Start: 2020-01-31 | End: 2020-02-01 | Stop reason: HOSPADM

## 2020-01-31 RX ORDER — ONDANSETRON 2 MG/ML
INJECTION INTRAMUSCULAR; INTRAVENOUS PRN
Status: DISCONTINUED | OUTPATIENT
Start: 2020-01-31 | End: 2020-01-31 | Stop reason: SDUPTHER

## 2020-01-31 RX ORDER — OXYCODONE HYDROCHLORIDE 5 MG/1
5 TABLET ORAL EVERY 4 HOURS PRN
Status: DISCONTINUED | OUTPATIENT
Start: 2020-01-31 | End: 2020-02-01 | Stop reason: HOSPADM

## 2020-01-31 RX ORDER — FENTANYL CITRATE 50 UG/ML
INJECTION, SOLUTION INTRAMUSCULAR; INTRAVENOUS PRN
Status: DISCONTINUED | OUTPATIENT
Start: 2020-01-31 | End: 2020-01-31 | Stop reason: SDUPTHER

## 2020-01-31 RX ORDER — PROPOFOL 10 MG/ML
INJECTION, EMULSION INTRAVENOUS PRN
Status: DISCONTINUED | OUTPATIENT
Start: 2020-01-31 | End: 2020-01-31 | Stop reason: SDUPTHER

## 2020-01-31 RX ORDER — LIDOCAINE HYDROCHLORIDE 10 MG/ML
INJECTION, SOLUTION EPIDURAL; INFILTRATION; INTRACAUDAL; PERINEURAL PRN
Status: DISCONTINUED | OUTPATIENT
Start: 2020-01-31 | End: 2020-01-31 | Stop reason: SDUPTHER

## 2020-01-31 RX ORDER — EPHEDRINE SULFATE/0.9% NACL/PF 50 MG/5 ML
SYRINGE (ML) INTRAVENOUS PRN
Status: DISCONTINUED | OUTPATIENT
Start: 2020-01-31 | End: 2020-01-31 | Stop reason: SDUPTHER

## 2020-01-31 RX ADMIN — ROCURONIUM BROMIDE 50 MG: 10 INJECTION INTRAVENOUS at 12:01

## 2020-01-31 RX ADMIN — MIDAZOLAM HYDROCHLORIDE 1 MG: 1 INJECTION, SOLUTION INTRAMUSCULAR; INTRAVENOUS at 10:08

## 2020-01-31 RX ADMIN — TRANEXAMIC ACID 1000 MG: 100 INJECTION, SOLUTION INTRAVENOUS at 12:40

## 2020-01-31 RX ADMIN — FENTANYL CITRATE 50 MCG: 50 INJECTION, SOLUTION INTRAMUSCULAR; INTRAVENOUS at 10:08

## 2020-01-31 RX ADMIN — Medication 10 MG: at 13:04

## 2020-01-31 RX ADMIN — PHENYLEPHRINE HYDROCHLORIDE 100 MCG: 10 INJECTION INTRAVENOUS at 16:20

## 2020-01-31 RX ADMIN — SODIUM CHLORIDE, POTASSIUM CHLORIDE, SODIUM LACTATE AND CALCIUM CHLORIDE: 600; 310; 30; 20 INJECTION, SOLUTION INTRAVENOUS at 13:09

## 2020-01-31 RX ADMIN — Medication 0.4 MG: at 12:34

## 2020-01-31 RX ADMIN — CEFAZOLIN 2 G: 10 INJECTION, POWDER, FOR SOLUTION INTRAVENOUS at 16:07

## 2020-01-31 RX ADMIN — SODIUM CHLORIDE, POTASSIUM CHLORIDE, SODIUM LACTATE AND CALCIUM CHLORIDE: 600; 310; 30; 20 INJECTION, SOLUTION INTRAVENOUS at 14:42

## 2020-01-31 RX ADMIN — FENTANYL CITRATE 100 MCG: 50 INJECTION INTRAMUSCULAR; INTRAVENOUS at 12:00

## 2020-01-31 RX ADMIN — BUPIVACAINE HYDROCHLORIDE 20 ML: 5 INJECTION, SOLUTION EPIDURAL; INTRACAUDAL; PERINEURAL at 10:05

## 2020-01-31 RX ADMIN — SODIUM CHLORIDE, POTASSIUM CHLORIDE, SODIUM LACTATE AND CALCIUM CHLORIDE: 600; 310; 30; 20 INJECTION, SOLUTION INTRAVENOUS at 16:39

## 2020-01-31 RX ADMIN — PROPOFOL 150 MG: 10 INJECTION, EMULSION INTRAVENOUS at 12:00

## 2020-01-31 RX ADMIN — GABAPENTIN 600 MG: 600 TABLET ORAL at 10:08

## 2020-01-31 RX ADMIN — Medication 10 MG: at 13:33

## 2020-01-31 RX ADMIN — TRANEXAMIC ACID 1 G: 100 INJECTION, SOLUTION INTRAVENOUS at 15:44

## 2020-01-31 RX ADMIN — PHENYLEPHRINE HYDROCHLORIDE 150 MCG: 10 INJECTION INTRAVENOUS at 12:54

## 2020-01-31 RX ADMIN — LIDOCAINE HYDROCHLORIDE 50 MG: 10 INJECTION, SOLUTION EPIDURAL; INFILTRATION; INTRACAUDAL; PERINEURAL at 12:00

## 2020-01-31 RX ADMIN — SODIUM CHLORIDE, POTASSIUM CHLORIDE, SODIUM LACTATE AND CALCIUM CHLORIDE 1000 ML: 600; 310; 30; 20 INJECTION, SOLUTION INTRAVENOUS at 10:09

## 2020-01-31 RX ADMIN — SODIUM CHLORIDE, POTASSIUM CHLORIDE, SODIUM LACTATE AND CALCIUM CHLORIDE: 600; 310; 30; 20 INJECTION, SOLUTION INTRAVENOUS at 19:46

## 2020-01-31 RX ADMIN — CEFAZOLIN 2 G: 10 INJECTION, POWDER, FOR SOLUTION INTRAVENOUS at 12:07

## 2020-01-31 RX ADMIN — CEFAZOLIN 2 MG: 1 INJECTION, POWDER, FOR SOLUTION INTRAMUSCULAR; INTRAVENOUS at 16:10

## 2020-01-31 RX ADMIN — Medication 10 MG: at 14:40

## 2020-01-31 RX ADMIN — ONDANSETRON 4 MG: 2 INJECTION, SOLUTION INTRAMUSCULAR; INTRAVENOUS at 15:23

## 2020-01-31 RX ADMIN — Medication 0.6 MG: at 16:12

## 2020-01-31 RX ADMIN — PHENYLEPHRINE HYDROCHLORIDE 100 MCG: 10 INJECTION INTRAVENOUS at 14:00

## 2020-01-31 RX ADMIN — PHENYLEPHRINE HYDROCHLORIDE 30 MCG: 10 INJECTION INTRAVENOUS at 13:56

## 2020-01-31 RX ADMIN — Medication 3 MG: at 16:12

## 2020-01-31 RX ADMIN — PHENYLEPHRINE HYDROCHLORIDE 200 MCG: 10 INJECTION INTRAVENOUS at 12:32

## 2020-01-31 RX ADMIN — DEXAMETHASONE SODIUM PHOSPHATE 10 MG: 10 INJECTION INTRAMUSCULAR; INTRAVENOUS at 10:09

## 2020-01-31 RX ADMIN — Medication 10 MG: at 13:20

## 2020-01-31 RX ADMIN — PHENYLEPHRINE HYDROCHLORIDE 100 MCG: 10 INJECTION INTRAVENOUS at 12:43

## 2020-01-31 RX ADMIN — Medication 500 ML: at 16:56

## 2020-01-31 RX ADMIN — Medication 25 ML: at 10:00

## 2020-01-31 RX ADMIN — ACETAMINOPHEN 1000 MG: 500 TABLET ORAL at 10:08

## 2020-01-31 RX ADMIN — PHENYLEPHRINE HYDROCHLORIDE 50 MCG: 10 INJECTION INTRAVENOUS at 14:59

## 2020-01-31 RX ADMIN — DEXTROSE MONOHYDRATE 2 G: 50 INJECTION, SOLUTION INTRAVENOUS at 23:37

## 2020-01-31 RX ADMIN — PHENYLEPHRINE HYDROCHLORIDE 100 MCG: 10 INJECTION INTRAVENOUS at 16:21

## 2020-01-31 ASSESSMENT — PULMONARY FUNCTION TESTS
PIF_VALUE: 16
PIF_VALUE: 0
PIF_VALUE: 16
PIF_VALUE: 0
PIF_VALUE: 14
PIF_VALUE: 17
PIF_VALUE: 16
PIF_VALUE: 17
PIF_VALUE: 17
PIF_VALUE: 16
PIF_VALUE: 16
PIF_VALUE: 18
PIF_VALUE: 15
PIF_VALUE: 8
PIF_VALUE: 14
PIF_VALUE: 17
PIF_VALUE: 17
PIF_VALUE: 14
PIF_VALUE: 17
PIF_VALUE: 16
PIF_VALUE: 16
PIF_VALUE: 17
PIF_VALUE: 16
PIF_VALUE: 17
PIF_VALUE: 20
PIF_VALUE: 18
PIF_VALUE: 18
PIF_VALUE: 17
PIF_VALUE: 16
PIF_VALUE: 15
PIF_VALUE: 17
PIF_VALUE: 15
PIF_VALUE: 17
PIF_VALUE: 16
PIF_VALUE: 17
PIF_VALUE: 0
PIF_VALUE: 15
PIF_VALUE: 18
PIF_VALUE: 18
PIF_VALUE: 17
PIF_VALUE: 16
PIF_VALUE: 18
PIF_VALUE: 15
PIF_VALUE: 16
PIF_VALUE: 16
PIF_VALUE: 15
PIF_VALUE: 17
PIF_VALUE: 16
PIF_VALUE: 16
PIF_VALUE: 15
PIF_VALUE: 17
PIF_VALUE: 17
PIF_VALUE: 16
PIF_VALUE: 15
PIF_VALUE: 16
PIF_VALUE: 15
PIF_VALUE: 16
PIF_VALUE: 18
PIF_VALUE: 16
PIF_VALUE: 16
PIF_VALUE: 17
PIF_VALUE: 17
PIF_VALUE: 4
PIF_VALUE: 0
PIF_VALUE: 17
PIF_VALUE: 17
PIF_VALUE: 18
PIF_VALUE: 17
PIF_VALUE: 15
PIF_VALUE: 16
PIF_VALUE: 17
PIF_VALUE: 16
PIF_VALUE: 16
PIF_VALUE: 17
PIF_VALUE: 15
PIF_VALUE: 15
PIF_VALUE: 17
PIF_VALUE: 14
PIF_VALUE: 15
PIF_VALUE: 16
PIF_VALUE: 17
PIF_VALUE: 16
PIF_VALUE: 16
PIF_VALUE: 17
PIF_VALUE: 15
PIF_VALUE: 17
PIF_VALUE: 15
PIF_VALUE: 17
PIF_VALUE: 17
PIF_VALUE: 15
PIF_VALUE: 17
PIF_VALUE: 16
PIF_VALUE: 16
PIF_VALUE: 18
PIF_VALUE: 17
PIF_VALUE: 1
PIF_VALUE: 13
PIF_VALUE: 17
PIF_VALUE: 17
PIF_VALUE: 0
PIF_VALUE: 16
PIF_VALUE: 17
PIF_VALUE: 16
PIF_VALUE: 17
PIF_VALUE: 15
PIF_VALUE: 17
PIF_VALUE: 17
PIF_VALUE: 16
PIF_VALUE: 17
PIF_VALUE: 16
PIF_VALUE: 17
PIF_VALUE: 16
PIF_VALUE: 17
PIF_VALUE: 16
PIF_VALUE: 17
PIF_VALUE: 18
PIF_VALUE: 2
PIF_VALUE: 15
PIF_VALUE: 16
PIF_VALUE: 15
PIF_VALUE: 17
PIF_VALUE: 15
PIF_VALUE: 17
PIF_VALUE: 17
PIF_VALUE: 15
PIF_VALUE: 16
PIF_VALUE: 17
PIF_VALUE: 17
PIF_VALUE: 16
PIF_VALUE: 17
PIF_VALUE: 17
PIF_VALUE: 18
PIF_VALUE: 17
PIF_VALUE: 16
PIF_VALUE: 16
PIF_VALUE: 20
PIF_VALUE: 15
PIF_VALUE: 15
PIF_VALUE: 17
PIF_VALUE: 18
PIF_VALUE: 16
PIF_VALUE: 16
PIF_VALUE: 17
PIF_VALUE: 15
PIF_VALUE: 15
PIF_VALUE: 17
PIF_VALUE: 1
PIF_VALUE: 20
PIF_VALUE: 15
PIF_VALUE: 16
PIF_VALUE: 17
PIF_VALUE: 16
PIF_VALUE: 15
PIF_VALUE: 17
PIF_VALUE: 16
PIF_VALUE: 16
PIF_VALUE: 1
PIF_VALUE: 17
PIF_VALUE: 16
PIF_VALUE: 17
PIF_VALUE: 1
PIF_VALUE: 15
PIF_VALUE: 17
PIF_VALUE: 16
PIF_VALUE: 2
PIF_VALUE: 16
PIF_VALUE: 17
PIF_VALUE: 16
PIF_VALUE: 17
PIF_VALUE: 16
PIF_VALUE: 16
PIF_VALUE: 20
PIF_VALUE: 17
PIF_VALUE: 18
PIF_VALUE: 17
PIF_VALUE: 16
PIF_VALUE: 1
PIF_VALUE: 16
PIF_VALUE: 15
PIF_VALUE: 16
PIF_VALUE: 16
PIF_VALUE: 17
PIF_VALUE: 17
PIF_VALUE: 16
PIF_VALUE: 17
PIF_VALUE: 15
PIF_VALUE: 16
PIF_VALUE: 16
PIF_VALUE: 1
PIF_VALUE: 3
PIF_VALUE: 15
PIF_VALUE: 17
PIF_VALUE: 16
PIF_VALUE: 17
PIF_VALUE: 16
PIF_VALUE: 15
PIF_VALUE: 7
PIF_VALUE: 16
PIF_VALUE: 17
PIF_VALUE: 17
PIF_VALUE: 16
PIF_VALUE: 17
PIF_VALUE: 15
PIF_VALUE: 17
PIF_VALUE: 15
PIF_VALUE: 1
PIF_VALUE: 16
PIF_VALUE: 17
PIF_VALUE: 16
PIF_VALUE: 21
PIF_VALUE: 16
PIF_VALUE: 17
PIF_VALUE: 17
PIF_VALUE: 16
PIF_VALUE: 17
PIF_VALUE: 18
PIF_VALUE: 1
PIF_VALUE: 15
PIF_VALUE: 16
PIF_VALUE: 15
PIF_VALUE: 1
PIF_VALUE: 16
PIF_VALUE: 17
PIF_VALUE: 16
PIF_VALUE: 15
PIF_VALUE: 16
PIF_VALUE: 16
PIF_VALUE: 17
PIF_VALUE: 16
PIF_VALUE: 18
PIF_VALUE: 15
PIF_VALUE: 16
PIF_VALUE: 17
PIF_VALUE: 14
PIF_VALUE: 17

## 2020-01-31 ASSESSMENT — PAIN SCALES - GENERAL
PAINLEVEL_OUTOF10: 0

## 2020-01-31 ASSESSMENT — PAIN - FUNCTIONAL ASSESSMENT: PAIN_FUNCTIONAL_ASSESSMENT: 0-10

## 2020-01-31 NOTE — BRIEF OP NOTE
Brief Postoperative Note  ______________________________________________________________    Patient: Tanya Laws  YOB: 1945  MRN: [id number]  Date of Procedure: 1/31/2020    Pre-Op Diagnosis: ROTATOR CUFF ARTHROPATHY RIGHT SHOULDER    Post-Op Diagnosis: Same       Procedure(s):  REVERSE TOTAL SHOULDER ARTHROPLASTY, RIGHT    Anesthesia: Regional, General    Surgeon(s):  Violet Butler MD    Assistant(s): Kd,  PGY-4; Sarah Mcmahon DO PGY-2    Estimated Blood Loss (mL): 200     Fluids: 1348 mL    Complications: none    Specimens:   * No specimens in log *    Implants:  Implant Name Type Inv.  Item Serial No.  Lot No. LRB No. Used   GLENOSPHERE MINI BASEPLATE 60QI Shoulder/Arm/Wrist/Hand GLENOSPHERE MINI BASEPLATE 90VW  [employer] [id number] Right 1   SCREW CENT REV SHOULDER COMP 6.5X35MM Shoulder/Arm/Wrist/Hand SCREW CENT REV SHOULDER COMP 6.5X35MM  [employer] [id number] Right 1   SCREW NON-LK 4.73S48CG Screw/Plate/Nail/Isrrael SCREW NON-LK 4.04O82IR  [employer] [id number] Right 1   SCREW NON-LK 4.92A53YA Screw/Plate/Nail/Isrrael SCREW NON-LK 4.88M69HT  [employer] [id number] Right 1   SCREW LK 4.35E64GJ Screw/Plate/Nail/Isrrael SCREW LK 4.03F44TZ  [employer] [id number] Right 1   SCREW LK 4.98E26MY Screw/Plate/Nail/Isrrael SCREW LK 4.46E96MK  [employer] [id number] Right 1   COMPONENT SHOULDER REVERS GLNSP 36MM Shoulder/Arm/Wrist/Hand COMPONENT SHOULDER REVERS GLNSP 36MM  [employer] [id number] Right 1   IMPL SHOULDER HUMERAL TRAY 36MM STD Shoulder/Arm/Wrist/Hand IMPL SHOULDER HUMERAL TRAY 36MM STD  [employer] [id number] Right 1   IMPL SHOULDER HUMERAL TRAY STD +5 Shoulder/Arm/Wrist/Hand IMPL SHOULDER HUMERAL TRAY STD +5  [employer] [id number] Right 1   IMPL SHOULDER STEM W ALIGN HOLE [id number] Shoulder/Arm/Wrist/Hand IMPL SHOULDER STEM W ALIGN HOLE [id number]  [employer] [id number] Right 1     Drains: * No LDAs found *    Findings: severe right cuff arthropathy changes; see Op Note for details    Johana Espinoza, DO  Date: 1/31/2020  Time: 4:07 PM

## 2020-01-31 NOTE — H&P
Rfl:     furosemide (LASIX) 20 MG tablet, Take 20 mg by mouth daily , Disp: , Rfl:     alendronate (FOSAMAX) 70 MG tablet, Take 70 mg by mouth every 7 days monday, Disp: , Rfl:     acetaminophen (TYLENOL) 500 MG tablet, Take 500 mg by mouth as needed, Disp: , Rfl:     Handicap Placard MISC, by Does not apply route Disability parking for 5 years, Disp: 1 each, Rfl: 0     Allergies  is allergic to no known allergies. Family History  family history includes Heart Disease in her mother. Social History   reports that she has been smoking cigarettes. She has a 51.00 pack-year smoking history. She has never used smokeless tobacco.   reports current alcohol use. reports no history of drug use.     Marital Status:   Children: one daughter, three sons and four grandchildren  Occupation: retired realtor     Review of Systems  CONSTITUTIONAL:  negative   EYES:  glasses  HENT:  negative   RESPIRATORY:  negative   CARDIOVASCULAR:  negative   GASTROINTESTINAL:  negative   GENITOURINARY:  negative   INTEGUMENT/BREAST:  dryness  HEMATOLOGIC/LYMPHATIC:  negative   ALLERGIC/IMMUNOLOGIC:  negative   ENDOCRINE:  negative   MUSCULOSKELETAL:  myalgias, arthralgias and stiff joints  NEUROLOGICAL:  negative   BEHAVIOR/PSYCH:  negative      OBJECTIVE:      VITALS:  height is 5' 4\" (1.626 m) and weight is 173 lb 4.5 oz (78.6 kg). Her temporal temperature is 97.3 °F (36.3 °C). Her blood pressure is 103/71 and her pulse is 98. Her respiration is 16 and oxygen saturation is 97%. CONSTITUTIONAL: Alert & oriented x 3, no acute distress. SKIN:  Warm and dry, no rash or erythema. HEAD:  Normocephalic, atraumatic. EYES: PERRLA. EOMs intact. Wears glasses. EARS:  Hearing grossly normal.    NOSE:  Nares patent. Septum midline. No rhinorrhea   MOUTH/THROAT:  Unremarkable   NECK: Supple with no lymphadenopathy. LUNGS: Clear to auscultation throughout. No wheezes, rales or rhonchi. CARDIOVASCULAR: Heart rate regular. Rhythm without murmur, click, gallop or rub. ABDOMEN: Soft, non tender, non distended, no masses or organomegaly. EXTREMITIES: No clubbing, cyanosis or edema.       TESTING:      EKG: NSR 1/17/2020  Labs pending: drawn 1/17/2020   Chest XRay: 1/17/2020     IMPRESSION: 1. Rotator cuff arthropathy right shoulder  2. Past Medical History in prose (no negatives)    has a past medical history of Arthritis, Basal cell carcinoma (BCC) of left forearm, Chronic back pain, Depression, Eczema, Hypertension, Hyponatremia, Iron deficiency anemia (04/28/2017), Knee pain, right, PONV (postoperative nausea and vomiting), Rotator cuff disorder, Snores, and Wears glasses. PLAN: 1.    Reverse total arthroplasty right shoulder     eJremi ROSENTHAL, AE-C  Electronically signed 1/17/2020 at 11:29 AM

## 2020-01-31 NOTE — ANESTHESIA PRE PROCEDURE
Continuous Taz Parra MD        lactated ringers infusion 1,000 mL  1,000 mL Intravenous Continuous Taz Parra MD           Allergies: Allergies   Allergen Reactions    No Known Allergies        Problem List:    Patient Active Problem List   Diagnosis Code    Open fracture of left ankle S82.892B    Hyponatremia E87.1    Osteoarthrosis involving lower leg M17.10    Primary osteoarthritis of right knee M17.11    Acute meniscal tear, medial S83.249A    Primary osteoarthritis of both knees M17.0    Rotator cuff arthropathy of right shoulder M12.811       Past Medical History:        Diagnosis Date    Arthritis     Basal cell carcinoma (BCC) of left forearm     Chronic back pain     Depression     Eczema     Hypertension     Hyponatremia     see's nephrologist for this, on lasix for this    Iron deficiency anemia 04/28/2017    just recieved iron infusion for first time, ordered by PCP    Knee pain, right     PONV (postoperative nausea and vomiting)     Rotator cuff disorder     rt shoulder    Snores     Wears glasses        Past Surgical History:        Procedure Laterality Date    ANKLE ARTHROSCOPY Left 02/06/2015    ANKLE SURGERY Left 8/13/14    open orif    CLOSED TX ULNAR FRACTURE PROX END W MANIPULATION Right 8/10/2018    CLOSED REDUCTION PERC.  PINNING RIGHT DISTAL RADIUS  (K-WIRES) performed by Grayson Cha MD at [address] [place] [address]    EYE SURGERY Bilateral 04/2017    cataract with lens implants    SKIN CANCER EXCISION Left 02/2017    2 months ago    TOTAL KNEE ARTHROPLASTY Right 5/19/2017    KNEE TOTAL ARTHROPLASTY - Trice El performed by Grayson Cha MD at [address] [place] [address] History:    Social History     Tobacco Use    Smoking status: Current Some Day Smoker     Packs/day: 1.00     Years: 51.00     Pack years: 51.00     Types: Cigarettes    Smokeless tobacco: Never Used   Substance Use Topics    Alcohol use: Yes     Comment: 1 time per month                                Ready to quit: Not Answered  Counseling given: Not Answered      Vital Signs (Current):   Vitals:    01/31/20 0900   BP: 137/63   Pulse: 82   Resp: 16   Temp: 97.5 °F (36.4 °C)   TempSrc: Temporal   SpO2: 99%   Weight: 173 lb (78.5 kg)   Height: 5' 4\" (1.626 m)                                              BP Readings from Last 3 Encounters:   01/31/20 137/63   01/17/20 103/71   08/10/18 97/60       NPO Status: Time of last liquid consumption: 2300                        Time of last solid consumption: 2300                        Date of last liquid consumption: 01/30/20                        Date of last solid food consumption: 01/30/20    BMI:   Wt Readings from Last 3 Encounters:   01/31/20 173 lb (78.5 kg)   01/17/20 173 lb 4.5 oz (78.6 kg)   01/07/20 178 lb (80.7 kg)     Body mass index is 29.7 kg/m². CBC:   Lab Results   Component Value Date    WBC 8.3 01/17/2020    RBC 3.89 01/17/2020    HGB 11.8 01/17/2020    HCT 38.1 01/17/2020    MCV 97.9 01/17/2020    RDW 15.3 01/17/2020     01/17/2020       CMP:   Lab Results   Component Value Date     01/17/2020    K 3.8 01/17/2020     01/17/2020    CO2 24 01/17/2020    BUN 11 01/17/2020    CREATININE 0.84 01/17/2020    GFRAA >60 01/17/2020    LABGLOM >60 01/17/2020    GLUCOSE 99 01/17/2020    GLUCOSE 88 06/28/2017    PROT 7.8 01/17/2020    CALCIUM 10.1 01/17/2020    BILITOT 0.31 01/17/2020    ALKPHOS 103 01/17/2020    AST 18 01/17/2020    ALT 11 01/17/2020       POC Tests: No results for input(s): POCGLU, POCNA, POCK, POCCL, POCBUN, POCHEMO, POCHCT in the last 72 hours.     Coags:   Lab Results   Component Value Date    PROTIME 10.8 08/12/2014    INR 1.0 08/12/2014    APTT 53.5 08/12/2014       HCG (If Applicable): No results found for: PREGTESTUR, PREGSERUM, HCG, HCGQUANT     ABGs: No results found for: PHART, PO2ART, KST4RRF, EKD5EJY, BEART, Q6UYFQVM     Type & Screen (If Applicable):  No results found for: Waqar Colby [address]    Anesthesia Evaluation  Patient summary reviewed and Nursing notes reviewed   history of anesthetic complications: PONV. Airway: Mallampati: II     Neck ROM: full  Mouth opening: > = 3 FB Dental: normal exam         Pulmonary:normal exam                               Cardiovascular:  Exercise tolerance: good (>4 METS),   (+) hypertension:,     (-) CAD, CABG/stent, dysrhythmias,  angina,  CHF and orthopnea      Rhythm: regular  Rate: normal           Beta Blocker:  Not on Beta Blocker         Neuro/Psych:   (+) psychiatric history:            GI/Hepatic/Renal: Neg GI/Hepatic/Renal ROS            Endo/Other: Negative Endo/Other ROS                    Abdominal:           Vascular:                                      Anesthesia Plan      general and regional     ASA 3       Induction: intravenous. MIPS: Postoperative opioids intended and Prophylactic antiemetics administered. Anesthetic plan and risks discussed with patient.       Plan discussed with CRNA and surgical team.                Tarun Martinez MD   1/31/2020

## 2020-02-01 VITALS
HEIGHT: 64 IN | BODY MASS INDEX: 29.53 KG/M2 | HEART RATE: 62 BPM | WEIGHT: 173 LBS | TEMPERATURE: 97.7 F | SYSTOLIC BLOOD PRESSURE: 100 MMHG | RESPIRATION RATE: 16 BRPM | DIASTOLIC BLOOD PRESSURE: 55 MMHG | OXYGEN SATURATION: 98 %

## 2020-02-01 PROBLEM — D64.9 ACUTE ON CHRONIC ANEMIA: Status: ACTIVE | Noted: 2020-02-01

## 2020-02-01 PROBLEM — E53.8 FOLATE DEFICIENCY: Status: ACTIVE | Noted: 2020-02-01

## 2020-02-01 PROBLEM — I10 BENIGN ESSENTIAL HTN: Status: ACTIVE | Noted: 2020-02-01

## 2020-02-01 PROBLEM — E61.1 IRON DEFICIENCY: Status: ACTIVE | Noted: 2020-02-01

## 2020-02-01 LAB
ABSOLUTE EOS #: 0.23 K/UL (ref 0–0.44)
ABSOLUTE IMMATURE GRANULOCYTE: 0.04 K/UL (ref 0–0.3)
ABSOLUTE LYMPH #: 0.89 K/UL (ref 1.1–3.7)
ABSOLUTE MONO #: 1.19 K/UL (ref 0.1–1.2)
ABSOLUTE RETIC #: 0.03 M/UL (ref 0.03–0.08)
ANION GAP SERPL CALCULATED.3IONS-SCNC: 10 MMOL/L (ref 9–17)
BASOPHILS # BLD: 0 % (ref 0–2)
BASOPHILS ABSOLUTE: <0.03 K/UL (ref 0–0.2)
BUN BLDV-MCNC: 13 MG/DL (ref 8–23)
BUN/CREAT BLD: ABNORMAL (ref 9–20)
CALCIUM SERPL-MCNC: 8.8 MG/DL (ref 8.6–10.4)
CHLORIDE BLD-SCNC: 103 MMOL/L (ref 98–107)
CO2: 20 MMOL/L (ref 20–31)
CREAT SERPL-MCNC: 0.87 MG/DL (ref 0.5–0.9)
DIFFERENTIAL TYPE: ABNORMAL
EOSINOPHILS RELATIVE PERCENT: 2 % (ref 1–4)
FERRITIN: 146 UG/L (ref 13–150)
FOLATE: 6.1 NG/ML
GFR AFRICAN AMERICAN: >60 ML/MIN
GFR NON-AFRICAN AMERICAN: >60 ML/MIN
GFR SERPL CREATININE-BSD FRML MDRD: ABNORMAL ML/MIN/{1.73_M2}
GFR SERPL CREATININE-BSD FRML MDRD: ABNORMAL ML/MIN/{1.73_M2}
GLUCOSE BLD-MCNC: 115 MG/DL (ref 70–99)
HCT VFR BLD CALC: 25.9 % (ref 36.3–47.1)
HEMOGLOBIN: 8.1 G/DL (ref 11.9–15.1)
IMMATURE GRANULOCYTES: 0 %
IMMATURE RETIC FRACT: 9.3 % (ref 2.7–18.3)
IRON SATURATION: 12 % (ref 20–55)
IRON: 25 UG/DL (ref 37–145)
LYMPHOCYTES # BLD: 7 % (ref 24–43)
MCH RBC QN AUTO: 31.3 PG (ref 25.2–33.5)
MCHC RBC AUTO-ENTMCNC: 31.3 G/DL (ref 28.4–34.8)
MCV RBC AUTO: 100 FL (ref 82.6–102.9)
MONOCYTES # BLD: 10 % (ref 3–12)
NRBC AUTOMATED: 0 PER 100 WBC
OSMOLALITY URINE: 183 MOSM/KG (ref 80–1300)
PDW BLD-RTO: 15.5 % (ref 11.8–14.4)
PLATELET # BLD: 213 K/UL (ref 138–453)
PLATELET ESTIMATE: ABNORMAL
PMV BLD AUTO: 10.5 FL (ref 8.1–13.5)
POTASSIUM SERPL-SCNC: 4.6 MMOL/L (ref 3.7–5.3)
RBC # BLD: 2.59 M/UL (ref 3.95–5.11)
RBC # BLD: ABNORMAL 10*6/UL
RETIC %: 1.1 % (ref 0.5–1.9)
RETIC HEMOGLOBIN: 35.4 PG (ref 28.2–35.7)
SEG NEUTROPHILS: 81 % (ref 36–65)
SEGMENTED NEUTROPHILS ABSOLUTE COUNT: 9.75 K/UL (ref 1.5–8.1)
SERUM OSMOLALITY: 284 MOSM/KG (ref 275–295)
SODIUM BLD-SCNC: 133 MMOL/L (ref 135–144)
SODIUM,UR: <20 MMOL/L
TOTAL IRON BINDING CAPACITY: 206 UG/DL (ref 250–450)
TOTAL PROTEIN, URINE: 5 MG/DL
UNSATURATED IRON BINDING CAPACITY: 181 UG/DL (ref 112–347)
VITAMIN B-12: 374 PG/ML (ref 232–1245)
WBC # BLD: 12.1 K/UL (ref 3.5–11.3)
WBC # BLD: ABNORMAL 10*3/UL

## 2020-02-01 PROCEDURE — 97166 OT EVAL MOD COMPLEX 45 MIN: CPT

## 2020-02-01 PROCEDURE — 85025 COMPLETE CBC W/AUTO DIFF WBC: CPT

## 2020-02-01 PROCEDURE — 84156 ASSAY OF PROTEIN URINE: CPT

## 2020-02-01 PROCEDURE — 83550 IRON BINDING TEST: CPT

## 2020-02-01 PROCEDURE — 97535 SELF CARE MNGMENT TRAINING: CPT

## 2020-02-01 PROCEDURE — 2580000003 HC RX 258: Performed by: STUDENT IN AN ORGANIZED HEALTH CARE EDUCATION/TRAINING PROGRAM

## 2020-02-01 PROCEDURE — 82728 ASSAY OF FERRITIN: CPT

## 2020-02-01 PROCEDURE — 84300 ASSAY OF URINE SODIUM: CPT

## 2020-02-01 PROCEDURE — 97162 PT EVAL MOD COMPLEX 30 MIN: CPT

## 2020-02-01 PROCEDURE — 97530 THERAPEUTIC ACTIVITIES: CPT

## 2020-02-01 PROCEDURE — 82746 ASSAY OF FOLIC ACID SERUM: CPT

## 2020-02-01 PROCEDURE — 83540 ASSAY OF IRON: CPT

## 2020-02-01 PROCEDURE — 6360000002 HC RX W HCPCS: Performed by: STUDENT IN AN ORGANIZED HEALTH CARE EDUCATION/TRAINING PROGRAM

## 2020-02-01 PROCEDURE — 83930 ASSAY OF BLOOD OSMOLALITY: CPT

## 2020-02-01 PROCEDURE — 85045 AUTOMATED RETICULOCYTE COUNT: CPT

## 2020-02-01 PROCEDURE — 83935 ASSAY OF URINE OSMOLALITY: CPT

## 2020-02-01 PROCEDURE — 6370000000 HC RX 637 (ALT 250 FOR IP): Performed by: STUDENT IN AN ORGANIZED HEALTH CARE EDUCATION/TRAINING PROGRAM

## 2020-02-01 PROCEDURE — 99221 1ST HOSP IP/OBS SF/LOW 40: CPT | Performed by: INTERNAL MEDICINE

## 2020-02-01 PROCEDURE — 82607 VITAMIN B-12: CPT

## 2020-02-01 PROCEDURE — 36415 COLL VENOUS BLD VENIPUNCTURE: CPT

## 2020-02-01 PROCEDURE — 80048 BASIC METABOLIC PNL TOTAL CA: CPT

## 2020-02-01 RX ORDER — LANOLIN ALCOHOL/MO/W.PET/CERES
325 CREAM (GRAM) TOPICAL
Qty: 30 TABLET | Refills: 2 | Status: SHIPPED | OUTPATIENT
Start: 2020-02-01

## 2020-02-01 RX ORDER — ASCORBIC ACID 500 MG
500 TABLET ORAL DAILY
Qty: 30 TABLET | Refills: 1 | Status: SHIPPED | OUTPATIENT
Start: 2020-02-01

## 2020-02-01 RX ORDER — FOLIC ACID 1 MG/1
1 TABLET ORAL DAILY
Status: DISCONTINUED | OUTPATIENT
Start: 2020-02-01 | End: 2020-02-01 | Stop reason: HOSPADM

## 2020-02-01 RX ORDER — SENNA AND DOCUSATE SODIUM 50; 8.6 MG/1; MG/1
2 TABLET, FILM COATED ORAL 2 TIMES DAILY
Status: DISCONTINUED | OUTPATIENT
Start: 2020-02-01 | End: 2020-02-01 | Stop reason: HOSPADM

## 2020-02-01 RX ORDER — LANOLIN ALCOHOL/MO/W.PET/CERES
325 CREAM (GRAM) TOPICAL 2 TIMES DAILY WITH MEALS
Status: DISCONTINUED | OUTPATIENT
Start: 2020-02-01 | End: 2020-02-01 | Stop reason: HOSPADM

## 2020-02-01 RX ORDER — FOLIC ACID 1 MG/1
1 TABLET ORAL DAILY
Qty: 30 TABLET | Refills: 2 | Status: SHIPPED | OUTPATIENT
Start: 2020-02-01

## 2020-02-01 RX ORDER — ASCORBIC ACID 500 MG
500 TABLET ORAL DAILY
Status: DISCONTINUED | OUTPATIENT
Start: 2020-02-01 | End: 2020-02-01 | Stop reason: HOSPADM

## 2020-02-01 RX ORDER — POLYETHYLENE GLYCOL 3350 17 G/17G
17 POWDER, FOR SOLUTION ORAL DAILY
Status: DISCONTINUED | OUTPATIENT
Start: 2020-02-01 | End: 2020-02-01 | Stop reason: HOSPADM

## 2020-02-01 RX ORDER — OXYCODONE HYDROCHLORIDE AND ACETAMINOPHEN 5; 325 MG/1; MG/1
1-2 TABLET ORAL EVERY 6 HOURS PRN
Qty: 56 TABLET | Refills: 0 | Status: SHIPPED | OUTPATIENT
Start: 2020-02-01 | End: 2020-02-13 | Stop reason: SDUPTHER

## 2020-02-01 RX ORDER — SENNA AND DOCUSATE SODIUM 50; 8.6 MG/1; MG/1
2 TABLET, FILM COATED ORAL 2 TIMES DAILY PRN
Qty: 60 TABLET | Refills: 1 | Status: SHIPPED | OUTPATIENT
Start: 2020-02-01

## 2020-02-01 RX ADMIN — KETOROLAC TROMETHAMINE 15 MG: 15 INJECTION, SOLUTION INTRAMUSCULAR; INTRAVENOUS at 03:26

## 2020-02-01 RX ADMIN — FUROSEMIDE 20 MG: 20 TABLET ORAL at 09:03

## 2020-02-01 RX ADMIN — KETOROLAC TROMETHAMINE 15 MG: 15 INJECTION, SOLUTION INTRAMUSCULAR; INTRAVENOUS at 14:52

## 2020-02-01 RX ADMIN — ACETAMINOPHEN 1000 MG: 500 TABLET ORAL at 10:56

## 2020-02-01 RX ADMIN — DEXTROSE MONOHYDRATE 2 G: 50 INJECTION, SOLUTION INTRAVENOUS at 07:02

## 2020-02-01 RX ADMIN — KETOROLAC TROMETHAMINE 15 MG: 15 INJECTION, SOLUTION INTRAMUSCULAR; INTRAVENOUS at 09:02

## 2020-02-01 RX ADMIN — SODIUM CHLORIDE, POTASSIUM CHLORIDE, SODIUM LACTATE AND CALCIUM CHLORIDE: 600; 310; 30; 20 INJECTION, SOLUTION INTRAVENOUS at 07:02

## 2020-02-01 RX ADMIN — LISINOPRIL 2.5 MG: 2.5 TABLET ORAL at 09:03

## 2020-02-01 RX ADMIN — ASPIRIN 81 MG: 81 TABLET, CHEWABLE ORAL at 09:03

## 2020-02-01 ASSESSMENT — PAIN DESCRIPTION - DESCRIPTORS
DESCRIPTORS: ACHING
DESCRIPTORS: ACHING

## 2020-02-01 ASSESSMENT — PAIN DESCRIPTION - ORIENTATION
ORIENTATION: RIGHT
ORIENTATION: RIGHT

## 2020-02-01 ASSESSMENT — PAIN SCALES - GENERAL
PAINLEVEL_OUTOF10: 2
PAINLEVEL_OUTOF10: 4
PAINLEVEL_OUTOF10: 2
PAINLEVEL_OUTOF10: 5

## 2020-02-01 ASSESSMENT — PAIN DESCRIPTION - PAIN TYPE
TYPE: ACUTE PAIN
TYPE: ACUTE PAIN

## 2020-02-01 ASSESSMENT — PAIN DESCRIPTION - FREQUENCY: FREQUENCY: INTERMITTENT

## 2020-02-01 ASSESSMENT — PAIN DESCRIPTION - LOCATION
LOCATION: SHOULDER
LOCATION: SHOULDER

## 2020-02-01 NOTE — PROGRESS NOTES
Orthopedic Progress Note    Patient:  Madelin Gallardo  YOB: 1945     69 y.o. female    Subjective:  Patient seen and examined this morning. Anesthesia catheter in place. Did have post operative hypotension, stabilized at this time. No complaints or concerns at this time. Would like to go home today, son can bring her home. No new issues overnight per nursing and patient. Pain controlled on current regimen. Denies fever, HA, CP, SOB, N/V, dysuria, numbness or tingling. -BM/-flatus. Tolerating PO intake. PT to evaluate and treat today. Vitals reviewed, afebrile    Objective:   Vitals:    02/01/20 0740   BP: (!) 119/54   Pulse: 67   Resp: 16   Temp: 97.1 °F (36.2 °C)   SpO2: 95%     Gen: NAD, cooperative  Cardiovascular: Regular rate, no dependent edema, distal pulses 2+  Respiratory: Chest symmetric, no accessory muscle use, normal respirations, no audible wheezes    MSK: RUE: Sling in place. Dressing clean, dry, intact. Compartments soft. Median/Radial/Ulnar/AIN/PIN motor intact. Slight weakness with full wrist extension, but is able to extend fingers. Median/Radial/Ulnar nerve sensation intact to light touch but decreased due to block. 2+ rad pulse. Does have anesthesia catheter block in place    Recent Labs     02/01/20  0524   WBC 12.1*   HGB 8.1*   HCT 25.9*      *   K 4.6   BUN 13   CREATININE 0.87   GLUCOSE 115*      Meds:  ASA  See rec for complete list    Impression/plan: 69 y.o. female s/p right reverse total shoulder arthroplasty, POD#1    -WB status: Non weight bearing right upper extremity.  -Maintain abduction sling. [id number] Ramya Mast for elbow/wrist ROM  -Pain control PO/IV Medication. Attempt to Wean IV medications. -DVT ppx: ASA  -Ice/Elevate as needed for pain and swelling.  -Encourage Incentive Spirometry use and deep breathing.    -PT/OT to evaluate and treat.  -Possible DC this afternoon pending PT and IM evaluation  -Follow up with Dr. Lan Plasencia in 10-14 days  -Please page ortho with any questions    ----------------------------------------  Matilda Bearden DO  PGY-2, Department of [address], [place], [place]

## 2020-02-01 NOTE — PROGRESS NOTES
Physical Therapy    Facility/Department: [address] ORTHO/MED SURG  Initial Assessment    NAME: Gentry Jarrell  : 1945  MRN: [id number]    Date of Service: 2020    Discharge Recommendations: Further therapy recommended at discharge. PT Equipment Recommendations  Equipment Needed: (CTA)    Assessment   Body structures, Functions, Activity limitations: Decreased functional mobility ; Decreased endurance;Decreased strength;Decreased safe awareness; Increased pain;Decreased balance;Decreased ROM  Assessment: The pt required Min-Mod A for all mobility due to balance deficits and decreased safety awareness. The pt is unsafe to return home at this time due to high fall risk and cognitive deficits. Recommend continued therapy to maximize safety and independence prior to returning home. Prognosis: Good  Decision Making: Medium Complexity  PT Education: Goals;PT Role;Plan of Care;Transfer Training;General Safety;Gait Training;Functional Mobility Training  REQUIRES PT FOLLOW UP: Yes  Activity Tolerance  Activity Tolerance: Patient limited by cognitive status; Patient limited by endurance       Patient Diagnosis(es): There were no encounter diagnoses. has a past medical history of Arthritis, Basal cell carcinoma (BCC) of left forearm, Chronic back pain, Depression, Eczema, Hypertension, Hyponatremia, Iron deficiency anemia, Knee pain, right, PONV (postoperative nausea and vomiting), Rotator cuff disorder, Snores, and Wears glasses. has a past surgical history that includes Ankle surgery (Left, 14); Ankle arthroscopy (Left, 2015); Dilation and curettage of uterus; Skin cancer excision (Left, 2017); eye surgery (Bilateral, 2017); Total knee arthroplasty (Right, 2017); closed tx ulnar fracture prox end w manipulation (Right, 8/10/2018); and Total shoulder arthroplasty (Right, 2020).     Restrictions  Restrictions/Precautions  Restrictions/Precautions: Weight Bearing, Fall Time   Individual Concurrent Group Co-treatment   Time In [address]         Time Out 0837         Minutes 42         Timed Code Treatment Minutes: 10 Minutes       Kateryna Santos, PT

## 2020-02-01 NOTE — CONSULTS
Past Surgical History:   Procedure Laterality Date    ANKLE ARTHROSCOPY Left 02/06/2015    ANKLE SURGERY Left 8/13/14    open orif    CLOSED TX ULNAR FRACTURE PROX END W MANIPULATION Right 8/10/2018    CLOSED REDUCTION PERC. PINNING RIGHT DISTAL RADIUS  (K-WIRES) performed by Sandi Hoffman MD at [address] [place] [address]      x2    EYE SURGERY Bilateral 04/2017    cataract with lens implants    SHOULDER ARTHROPLASTY Right 01/31/2020    REVERSE TOTAL SHOULDER ARTHROPLASTY     SKIN CANCER EXCISION Left 02/2017    2 months ago   [address] Right 5/19/2017    KNEE TOTAL ARTHROPLASTY - Mariel Infante performed by Sandi Hoffman MD at [place] [address]        Medications Prior to Admission:     Prior to Admission medications    Medication Sig Start Date End Date Taking? Authorizing Provider   oxyCODONE-acetaminophen (PERCOCET) 5-325 MG per tablet Take 1-2 tablets by mouth every 6 hours as needed for Pain for up to 7 days.  2/1/20 2/8/20 Yes Don Hairston DO   vitamin C (VITAMIN C) 500 MG tablet Take 1 tablet by mouth daily 2/1/20  Yes Jennie Morataya MD   sennosides-docusate sodium (SENOKOT-S) 8.6-50 MG tablet Take 2 tablets by mouth 2 times daily as needed for Constipation 2/1/20  Yes Jennie Morataya MD   ferrous sulfate 325 (65 Fe) MG EC tablet Take 1 tablet by mouth daily (with breakfast) 2/1/20  Yes Jennie Morataya MD   folic acid (FOLVITE) 1 MG tablet Take 1 tablet by mouth daily 2/1/20  Yes Jennie Morataya MD   lisinopril (PRINIVIL;ZESTRIL) 5 MG tablet Take 2.5 mg by mouth daily    Yes Historical Provider, MD   furosemide (LASIX) 20 MG tablet Take 20 mg by mouth daily  6/17/15  Yes Historical Provider, MD   Handicap Placard MISC by Does not apply route Disability parking for 5 years 9/3/15   Sandi Hoffman MD   alendronate (FOSAMAX) 70 MG tablet Take 70 mg by mouth every 7 days monday 2/7/15   Historical Provider, MD        Allergies:     No known allergies    Social History:     Tobacco: reports that she has been smoking cigarettes. She has a 51.00 pack-year smoking history. She has never used smokeless tobacco.  Alcohol:      reports current alcohol use. Drug Use:  reports no history of drug use. Family History:     Family History   Problem Relation Age of Onset    Heart Disease Mother        Review of Systems:     Positive and Negative as described in HPI. CONSTITUTIONAL:  negative for fevers, chills, sweats, fatigue, weight loss  HEENT:  negative for vision, hearing changes, runny nose, throat pain  RESPIRATORY:  negative for shortness of breath, cough, congestion, wheezing. CARDIOVASCULAR:  negative for chest pain, palpitations. GASTROINTESTINAL:  negative for nausea, vomiting, diarrhea, constipation, change in bowel habits, abdominal pain   GENITOURINARY:  negative for difficulty of urination, burning with urination, frequency   INTEGUMENT:  negative for rash, skin lesions, easy bruising   HEMATOLOGIC/LYMPHATIC:  negative for swelling/edema   ALLERGIC/IMMUNOLOGIC:  negative for urticaria , itching  ENDOCRINE:  negative increase in drinking, increase in urination, hot or cold intolerance  MUSCULOSKELETAL:  negative joint pains, muscle aches, swelling of joints  NEUROLOGICAL:  negative for headaches, dizziness, lightheadedness, numbness, pain, tingling extremities  BEHAVIOR/PSYCH:  negative for depression, anxiety    Physical Exam:     BP (!) 100/55   Pulse 62   Temp 97.7 °F (36.5 °C) (Oral)   Resp 16   Ht 5' 4\" (1.626 m)   Wt 173 lb (78.5 kg)   SpO2 98%   BMI 29.70 kg/m²   Temp (24hrs), Av.1 °F (35.1 °C), Min:94.5 °F (34.7 °C), Max:98.2 °F (36.8 °C)    No results for input(s): POCGLU in the last 72 hours.     Intake/Output Summary (Last 24 hours) at 2020 1600  Last data filed at 2020 1047  Gross per 24 hour   Intake 550 ml   Output 1640 ml   Net -1090 ml       General Appearance:  alert, well appearing, and in no acute distress  Mental status: oriented to person, place, and time with normal affect  Head:  normocephalic, atraumatic. Eye: no icterus, redness, pupils equal and reactive, extraocular eye movements intact, conjunctiva clear  Ear: normal external ear, no discharge, hearing intact  Nose:  no drainage noted  Mouth: mucous membranes moist  Neck: supple, no carotid bruits, thyroid not palpable  Lungs: Bilateral equal air entry, clear to ausculation, no wheezing, rales or rhonchi, normal effort  Cardiovascular: normal rate, regular rhythm, no murmur, gallop, rub.   Abdomen: Soft, nontender, nondistended, normal bowel sounds, no hepatomegaly or splenomegaly  Neurologic: There are no new focal motor or sensory deficits, normal muscle tone and bulk, no abnormal sensation, normal speech, cranial nerves II through XII grossly intact  Skin: No gross lesions, rashes, bruising or bleeding on exposed skin area  Extremities: Right upper extremity in a sling with right shoulder dressed  Psych: normal affect    Investigations:      Laboratory Testing:  Recent Results (from the past 24 hour(s))   Basic Metabolic Panel w/ Reflex to MG    Collection Time: 02/01/20  5:24 AM   Result Value Ref Range    Glucose 115 (H) 70 - 99 mg/dL    BUN 13 8 - 23 mg/dL    CREATININE 0.87 0.50 - 0.90 mg/dL    Bun/Cre Ratio NOT REPORTED 9 - 20    Calcium 8.8 8.6 - 10.4 mg/dL    Sodium 133 (L) 135 - 144 mmol/L    Potassium 4.6 3.7 - 5.3 mmol/L    Chloride 103 98 - 107 mmol/L    CO2 20 20 - 31 mmol/L    Anion Gap 10 9 - 17 mmol/L    GFR Non-African American >60 >60 mL/min    GFR African American >60 >60 mL/min    GFR Comment          GFR Staging NOT REPORTED    CBC auto differential    Collection Time: 02/01/20  5:24 AM   Result Value Ref Range    WBC 12.1 (H) 3.5 - 11.3 k/uL    RBC 2.59 (L) 3.95 - 5.11 m/uL    Hemoglobin 8.1 (L) 11.9 - 15.1 g/dL    Hematocrit 25.9 (L) 36.3 - 47.1 %    .0 82.6 - 102.9 fL    MCH 31.3 25.2 - 33.5 pg    MCHC 31.3 28.4 - 34.8 g/dL    RDW 15.5 (H) 11.8 - 14.4 % Platelets 894 675 - 154 k/uL    MPV 10.5 8.1 - 13.5 fL    NRBC Automated 0.0 0.0 per 100 WBC    Differential Type NOT REPORTED     Seg Neutrophils 81 (H) 36 - 65 %    Lymphocytes 7 (L) 24 - 43 %    Monocytes 10 3 - 12 %    Eosinophils % 2 1 - 4 %    Basophils 0 0 - 2 %    Immature Granulocytes 0 0 %    Segs Absolute 9.75 (H) 1.50 - 8.10 k/uL    Absolute Lymph # 0.89 (L) 1.10 - 3.70 k/uL    Absolute Mono # 1.19 0.10 - 1.20 k/uL    Absolute Eos # 0.23 0.00 - 0.44 k/uL    Basophils Absolute <0.03 0.00 - 0.20 k/uL    Absolute Immature Granulocyte 0.04 0.00 - 0.30 k/uL    WBC Morphology NOT REPORTED     RBC Morphology ANISOCYTOSIS PRESENT     Platelet Estimate NOT REPORTED    Iron and TIBC    Collection Time: 02/01/20  5:24 AM   Result Value Ref Range    Iron 25 (L) 37 - 145 ug/dL    TIBC 206 (L) 250 - 450 ug/dL    Iron Saturation 12 (L) 20 - 55 %    UIBC 181 112 - 347 ug/dL   Ferritin    Collection Time: 02/01/20  5:24 AM   Result Value Ref Range    Ferritin 146 13 - 150 ug/L   Reticulocytes    Collection Time: 02/01/20  5:24 AM   Result Value Ref Range    Retic % 1.1 0.5 - 1.9 %    Absolute Retic # 0.030 0.030 - 0.080 M/uL    Immature Retic Fract 9.300 2.7 - 18.3 %    Retic Hemoglobin 35.4 28.2 - 35.7 pg   Osmolality    Collection Time: 02/01/20  5:24 AM   Result Value Ref Range    Serum Osmolality 284 275 - 295 mOsm/kg   OSMOLALITY, URINE    Collection Time: 02/01/20 11:04 AM   Result Value Ref Range    Osmolality, Ur 183 80 - 1300 mOsm/kg   SODIUM, URINE, RANDOM    Collection Time: 02/01/20 11:04 AM   Result Value Ref Range    Sodium,Ur <20 mmol/L   PROTEIN, URINE, RANDOM    Collection Time: 02/01/20 11:04 AM   Result Value Ref Range    Total Protein, Urine 5 mg/dL   VITAMIN B12 & FOLATE    Collection Time: 02/01/20 12:38 PM   Result Value Ref Range    Vitamin B-12 374 232 - 1245 pg/mL    Folate 6.1 >4.8 ng/mL       Imaging/Diagonstics:  Xr Shoulder Right (min 2 Views)    Result Date: 1/31/2020  Postsurgical changes from a right reverse total shoulder arthroplasty. Assessment :      Hospital Problems           Last Modified POA    Hyponatremia 2/1/2020 Yes    S/P reverse total shoulder arthroplasty, right 1/31/2020 Yes    Acute on chronic anemia 2/1/2020 Yes    Benign essential HTN 2/1/2020 Yes    Iron deficiency 2/1/2020 Yes    Folate deficiency 2/1/2020 Yes          Plan:     1. Patient is stable and medically cleared for discharge  2. Patient however has a drop in anemia and probably could have benefited from close observation overnight to repeat the labs but she is insisting on leaving today  3. Discharge medication prescriptions placed by myself for ferrous sulfate, Folvite, Senokot-S as needed and ascorbic acid to aid in the absorption of the iron  4. Hospitalist at this point will sign off and we do appreciate your input very much.     Consultations:   Renay Oswald HOSPITALIST  IP CONSULT TO Cyril Cabrera MD  2/1/2020  4:00 PM    Copy sent to Dr. Viviana Vincent MD

## 2020-02-01 NOTE — PROGRESS NOTES
Department of Anesthesiology   Acute Pain Progress Note    Patient's Name: Kary Mullins  Surgeon: No name on file.    Date: 2020    Pt Awake, Alert    Last Pain Score: (Charted in Doc Flowsheet)  Pain Level: 2    Vital Signs (Current)   Vitals:    20 1219   BP: (!) 100/55   Pulse: 62   Resp: 16   Temp: 97.7 °F (36.5 °C)   SpO2: 98%     Vital Signs Statistics (for past 48 hrs)     Temp  Av.7 °F (35.4 °C)  Min: 94.5 °F (34.7 °C)   Min taken time: 20 1614  Max: 98.2 °F (36.8 °C)   Max taken time: 20 1900  Pulse  Av.6  Min: 64   Min taken time: 20 1730  Max: 80   Max taken time: 20 0955  Resp  Av.4  Min: 0   Min taken time: 20 1641  Max: 24   Max taken time: 20 1700  BP  Min: 54/40   Min taken time: 20 1440  Max: 165/81   Max taken time: 20 1152  MAP (mmHg)  Av.1  Min: 29   Min taken time: 20 1830  Max: 138   Max taken time: 20 0950  SpO2  Av.3 %  Min: 80 %   Min taken time: 20 1635  Max: 100 %   Max taken time: 20 1639    BP Readings from Last 3 Encounters:   20 (!) 100/55   20 112/82   20 103/71       Allergies   Allergen Reactions    No Known Allergies      Patient Active Problem List   Diagnosis    Open fracture of left ankle    Hyponatremia    Osteoarthrosis involving lower leg    Primary osteoarthritis of right knee    Acute meniscal tear, medial    Primary osteoarthritis of both knees    Rotator cuff arthropathy of right shoulder    S/P reverse total shoulder arthroplasty, right    Acute on chronic anemia    Benign essential HTN       Current Medications  bupivacaine 0.125% (MARCAINE) in sodium chloride 0.9% infusion 500 mL, Continuous  furosemide (LASIX) tablet 20 mg, Daily  lisinopril (PRINIVIL;ZESTRIL) tablet 2.5 mg, Daily  sodium chloride flush 0.9 % injection 10 mL, 2 times per day  sodium chloride flush 0.9 % injection 10 mL, PRN  ondansetron (ZOFRAN) injection 4 mg, Q6H PRN  acetaminophen (TYLENOL) tablet 1,000 mg, Q6H  gabapentin (NEURONTIN) tablet 300 mg, Nightly  ketorolac (TORADOL) injection 15 mg, Q6H  oxyCODONE (ROXICODONE) immediate release tablet 10 mg, Q4H PRN  oxyCODONE (ROXICODONE) immediate release tablet 5 mg, Q4H PRN  lactated ringers infusion, Continuous  docusate sodium (COLACE) capsule 100 mg, BID  senna (SENOKOT) tablet 8.6 mg, Daily PRN  fleet rectal enema 1 enema, Daily PRN  aspirin chewable tablet 81 mg, Daily        PCA Flow Sheet                       Labs  CBC:   Lab Results   Component Value Date    WBC 12.1 02/01/2020    RBC 2.59 02/01/2020    HGB 8.1 02/01/2020    HCT 25.9 02/01/2020    .0 02/01/2020    RDW 15.5 02/01/2020     02/01/2020     CMP:    Lab Results   Component Value Date     02/01/2020    K 4.6 02/01/2020     02/01/2020    CO2 20 02/01/2020    BUN 13 02/01/2020    CREATININE 0.87 02/01/2020    GFRAA >60 02/01/2020    LABGLOM >60 02/01/2020    GLUCOSE 115 02/01/2020    GLUCOSE 88 06/28/2017    PROT 7.8 01/17/2020    CALCIUM 8.8 02/01/2020    BILITOT 0.31 01/17/2020    ALKPHOS 103 01/17/2020    AST 18 01/17/2020    ALT 11 01/17/2020     BMP:    Lab Results   Component Value Date     02/01/2020    K 4.6 02/01/2020     02/01/2020    CO2 20 02/01/2020    BUN 13 02/01/2020    CREATININE 0.87 02/01/2020    CALCIUM 8.8 02/01/2020    GFRAA >60 02/01/2020    LABGLOM >60 02/01/2020    GLUCOSE 115 02/01/2020    GLUCOSE 88 06/28/2017     COAGS:    Lab Results   Component Value Date    PROTIME 10.8 08/12/2014    INR 1.0 08/12/2014    APTT 53.5 08/12/2014         A/P: Patricia Ceron is a 69y.o. year-old s/p shoulder now POD#2 with good pain control   Treatment Plan:   Doing well with very good pain control with motor weakness, not safe to go home at this point. Stop infusion and assess motor later.      -Thank you for opportunity to participate in this patient's care.     Electronically signed by Chantal Lu,

## 2020-02-01 NOTE — PROGRESS NOTES
Occupational Therapy   Occupational Therapy Initial Assessment  Date: 2020   Patient Name: Madelin Gallardo  MRN: [id number]     : 1945    Date of Service: 2020    Discharge Recommendations:  Patient would benefit from continued therapy after discharge     Pt unsafe to return to prior home setup at this time. Pt requiring high levels of physical assistance throughout ADLs and functional transfers/functional mobility as well as constant redirection and VCs to maintain RUE restrictions and to incorporate good safety awareness. Assessment   Performance deficits / Impairments: Decreased functional mobility ; Decreased ADL status; Decreased safe awareness;Decreased balance;Decreased endurance;Decreased cognition;Decreased sensation;Decreased high-level IADLs;Decreased fine motor control;Decreased ROM; Decreased strength  Prognosis: Good  Decision Making: Medium Complexity  OT Education: OT Role;Plan of Care;Precautions; ADL Adaptive Strategies;Transfer Training;Equipment;Orientation  REQUIRES OT FOLLOW UP: Yes  Activity Tolerance  Activity Tolerance: Patient limited by fatigue;Treatment limited secondary to decreased cognition;Patient limited by pain  Safety Devices  Safety Devices in place: Yes  Type of devices: Call light within reach; Chair alarm in place;Nurse notified; Left in chair  Restraints  Initially in place: No           Patient Diagnosis(es): There were no encounter diagnoses. has a past medical history of Arthritis, Basal cell carcinoma (BCC) of left forearm, Chronic back pain, Depression, Eczema, Hypertension, Hyponatremia, Iron deficiency anemia, Knee pain, right, PONV (postoperative nausea and vomiting), Rotator cuff disorder, Snores, and Wears glasses. has a past surgical history that includes Ankle surgery (Left, 14); Ankle arthroscopy (Left, 2015); Dilation and curettage of uterus; Skin cancer excision (Left, 2017); eye surgery (Bilateral, 2017);  Total knee Wheelchair-manual(pt reports using cane on uneven surfaces- keeps in car)  ADL Assistance: [address]: Independent  Homemaking Responsibilities: Yes  Ambulation Assistance: Independent  Transfer Assistance: Independent  Active : Yes  Mode of Transportation: SUV  Occupation: Retired  Type of occupation: Realtor  Leisure & Hobbies: Enjoys cooking and sewing  Additional Comments: Pt reports family is nearby and able to assist prn. Social/Functional information provided by pt report, however pt questionable historian and provides contradicting information throughout. Objective   Vision: Impaired  Vision Exceptions: Wears glasses for reading  Hearing: Within functional limits         Balance  Sitting Balance: Contact guard assistance  Standing Balance: Moderate assistance    Functional Mobility  Functional - Mobility Device: No device  Activity: To/from bathroom  Assist Level: Moderate assistance  Functional Mobility Comments: Pt significantly unsteady with 3x anterior LOB requiring mod A to correct. Pt declined use of AD however reaching out for IV pole, furniture, writer's hand, and walls throughout. Toilet Transfers  Toilet - Technique: Ambulating  Equipment Used: Grab bars(L sided)  Toilet Transfer: Minimal assistance     ADL  Feeding: Increased time to complete;Setup  Grooming: Increased time to complete;Minimal assistance;Verbal cueing  UE Bathing: Increased time to complete; Moderate assistance;Verbal cueing  LE Bathing: Increased time to complete; Moderate assistance;Verbal cueing  UE Dressing: Increased time to complete; Moderate assistance;Verbal cueing  LE Dressing: Increased time to complete; Moderate assistance;Verbal cueing  Toileting: Increased time to complete; Moderate assistance           Bed mobility  Supine to Sit: Contact guard assistance(with bed flattened and no use of bed rail to mimic home setup)  Sit to Supine: (Pt retired up to recliner chair)  Scooting:

## 2020-02-01 NOTE — PROGRESS NOTES
Dr. Hellen Sam would like patient to go home today if numbness from nerve block improves and motor function improves. Will continue to monitor.

## 2020-02-01 NOTE — PROGRESS NOTES
Incentive spirometer education given to patient. Patient demonstrated appropriately and verbally understands importance.

## 2020-02-01 NOTE — DISCHARGE INSTR - COC
Continuity of Care Form    Patient Name: Chato Martin   :  1945  MRN:  [id number]    Admit date:  2020  Discharge date:  2020    Code Status Order: Full Code   Advance Directives:   [facility] Documentation     Date/Time Healthcare Directive Type of Healthcare Directive Copy in [facility] [address] Agent's Name Healthcare Agent's Phone Number    20 0845  No, patient does not have an advance directive for healthcare treatment -- -- -- -- --          Admitting Physician:  Nicole Bashir MD  PCP: Brittany Lam MD    Discharging Nurse: Yahaira Pisano 23 Unit/Room#: 6326/3501-20  Discharging Unit Phone Number: [phone]    Emergency Contact:   Extended Emergency Contact Information  Primary Emergency Contact: [address] Phone: [phone]  Work Phone: [phone]  Mobile Phone: [phone]  Relation: Child  Secondary Emergency Contact: [address] of [facility] Phone: [phone]  Work Phone: [phone]  Mobile Phone: [phone]  Relation: Brother/Sister    Past Surgical History:  Past Surgical History:   Procedure Laterality Date    ANKLE ARTHROSCOPY Left 2015    ANKLE SURGERY Left 14    open orif    CLOSED TX ULNAR FRACTURE PROX END W MANIPULATION Right 8/10/2018    CLOSED REDUCTION PERC. PINNING RIGHT DISTAL RADIUS  (K-WIRES) performed by Nicole Bashir MD at [address] [place] [address]      x2    EYE SURGERY Bilateral 2017    cataract with lens implants    SHOULDER ARTHROPLASTY Right 2020    REVERSE TOTAL SHOULDER ARTHROPLASTY     SKIN CANCER EXCISION Left 2017    2 months ago    TOTAL KNEE ARTHROPLASTY Right 2017    KNEE TOTAL ARTHROPLASTY - Aretha  performed by Nicole Bashir MD at [place] [address]       Immunization History: There is no immunization history on file for this patient.     Active Problems:  Patient Active Problem List   Diagnosis Code  Open fracture of left ankle S82.892B    Hyponatremia E87.1    Osteoarthrosis involving lower leg M17.10    Primary osteoarthritis of right knee M17.11    Acute meniscal tear, medial S83.249A    Primary osteoarthritis of both knees M17.0    Rotator cuff arthropathy of right shoulder M12.811    S/P reverse total shoulder arthroplasty, right Z96.611    Acute on chronic anemia D64.9    Benign essential HTN I10       Isolation/Infection:   Isolation          No Isolation        Patient Infection Status     None to display          Nurse Assessment:  Last Vital Signs: BP (!) 100/55   Pulse 62   Temp 97.7 °F (36.5 °C) (Oral)   Resp 16   Ht 5' 4\" (1.626 m)   Wt 173 lb (78.5 kg)   SpO2 98%   BMI 29.70 kg/m²     Last documented pain score (0-10 scale): Pain Level: 5  Last Weight:   Wt Readings from Last 1 Encounters:   01/31/20 173 lb (78.5 kg)     Mental Status:  oriented and alert    IV Access:  - None    Nursing Mobility/ADLs:  Walking   Independent  Transfer  Independent  Bathing  Independent  Dressing  Independent  Toileting  Independent  Feeding  Independent  Med Admin  Independent  Med Delivery   whole    Wound Care Documentation and Therapy:        Elimination:  Continence:   · Bowel: Yes  · Bladder: Yes  Urinary Catheter: None   Colostomy/Ileostomy/Ileal Conduit: No       Date of Last BM:     Intake/Output Summary (Last 24 hours) at 2/1/2020 1457  Last data filed at 2/1/2020 1047  Gross per 24 hour   Intake 550 ml   Output 1640 ml   Net -1090 ml     I/O last 3 completed shifts: In: 2000 [I.V.:2000]  Out: 1100 [Urine:900; Blood:200]    Safety Concerns:      At Risk for Falls    Impairments/Disabilities:      None    Nutrition Therapy:  Current Nutrition Therapy:   - Oral Diet:  General    Routes of Feeding: Oral  Liquids: No Restrictions  Daily Fluid Restriction: no  Last Modified Barium Swallow with Video (Video Swallowing Test): not done    Treatments at the Time of Hospital Discharge:

## 2020-02-03 NOTE — ANESTHESIA POSTPROCEDURE EVALUATION
Department of Anesthesiology  Postprocedure Note    Patient: Tiffany Salas  MRN: [id number]  YOB: 1945  Date of evaluation: 2/3/2020  Time:  9:38 AM     Procedure Summary     Date:  01/31/20 Room / Location:  [address]    Anesthesia Start:  1147 Anesthesia Stop:  5076    Procedure:  REVERSE TOTAL SHOULDER ARTHROPLASTY (Right ) Diagnosis:  (ROTATOR CUFF ARTHROPATHY RIGHT SHOULDER)    Surgeon:  Felicity Toth MD Responsible Provider:  Abrahan Dickson MD    Anesthesia Type:  general, regional ASA Status:  3          Anesthesia Type: general, regional    Ilir Phase I: Ilir Score: 9    Ilir Phase II:      Last vitals: Reviewed and per EMR flowsheets.    POST-OP ANESTHESIA NOTE       BP (!) 100/55   Pulse 62   Temp 97.7 °F (36.5 °C) (Oral)   Resp 16   Ht 5' 4\" (1.626 m)   Wt 173 lb (78.5 kg)   SpO2 98%   BMI 29.70 kg/m²    Pain Assessment: 0-10  Pain Level: 5           Anesthesia Post Evaluation    Patient location during evaluation: PACU  Patient participation: complete - patient participated  Level of consciousness: awake  Pain score: 5  Airway patency: patent  Nausea & Vomiting: no vomiting and no nausea  Complications: no  Cardiovascular status: hemodynamically stable  Respiratory status: acceptable  Hydration status: stable

## 2020-02-05 NOTE — OP NOTE
Patient: Jeet Syed  YOB: 1945  MRN: [id number]  Date of Procedure: 1/31/2020     Pre-Op Diagnosis: ROTATOR CUFF ARTHROPATHY RIGHT SHOULDER     Post-Op Diagnosis: Same       Procedure(s):  REVERSE TOTAL SHOULDER ARTHROPLASTY, RIGHT     Anesthesia: Regional, General     Surgeon(s):  Zohaib Gomez MD     Assistant(s): Kd, DO PGY-4; Olman Mcnulty, DO PGY-2     Estimated Blood Loss (mL): 200      Fluids: 7136 mL     Complications: none     Specimens:   * No specimens in log *     Implants:  Implant Name Type Inv. Item Serial No.  Lot No. LRB No. Used   GLENOSPHERE MINI BASEPLATE 98FE Shoulder/Arm/Wrist/Hand GLENOSPHERE MINI BASEPLATE 96ZP   [employer] [id number] Right 1   SCREW CENT REV SHOULDER COMP 6.5X35MM Shoulder/Arm/Wrist/Hand SCREW CENT REV SHOULDER COMP 6.5X35MM   [employer] [id number] Right 1   SCREW NON-LK 4.05E92KT Screw/Plate/Nail/Isrrael SCREW NON-LK 4.04J17WX   [employer] [id number] Right 1   SCREW NON-LK 4.99U76FT Screw/Plate/Nail/Isrrael SCREW NON-LK 4.08C94CJ   [employer] [id number] Right 1   SCREW LK 4.93E48HZ Screw/Plate/Nail/Isrrael SCREW LK 4.58S79XG   [employer] [id number] Right 1   SCREW LK 4.23T25AN Screw/Plate/Nail/Isrrael SCREW LK 4.66M65PM   [employer] [id number] Right 1   COMPONENT SHOULDER REVERS GLNSP 36MM Shoulder/Arm/Wrist/Hand COMPONENT SHOULDER REVERS GLNSP 36MM   [employer] [id number] Right 1   IMPL SHOULDER HUMERAL TRAY 36MM STD Shoulder/Arm/Wrist/Hand IMPL SHOULDER HUMERAL TRAY 36MM STD   [employer] [id number] Right 1   IMPL SHOULDER HUMERAL TRAY STD +5 Shoulder/Arm/Wrist/Hand IMPL SHOULDER HUMERAL TRAY STD +5   [employer] [id number] Right 1   IMPL SHOULDER STEM W ALIGN HOLE [id number] Shoulder/Arm/Wrist/Hand IMPL SHOULDER STEM W ALIGN HOLE [id number]   [employer] [id number] Right 1      Drains: * No LDAs found *    Operative Indications:  Patient is a 27-year-old  female with history of severe right shoulder pain from cuff arthropathy.  She is tried multiple nonoperative interventions in the past including activity modification, physical therapy, anti-inflammatories, and steroid injections with minimal amounts of long-term relief. Given the fact that the patient has had a lack of significant relief with nonoperative interventions as well as significant cuff arthropathy changes now affecting activities of daily living and quality of life risks and benefits of surgical intervention with reverse total shoulder arthroplasty were discussed at great detail and she expresses understanding and agreement to proceed with surgical intervention. She now presents to hospital for right reverse total shoulder arthroplasty. Operative Summary:  Patient was met in the preoperative holding area at which time history and physical was reviewed, informed consent was obtained, and operative site was marked. She was then transported from the preoperative holding area to the operating theater and laid in the supine position on the operating table. General anesthesia endotracheal intubation was then performed without difficulty by the anesthesia team. Patient was then positioned in a modified beachchair position. All bony prominences were properly padded and the patient's head and neck were placed in a neutral position. Right upper extremity was then prepped and draped in the standard sterile fashion. At this time formal timeout procedure was then performed agreed upon by all personnel present in the room confirming correct patient, operative site, and procedure. Ancef 2 grams is administered intravenously for surgical prophylaxis as well as 1 g of TXA for help with intraoperative hemostasis. An approximately 12 cm deltopectoral incision was made. The cephalic vein was identified and protected medially. The subdeltoid, subcoracoid and subacromial spaces were developed and released sequentially. There was a full-thickness tear of the supraspinatus with significant attenuation of the tendon.  The axillary nerve was identified and protected. The biceps was found to already being torn proximally about the supraglenoid tubercle and no tendon was identified within the bicipital groove. The subscapularis was then released off of the lesser tuberosity, and the humeral head was atraumatically dislocated. Hypertrophic osteophytes were removed from the proximal humerus. An osteotomy of the remaining humeral head was made in 30 degrees of retroversion using the appropriate cutting jig. Attention was then directed to the glenoid. The axillary nerve was identified and protected. The subscapularis and anterior capsule were released. A 360 degree vinay-glenoid release was then performed. There was significant glenoid wear with Milch B2 pattern. The position for the centering hole was identified along the inferior half of the glenoid using the anatomic center line and pin placed. Guidewire was then centered into position and sequential cannulated glenoid reamers were then used to prepare the glenoid, obtaining 100% coverage. The baseplate was then inserted to achieve maximum compression on the glenoid and center screw placed, 6.5 x 35 mm. Finally, 4 additional screws were placed after appropriate  holes measuring 4.75 x 25 mm (two non-locking), 4.75 x 15 mm locking, and 4.75 x 20 mm locking. The periphery of the baseplate was co-planed and potential impingement points resected. A 36 mm glenosphere component was then impacted onto the baseplate rodas taper. The rodas taper was then tested and ensured to be properly engaged. Attention was then focused back to the proximal humerus. The humeral canal was then sequentially reamed up to 11 mm with good cortical contact. The canal was then sequentially broached by hand starting with 9 mm up to 11 with good cortical fit and stability. Size 11 humeral stem was then impacted in 30 degrees of retroversion obtaining an excellent press fit.   A 36 mm standard trial liner was inserted, the shoulder reduced and taken through a range of motion demonstrating forward elevation of 140, 40 of external rotation, and appropriate stability. Mild instability with tug test. The trial was removed and final 36 mm + 5 tray and standard 36 mm polyethylene liner was impacted on to the stem and the shoulder was reduced. The subscapularis was not repaired due to poor quality tissue and to avoid any lack of external rotation post-operatively. The shoulder was stable. The axillary nerve was examined and was noted to be intact and move freely without tension. Copious amounts of normal saline followed by dilute betadine was used to irrigate the shoulder. The deltopectoral interval was closed loosely with 0 vicryl. Subcutaneous closure with 2-0 Vicryl. The skin was closed with a 3-0 Monocryl in a running subcuticular fashion. Sterile Aquacell dressing was then applie. The patient's arm was placed in a shoulder immobilizer. She was awoken and reversed from general anesthesia without difficulty by the anesthesia team. She was transferred to the post-anesthesia care unit in a stable condition without complications. Dr. Varun Pedraza was present and actively involved during the entire surgical treatment of this patient.

## 2020-02-13 ENCOUNTER — HOSPITAL ENCOUNTER (OUTPATIENT)
Age: 75
Discharge: HOME | End: 2020-02-15
Payer: MEDICARE

## 2020-02-13 ENCOUNTER — HOSPITAL ENCOUNTER (OUTPATIENT)
Dept: GENERAL RADIOLOGY | Age: 75
Discharge: HOME | End: 2020-02-15
Payer: MEDICARE

## 2020-02-13 ENCOUNTER — OFFICE VISIT (OUTPATIENT)
Dept: ORTHOPEDIC SURGERY | Age: 75
End: 2020-02-13

## 2020-02-13 VITALS — WEIGHT: 173 LBS | BODY MASS INDEX: 29.53 KG/M2 | HEIGHT: 64 IN

## 2020-02-13 PROCEDURE — 73030 X-RAY EXAM OF SHOULDER: CPT

## 2020-02-13 PROCEDURE — 99024 POSTOP FOLLOW-UP VISIT: CPT | Performed by: ORTHOPAEDIC SURGERY

## 2020-02-13 RX ORDER — OXYCODONE HYDROCHLORIDE AND ACETAMINOPHEN 5; 325 MG/1; MG/1
1-2 TABLET ORAL EVERY 6 HOURS PRN
Qty: 56 TABLET | Refills: 0 | Status: SHIPPED | OUTPATIENT
Start: 2020-02-13 | End: 2020-02-20

## 2020-02-21 ENCOUNTER — HOSPITAL ENCOUNTER (OUTPATIENT)
Dept: PHYSICAL THERAPY | Facility: CLINIC | Age: 75
Setting detail: THERAPIES SERIES
Discharge: HOME | End: 2020-02-21
Payer: MEDICARE

## 2020-02-21 PROCEDURE — 97161 PT EVAL LOW COMPLEX 20 MIN: CPT

## 2020-02-21 PROCEDURE — 97110 THERAPEUTIC EXERCISES: CPT

## 2020-02-21 PROCEDURE — 97016 VASOPNEUMATIC DEVICE THERAPY: CPT

## 2020-02-21 NOTE — PLAN OF CARE
[] [facility]        Outpatient Physical                Therapy       [address]       Phone: [phone]       Fax: [phone] [x] [facility]       Promotion at [address] [facility]       Phone: [phone]       Fax: [phone] [] [facility]      for [facility] Promotion    [address] [facility]     Phone: [phone]     Fax:  [phone]     Physical Therapy Plan of Care    Date:  2020  Patient: Brian Funes  : 1945  MRN: [id number]  Physician: Dacia Weir MD                            Insurance: MEDICARE,  PT ONLY  Medical Diagnosis: X84.619 (ICD-10-CM) - S/P reverse total shoulder arthroplasty, right      Rehab Codes: M25.511, M25.611, M62.511  Onset Date: DOS 2020              Next 's appt: 3/12/2020    Subjective:   CC: status post right reverse total shoulder arthroplasty on 2020      HPI: Pt is 3 weeks post op right reverse total shoulder arthroplasty. Pt presents to evaluation with no sling donned; states that Dr. Marisela Kurtz released her from sling wear and gave her permission to drive. Pt states she was not given any specific restrictions on activity or lifting (Per Dr. Cam Chandler office, no lifting > 8 lbs). Pt states that incision is healing well, although she feels like \"the skin is getting tight\". Pt states she minimally uses the RUE for ADL's; can brush her teeth/drink using the RUE with support from the left hand. Has to use LUE for hair brushing/washing/anything overhead. Difficulty donning/doffing clothing. Pt is still taking medication to manage the pain. Assessment:  Patient is a pleasant 69year old female who presents to clinic status post uncomplicated right reverse total shoulder arthroplasty. Pt's current limitations include decreased ROM, R shoulder weakness, and moderate pain levels that have impaired her functional mobility.  Pt has currently ended wear of sling and

## 2020-02-21 NOTE — CONSULTS
[] [facility]) [facility] &  Therapy  [address]  [phone]  F: [phone] [x] [address]  [place] [address]   [address]  P: [phone]  F: [phone] [] [address]  Therapy  [address] [facility]  P: [phone]  F: [phone] [] [address]  [facility]   [address]   [place]: [phone]  F: [phone]      Physical Therapy Upper Extremity Evaluation    Date:  2020  Patient: Jose Blackmon  : 1945  MRN: [id number]  Physician: Miles Spann MD   Insurance: MEDICARE,  PT ONLY  Medical Diagnosis: D69.071 (ICD-10-CM) - S/P reverse total shoulder arthroplasty, right   Rehab Codes: M25.511, M25.611, M62.511  Onset Date: DOS 2020   Next 's appt: 3/12/2020    Subjective:   CC: status post right reverse total shoulder arthroplasty on 2020     HPI: Pt is 3 weeks post op right reverse total shoulder arthroplasty. Pt presents to evaluation with no sling donned; states that Dr. Benard Schilder released her from sling wear and gave her permission to drive. Pt states she was not given any specific restrictions on activity or lifting (Per Dr. Brando Arzate office, no lifting > 8 lbs). Pt states that incision is healing well, although she feels like \"the skin is getting tight\". Pt states she minimally uses the RUE for ADL's; can brush her teeth and drink using the RUE with support from the left hand. Has to use LUE for hair brushing/washing/anything overhead. Difficulty donning/doffing clothing. Pt is still taking medication to manage the pain.      PMHx: [x] HTN       [x] Cancer-basal cell [x] Arthritis [x] Other: currently smoker              [x] Refer to full medical chart  In EPIC     Comorbidities:   [] Obesity [] Dialysis  [x] Other: smoker    [] Asthma/COPD [] Dementia [] Other:   [] Stroke [] Sleep apnea [] Other:   [] Vascular disease [] Rheumatic disease [] Other:     Tests: [x] X-Ray:  Impression   1. Uncomplicated right reverse shoulder arthroplasty. 2. Mild osteoarthritic changes of the right acromioclavicular joint. 3. Bony demineralization. Medications: [x] Refer to full medical record   Allergies:  [x] None    Function:  Hand Dominance  [] Right  [] Left  Patient lives with:  lives alone    In what type of home [x]  One story   [] Two story   [] Split level   Number of stairs to enter 3 YONATHAN   With handrail on the [x]  Right to enter   [x] Left to enter   Bathroom has a []  Tub only  [x] Tub/shower combo   [] Walk in shower    [x]  Grab bars   Washing machine is on []  Main level   [] Second level   [] Basement   Employer    Job Status []  Normal duty   [] Light duty   [] Off due to condition    [x]  Retired   [] Not employed   [] Disability  [] Other:  []  Return to work:    Work activities/duties  cooking, sewing- cannot do either right now        ADL/IADL Previous level of function Current level of function Who currently assists the patient with task   Bathing  [] Independent  [] Assist [x] Independent  [] Assist    Dress/grooming [] Independent  [] Assist [x] Independent  [] Assist    Transfer/mobility [] Independent  [] Assist [] Independent  [] Assist    Feeding [] Independent  [] Assist [] Independent  [] Assist    Toileting [] Independent  [] Assist [] Independent  [] Assist    Driving [] Independent  [] Assist [] Independent  [x] Assist Sister and brother in law   Housekeeping [] Independent  [] Assist [] Independent  [x] Assist    Grocery shop/meal prep [] Independent  [] Assist [x] Independent  [] Assist Able to microwave things herself, can make easy things     Gait Prior level of function Current level of function    [x] Independent  [] Assist [x] Independent  [] Assist   Device: [] Independent [x] Independent    [] Straight Cane [] Quad cane [] Straight Cane [] Demonstrates/verbalizes understanding of HEP/Ed previously given. Treatment Plan:  [x] Therapeutic Exercise   01247  [] Iontophoresis: 4 mg/mL Dexamethasone Sodium Phosphate  mAmin  36815   [x] Therapeutic Activity  70231 [x] Vasopneumatic cold with compression  28803    [] Gait Training   65764 [] Ultrasound   84610   [x] Neuromuscular Re-education  07951 [] Electrical Stimulation Unattended  56714   [x] Manual Therapy  48701 [] Electrical Stimulation Attended  31994   [x] Instruction in HEP  [] Lumbar/Cervical Traction  34588   [] Aquatic Therapy   51117 [x] Cold/hotpack    [] Massage   84302      [] Dry Needling, 1 or 2 muscles  24557   [] Biofeedback, first 15 minutes   01323  [] Biofeedback, additional 15 minutes   36750 [] Dry Needling, 3 or more muscles  27822     []  Medication allergies reviewed for use of    Dexamethasone Sodium Phosphate 4mg/ml     with iontophoresis treatments. Pt is not allergic. Frequency: 2 x/week for 12 visits    Todays Treatment:  Modalities: vasocompression to R shoulder x 10 minutes, seated, min pressure +  Precautions: avoid excessive IR, shld hor add, extension. No lifting >8 lbs. See protocol in hard chart   Exercises:  Exercise Reps/ Time Weight/ Level Comments         Scapular pinches 15x5\"           Shoulder isometrics 15x5\"  Flex, abd, ext (ext NOT past neutral)                     Other: issued pendulums,Pronation/supination, and Elbow flex/ext exercises for home completion. Educated patient on proper sleep position for optimal shoulder positioning     Specific Instructions for next treatment: PROM with progression to AAROM as tolerated. Gentle rhythmic stab as tolerated. Elbow/wrist strengthening.  Cryotherapy       Evaluation Complexity:  History (Personal factors, comorbidities) [] 0 [] 1-2 [x] 3+   Exam (limitations, restrictions) [] 1-2 [x] 3 [] 4+   Clinical presentation (progression) [x] Stable [] Evolving  [] Unstable   Decision Making [x] Low [] Moderate [] High    [x] Low Complexity [] Moderate Complexity [] High Complexity       Treatment Charges: Mins Units   [x] Evaluation       [x]  Low       []  Moderate       []  High 30 1   []  Modalities     [x]  Ther Exercise 10 1   []  Manual Therapy     []  Ther Activities     []  Aquatics     [x]  Vasocompression 15 1   []  Other     Estimated cost of care to date (02/21/20): $115.40    TOTAL TREATMENT TIME: 55 minutes     Time in: 1:30 pm    Time Out: 2:30 pm    Electronically signed by: Verena Hebert PT        Physician Signature:________________________________Date:__________________  By signing above or cosigning this note, I have reviewed this plan of care and certify a need for medically necessary rehabilitation services.      *PLEASE SIGN ABOVE AND FAX BACK ALL PAGES*

## 2020-02-24 ENCOUNTER — APPOINTMENT (OUTPATIENT)
Dept: PHYSICAL THERAPY | Facility: CLINIC | Age: 75
End: 2020-02-24
Payer: MEDICARE

## 2020-02-24 ENCOUNTER — HOSPITAL ENCOUNTER (OUTPATIENT)
Dept: PHYSICAL THERAPY | Facility: CLINIC | Age: 75
Setting detail: THERAPIES SERIES
Discharge: HOME | End: 2020-02-24
Payer: MEDICARE

## 2020-02-24 PROCEDURE — 97110 THERAPEUTIC EXERCISES: CPT

## 2020-02-24 PROCEDURE — 97016 VASOPNEUMATIC DEVICE THERAPY: CPT

## 2020-02-27 ENCOUNTER — HOSPITAL ENCOUNTER (OUTPATIENT)
Dept: PHYSICAL THERAPY | Facility: CLINIC | Age: 75
Setting detail: THERAPIES SERIES
Discharge: HOME | End: 2020-02-27
Payer: MEDICARE

## 2020-02-27 PROCEDURE — 97016 VASOPNEUMATIC DEVICE THERAPY: CPT

## 2020-02-27 PROCEDURE — 97110 THERAPEUTIC EXERCISES: CPT

## 2020-02-27 NOTE — FLOWSHEET NOTE
[] [facility] &  Therapy  [address]  [phone]  F: [phone] [x] [address]  [facility]   [address]  P: [phone]  F: [phone] [] [address]  Therapy  [address]  P: [phone]  F: [phone] [] [address]  [facility]   [address]   [place]: [phone]  F: [phone]      Physical Therapy Daily Treatment Note    Date:  2020  Patient Name:  Gladys Jennings    :  1945  MRN: [id number]  Physician: Nlisa Weber MD                             Insurance: MEDICARE,  PT ONLY  Medical Diagnosis: T37.324 (ICD-10-CM) - S/P reverse total shoulder arthroplasty, right      Rehab Codes: M25.511, M25.611, M62.511  Onset Date: DOS 2020               Next 's appt: 3/12/2020  Visit# / total visits: 3/12; Progress note for Medicare patient due at visit 6     Cancels/No Shows: 0/0    Subjective:    Pain:  [x] Yes  [] No Location: R shoulder  Pain Rating: (0-10 scale) 4/10 with use  Pain altered Tx:  [x] No  [] Yes  Action:  Comments: Pt reports she \"over-did it\" cooking/baking the past 2 days, therefore, she has not been able to do HEP. Pt reports she is taking percocet TID. Pt notes it feels good to have shoulder mobilized. Objective:  Modalities: vasocompression to R shoulder x 15 minutes, seated, min pressure   Precautions: avoid excessive IR, shld hor add, extension. No lifting >8 lbs.   See protocol in hard chart   Exercises: R shoulder   Exercise Reps/ Time Weight/ Level Comments   PROM 20 min   flexion, abduction, ER in scapular plane   Scapular pinches 15x5\"       Shoulder shrugs 15x5\"  Added    Shoulder isometrics 15x5\"   Flex, abd, ext (ext NOT past neutral)             Bicep curls 15x 1#     Forearm pronation/supination 15x 1# Added 2/27; elbow supported   Wrist ext/flex/RD/UD 15x 1# Added 2/27; elbow supported (UD in horizontal plane)    Other:edu: importance of HEP, scar tissue massage, proper posture & form with ex    Treatment Charges: Mins Units   []  Modalities     [x]  Ther Exercise 40 3   []  Manual Therapy     []  Ther Activities     []  Aquatics     [x]  Vasocompression 15 1   []  Other     Total Treatment time 55 4   Estimated cost of care to date (02/27/20): $263.22    Assessment: [x] Progressing toward goals. Pt with good tolerance to progression of charted exercises, specifically to target forearm, wrist & scapular strengthening. Pt is very pleasant & talkative throughout tx, but required increased cueing for redirection to task at hand. Pt also requires inc verbal & tactile cueing to decreased R UT compensation. & for correct posture & form with ex. Continued PROM this date with better tolerance. Pt was initially guarded, but that quickly subsided with oscillations performed to R shoulder. Pt's greatest discomfort was noted during abduction PROM at [facility]. Pt educated on scar tissue massage once incision is fully healed. Also educated pt on the importance of HEP & to not \"over-do\" any activities, instructed pt to take breaks in between cooking tasks to reduce soreness; pt agreeable. Ended with vasocompression for edema and pain control. [] No change. [] Other:   [x] Patient would continue to benefit from skilled physical therapy services in order to restore ROM, improve shoulder strength, and reduce pain levels for optimal functional mobility and return to prior level of functioning. Problems:    [x]? ? R shoulder Pain: 1-2/10 pain at rest, 4/10 with activity   [x]? ? ROM: limited R shoulder ROM in all planes   [x]? ? Strength:  [x]? ? Function: reports 61% impairment on UEFS  [x]?  Other: difficulty brushing/washing hair, donning/doffing clothes     STG: (to be met in 6 treatments)  1. ? Pain: pt will report <4/10 right

## 2020-03-02 ENCOUNTER — HOSPITAL ENCOUNTER (OUTPATIENT)
Dept: PHYSICAL THERAPY | Facility: CLINIC | Age: 75
Setting detail: THERAPIES SERIES
Discharge: HOME | End: 2020-03-02
Payer: MEDICARE

## 2020-03-02 PROCEDURE — 97016 VASOPNEUMATIC DEVICE THERAPY: CPT

## 2020-03-02 PROCEDURE — 97110 THERAPEUTIC EXERCISES: CPT

## 2020-03-02 NOTE — FLOWSHEET NOTE
[] [facility]) [facility] &  Therapy  [address]  [phone]  F: [phone] [x] [address]  [facility]   [address]  P: [phone]  F: [phone] [] [place]  [address] [facility]  P: [phone]  F: [phone] [] [address]  [facility]   [address]   [place]: [phone]  F: [phone]      Physical Therapy Daily Treatment Note    Date:  3/2/2020  Patient Name:  Bernardino Osorio    :  1945  MRN: [id number]  Physician: Rimma Caceres MD                             Insurance: MEDICARE,  PT ONLY  Medical Diagnosis: X86.405 (ICD-10-CM) - S/P reverse total shoulder arthroplasty, right      Rehab Codes: M25.511, M25.611, M62.511  Onset Date: DOS 2020               Next 's appt: 3/12/2020  Visit# / total visits: ; Progress note for Medicare patient due at visit 6     Cancels/No Shows: 0/0    Subjective:    Pain:  [x] Yes  [] No Location: R shoulder  Pain Rating: (0-10 scale) 4/10 with use  Pain altered Tx:  [x] No  [] Yes  Action:  Comments: Pt states she is feeling better, has been trying to avoid \"overdoing\" it. Has been able to don/doff clothing easier. Objective:  Modalities: vasocompression to R shoulder x 15 minutes, seated, min pressure   Precautions: avoid excessive IR, shld hor add, extension. No lifting >8 lbs.   See protocol in hard chart   Exercises: R shoulder   Exercise Reps/ Time Weight/ Level Comments   PROM 20 min   flexion, abduction, ER in scapular plane   Scapular pinches 15x5\"       Shoulder shrugs 15x5\"  Added    Shoulder isometrics 15x5\"   Flex, abd, ext (ext NOT past neutral)             Bicep curls 15x 1#     Forearm pronation/supination 15x 1# Added ; elbow supported   Wrist ext/flex/RD/UD 15x 1# Added ; elbow supported and wash hair with the RUE  3. Pt will report or demo ability to don/doff sweater without significant pain without assist for improved independence         4. Patient to be independent with home exercise program as demonstrated by performance with correct form without cues. 5.   Demonstrate Knowledge of fall prevention    LTG: (to be met in 12 treatments)  1. Pt will report 2/10 or lower R shoulder pain levels with activity for improved QOL  2. Pt to demo functional and pain free right shoulder ROM for improved ability to complete ADL's   3. Pt will demo grossly 4/5 R shoulder abduction, flexion,and extension strength for improved stability of joint and ability to carry out lifting, pushing, pulling, etc.    Pt. Education:  [x] Yes  [] No  [x] Reviewed Prior HEP/Ed  Method of Education: [x] Verbal (see charted edu)  [x] Demo  [] Written  Comprehension of Education:  [x] Verbalizes understanding. [x] Demonstrates understanding. [] Needs review. [x] Demonstrates/verbalizes HEP/Ed previously given. Plan: [x] Continue for 12 visits toward short and long term goals.   [x] Specific Instructions for subsequent treatments: trial wand ex       Time In: 2:00 pm            Time Out: 3:00 pm    Electronically signed by:  Jama Tapia PT

## 2020-03-05 ENCOUNTER — HOSPITAL ENCOUNTER (OUTPATIENT)
Dept: PHYSICAL THERAPY | Facility: CLINIC | Age: 75
Setting detail: THERAPIES SERIES
Discharge: HOME | End: 2020-03-05
Payer: MEDICARE

## 2020-03-05 PROCEDURE — 97016 VASOPNEUMATIC DEVICE THERAPY: CPT

## 2020-03-05 PROCEDURE — 97110 THERAPEUTIC EXERCISES: CPT

## 2020-03-05 RX ORDER — OXYCODONE HYDROCHLORIDE AND ACETAMINOPHEN 5; 325 MG/1; MG/1
1 TABLET ORAL EVERY 6 HOURS PRN
Qty: 56 TABLET | Refills: 0 | Status: SHIPPED | OUTPATIENT
Start: 2020-03-05 | End: 2020-03-19

## 2020-03-05 NOTE — FLOWSHEET NOTE
[] [facility]) [facility] &  Therapy  [address]  [phone]  F: [phone] [x] [address]  [facility]   [address]  P: [phone]  F: [phone] [] [address]  Therapy  [address]  P: [phone]  F: [phone] [] [address]  [facility]   [address]   [place]: [phone]  F: [phone]      Physical Therapy Daily Treatment Note    Date:  3/5/2020  Patient Name:  Fortunato Spencer    :  1945  MRN: [id number]  Physician: Geovani Guadalupe MD                             Insurance: MEDICARE,  PT ONLY  Medical Diagnosis: F26.119 (ICD-10-CM) - S/P reverse total shoulder arthroplasty, right      Rehab Codes: M25.511, M25.611, M62.511  Onset Date: DOS 2020               Next 's appt: 3/12/2020  Visit# / total visits: ; Progress note for Medicare patient due at visit 6     Cancels/No Shows: 0/0    Subjective:    Pain:  [x] Yes  [] No Location: R shoulder  Pain Rating: (0-10 scale) 4/10 with use  Pain altered Tx:  [x] No  [] Yes  Action:  Comments: Pt states she is sore today, states she may be overusing the shoulder. Objective:  Modalities: vasocompression to R shoulder x 10 minutes, seated, min pressure   Precautions: avoid excessive IR, shld hor add, extension. No lifting >8 lbs.   See protocol in hard chart   Exercises: R shoulder   Exercise Reps/ Time Weight/ Level Comments   PROM 20 min   flexion, abduction, ER in scapular plane   Wand ex: flex, horizontal ab, ER, abduction 10xea 1# Within tolerable range, added 3/5         Scapular pinches 15x5\"  light yellow Band added 3/5   Shoulder shrugs 15x5\" 1# Wt added 3/5   Shoulder isometrics HEP   Flex, abd, ext (ext NOT past neutral)             Bicep curls 15x 1#     Forearm pronation/supination HEP     Wrist ext/flex/RD/UD HEP     Other:edu: importance of HEP, scar tissue massage, proper posture & form with ex    Specifics for next session:  Dynamic stab in supine     Treatment Charges: Mins Units   []  Modalities     [x]  Ther Exercise 45 3   []  Manual Therapy     []  Ther Activities     []  Aquatics     [x]  Vasocompression 10 1   []  Other     Total Treatment time 55 4   Estimated cost of care to date (03/05/20): $421.44    Assessment: [x] Progressing toward goals. Initiated wand exercises for AAROM this date; pt able to complete with proper muscle control and shows gradual improvement in ROM near end of repetitions. Ended with vasocompression for edema and pain control. [] No change. [] Other:   [x] Patient would continue to benefit from skilled physical therapy services in order to restore ROM, improve shoulder strength, and reduce pain levels for optimal functional mobility and return to prior level of functioning. Problems:    [x]? ? R shoulder Pain: 1-2/10 pain at rest, 4/10 with activity   [x]? ? ROM: limited R shoulder ROM in all planes   [x]? ? Strength:  [x]? ? Function: reports 61% impairment on UEFS  [x]? Other: difficulty brushing/washing hair, donning/doffing clothes     STG: (to be met in 6 treatments)  1. ? Pain: pt will report <4/10 right shoulder pain during ROM exercises for improved tolerance to restoration of motion   2. ? ROM: pt to display the following ROM for improved ability to complete overhead tasks:  1. Flexion to 125 °   2. Abduction to 100 °  3. ER to 30°   3. ? Function:   1. Pt to report 9 point improvement on UEFS to indicate improved functional mobility   2. Pt to report ability to touch hair/head with R hand for improved ability to brush and wash hair with the RUE  3. Pt will report or demo ability to don/doff sweater without significant pain without assist for improved independence         4.    Patient to be independent with home exercise program as demonstrated by performance with correct form without cues. 5.   Demonstrate Knowledge of fall prevention    LTG: (to be met in 12 treatments)  1. Pt will report 2/10 or lower R shoulder pain levels with activity for improved QOL  2. Pt to demo functional and pain free right shoulder ROM for improved ability to complete ADL's   3. Pt will demo grossly 4/5 R shoulder abduction, flexion,and extension strength for improved stability of joint and ability to carry out lifting, pushing, pulling, etc.    Pt. Education:  [x] Yes  [] No  [] Reviewed Prior HEP/Ed  Method of Education: [x] Verbal (see charted edu)  [x] Demo  [x] Written: yazmin goode   Comprehension of Education:  [] Verbalizes understanding. [] Demonstrates understanding. [x] Needs review. [] Demonstrates/verbalizes HEP/Ed previously given. Plan: [x] Continue for 7 visits toward short and long term goals.   [x] Specific Instructions for subsequent treatments: dynamic stab in supine        Time In: 2:00 pm            Time Out: 3:00 pm    Electronically signed by:  Marichuy Jones, PT

## 2020-03-09 ENCOUNTER — HOSPITAL ENCOUNTER (OUTPATIENT)
Dept: PHYSICAL THERAPY | Facility: CLINIC | Age: 75
Setting detail: THERAPIES SERIES
Discharge: HOME | End: 2020-03-09
Payer: MEDICARE

## 2020-03-09 PROCEDURE — 97140 MANUAL THERAPY 1/> REGIONS: CPT

## 2020-03-09 PROCEDURE — 97110 THERAPEUTIC EXERCISES: CPT

## 2020-03-09 PROCEDURE — 97016 VASOPNEUMATIC DEVICE THERAPY: CPT

## 2020-03-09 NOTE — FLOWSHEET NOTE
stability 3x20\"  Added 3/9         Scapular pinches 15x5\"  light yellow Band added 3/5   Shoulder shrugs 15x5\" 1# Wt added 3/5   Shoulder isometrics HEP   Flex, abd, ext (ext NOT past neutral)             Bicep curls 15x 1#     Forearm pronation/supination HEP     Wrist ext/flex/RD/UD HEP     Other:      Shoulder ROM 3/9/2020:  Flexion: 130 ° P  Abduction: 108 ° P  ER: 54° P    UEFS; 53/80, 34% impairment     Specifics for next session:  Wall washes flexion/abduction     Treatment Charges: Mins Units   []  Modalities     [x]  Ther Exercise 35 2   [x]  Manual Therapy 15 1   []  Ther Activities     []  Aquatics     [x]  Vasocompression 10 1   []  Other     Total Treatment time 60 4   Estimated cost of care to date (03/09/20): $505.44    Assessment: [x] Progressing toward goals. Pt demonstrates significant improvement in ROM of the right shoulder with less reports of pain since beginning physical therapy (see ROM measurements). Initiated scar mobilization and right upper trap soft tissue mobilization/trigger point release this date as tightness appears to be the main source of the patient's discomfort. Notable improvement in ROM/flexibility noted after manual work. Pt reports 27% improvement on the upper extremity functional scale this date compared to initial evaluation, which demonstrates vast improvement in perceived functional mobility. Will continue to progress as tolerated. [] No change. [] Other:   [x] Patient would continue to benefit from skilled physical therapy services in order to restore ROM, improve shoulder strength, and reduce pain levels for optimal functional mobility and return to prior level of functioning. Problems:    [x]? ? R shoulder Pain: 1-2/10 pain at rest, 4/10 with activity   [x]? ? ROM: limited R shoulder ROM in all planes   [x]? ? Strength:  [x]? ? Function: reports 61% impairment on UEFS  [x]?  Other: difficulty brushing/washing hair, donning/doffing clothes     STG: (to be

## 2020-03-09 NOTE — PROGRESS NOTES
[] [facility]) [facility] &  Therapy  [address]  [phone]  F: [phone] [x] [address]  [facility]   [address]  P: [phone]  F: [phone] [] [address]  [address] [facility]  P: [phone]  F: [phone] [] [address]  [facility]   [address]   [place]: [phone]  F: [phone]      Physical Therapy Progress Note    Date: 3/9/2020      Patient: Jolly Benitez  : 1945  MRN: [id number]    Sayra Le MD                             Insurance: MEDICARE,  PT ONLY  Medical Diagnosis: Z96.611 (ICD-10-CM) - S/P reverse total shoulder arthroplasty, right      Rehab Codes: M25.511, M25.611, M62.511  Onset Date: DOS 2020                 Next 's appt: 3/12/2020  Visit# / total visits: ; Progress note for Medicare patient due at visit 12                        Cancels/No Shows: 0/0  Date range of services: 20 to 3/9/20    Subjective:    Pain:  [x]? Yes  []? No   Location: R shoulder    Pain Rating: (0-10 scale) 1-2/10 with use  Pain altered Tx:  [x]? No  []? Yes  Action:  Comments: Pt states she has been compliant with HEP. Pt presents this date wearing a more fitted sweater which she says she donned herself. Pt notes she has been able to use the RUE to drive more easily. Objective:     Shoulder ROM 3/9/2020:  Flexion: 130 ° P  Abduction: 108 ° P  ER: 54° P     UEFS; 53/80, 34% impairment     Assessment:  [x]? Progressing toward goals. Pt demonstrates significant improvement in ROM of the right shoulder with less reports of pain since beginning physical therapy (see ROM measurements).  Initiated scar mobilization and right upper trap soft tissue mobilization/trigger point release this date as tightness appears to be the main source of the patient's discomfort. Notable improvement in ROM/flexibility noted after manual work. Pt reports 27% improvement on the upper extremity functional scale this date compared to initial evaluation, which demonstrates vast improvement in perceived functional mobility. Will continue to progress as tolerated. []? No change. []? Other:   [x]? Patient would continue to benefit from skilled physical therapy services in order to restore ROM, improve shoulder strength, and reduce pain levels for optimal functional mobility and return to prior level of functioning.      Problems:    [x]? ? ? R shoulder Pain: 1-2/10 pain at rest, 4/10 with activity   [x]? ? ? ROM: limited R shoulder ROM in all planes   [x]? ? ? Strength:  [x]? ? ? Function: reports 61% impairment on UEFS  [x]? ? Other: difficulty brushing/washing hair, donning/doffing clothes     STG: (to be met in 6 treatments)  1. ? Pain: pt will report <4/10 right shoulder pain during ROM exercises for improved tolerance to restoration of motion-MET, pt reports 1/10   2. ? ROM: pt to display the following ROM for improved ability to complete overhead tasks:  1. Flexion to 125 °-MET   2. Abduction to 100 °-MET  3. ER to 30°-MET   3. ? Function:   1. Pt to report 9 point improvement on UEFS to indicate improved functional mobility   2. Pt to report ability to touch hair/head with R hand for improved ability to brush and wash hair with the RUE-Progress made   3. Pt will report or demo ability to don/doff sweater without significant pain without assist for improved independence-MET         4.   Patient to be independent with home exercise program as demonstrated by performance with correct form without cues. -MET        5. Demonstrate Knowledge of fall prevention-MET     LTG: (to be met in 12 treatments)  1. Pt will report 2/10 or lower R shoulder pain levels with activity for improved QOL  2.  Pt to demo functional and pain free

## 2020-03-12 ENCOUNTER — HOSPITAL ENCOUNTER (OUTPATIENT)
Dept: PHYSICAL THERAPY | Facility: CLINIC | Age: 75
Setting detail: THERAPIES SERIES
Discharge: HOME | End: 2020-03-12
Payer: MEDICARE

## 2020-03-12 ENCOUNTER — OFFICE VISIT (OUTPATIENT)
Dept: ORTHOPEDIC SURGERY | Age: 75
End: 2020-03-12

## 2020-03-12 VITALS — BODY MASS INDEX: 29.88 KG/M2 | HEIGHT: 64 IN | WEIGHT: 175 LBS

## 2020-03-12 PROCEDURE — 97016 VASOPNEUMATIC DEVICE THERAPY: CPT

## 2020-03-12 PROCEDURE — 99024 POSTOP FOLLOW-UP VISIT: CPT | Performed by: ORTHOPAEDIC SURGERY

## 2020-03-12 PROCEDURE — 97110 THERAPEUTIC EXERCISES: CPT

## 2020-03-12 NOTE — PROGRESS NOTES
Chief Complaint   Patient presents with    Follow-up     reverse total shoulder arthroplasty , rt       Patient had undergone right reverse total shoulder arthroplasty on January 31. The patient is still in physical therapy and according to the therapist has improved significantly. Examination the incision remains well-healed. She has full flexion and abduction is almost full as well. She also has excellent external rotation. Her strength is poor and she does feel pain at the deltoid insertion. Diagnosis: Status post right shoulder reverse shoulder arthroplasty. Treatment: She has continued to improve with the physical therapy and she will continue with that including strengthening and will see her again in 4 weeks time. The patient does take 1 Percocet in the morning and 1 at night and Tylenol in between.

## 2020-03-12 NOTE — FLOWSHEET NOTE
pinches 15x5\"  light yellow Band added 3/5   Shoulder shrugs 15x5\" 1# Wt added 3/5   Shoulder isometrics HEP   Flex, abd, ext (ext NOT past neutral)   shld abduction 10x A Added 3/12   shld flexion-thumbs up 10x A Added 3/12             Bicep curls 15x 1#     Forearm pronation/supination HEP     Wrist ext/flex/RD/UD HEP     Other:    Shoulder ROM 3/9/2020:  Flexion: 130 ° P  Abduction: 108 ° P  ER: 54° P    UEFS; 53/80, 34% impairment     Specifics for next session:  Add scaption     Treatment Charges: Mins Units   []  Modalities     [x]  Ther Exercise 40 3   []  Manual Therapy     []  Ther Activities     []  Aquatics     [x]  Vasocompression 10 1   []  Other     Total Treatment time 50 4   Estimated cost of care to date (03/12/20): $590.96    Assessment: [x] Progressing toward goals. Initiated wand ROM for shoulder extension and IR this date with cues to avoid excessive force to prevent dislocation. Pt able to complete new ROM exercises in tolerable range. Also initiated active shoulder flexion and abduction this date with excellent tolerance; pt able to obtain 90 ° in both planes without pain, just reports of muscular fatigue. Will continue to progress as tolerated. [] No change. [] Other:   [x] Patient would continue to benefit from skilled physical therapy services in order to restore ROM, improve shoulder strength, and reduce pain levels for optimal functional mobility and return to prior level of functioning. Problems:    [x]? ? R shoulder Pain: 1-2/10 pain at rest, 4/10 with activity   [x]? ? ROM: limited R shoulder ROM in all planes   [x]? ? Strength:  [x]? ? Function: reports 61% impairment on UEFS  [x]?  Other: difficulty brushing/washing hair, donning/doffing clothes     STG: (to be met in 6 treatments)  1. ? Pain: pt will report <4/10 right shoulder pain during ROM exercises for improved tolerance to restoration of motion-MET, pt reports 1/10   2. ? ROM: pt to display the following ROM for improved ability to complete overhead tasks:  1. Flexion to 125 °-MET   2. Abduction to 100 °-MET  3. ER to 30°-MET   3. ? Function:   1. Pt to report 9 point improvement on UEFS to indicate improved functional mobility   2. Pt to report ability to touch hair/head with R hand for improved ability to brush and wash hair with the RUE-Progress made   3. Pt will report or demo ability to don/doff sweater without significant pain without assist for improved independence-MET         4. Patient to be independent with home exercise program as demonstrated by performance with correct form without cues. -MET        5. Demonstrate Knowledge of fall prevention-MET    LTG: (to be met in 12 treatments)  1. Pt will report 2/10 or lower R shoulder pain levels with activity for improved QOL  2. Pt to demo functional and pain free right shoulder ROM for improved ability to complete ADL's   3. Pt will demo grossly 4/5 R shoulder abduction, flexion,and extension strength for improved stability of joint and ability to carry out lifting, pushing, pulling, etc.    Pt. Education:  [] Yes  [] No  [x] Reviewed Prior HEP/Ed  Method of Education: [x] Verbal   [] Demo  [] Written:  Comprehension of Education:  [x] Verbalizes understanding. [x] Demonstrates understanding. [] Needs review. [x] Demonstrates/verbalizes HEP/Ed previously given. Plan: [x] Continue for 13 visits toward short and long term goals.   [x] Specific Instructions for subsequent treatments: add scaption AROM       Time In: 2:30 pm            Time Out: 3:25 pm    Electronically signed by:  Dyana Blanco PT

## 2020-03-16 ENCOUNTER — HOSPITAL ENCOUNTER (OUTPATIENT)
Dept: PHYSICAL THERAPY | Facility: CLINIC | Age: 75
Setting detail: THERAPIES SERIES
Discharge: HOME | End: 2020-03-16
Payer: MEDICARE

## 2020-03-16 PROCEDURE — 97016 VASOPNEUMATIC DEVICE THERAPY: CPT

## 2020-03-16 PROCEDURE — 97110 THERAPEUTIC EXERCISES: CPT

## 2020-03-16 PROCEDURE — 97140 MANUAL THERAPY 1/> REGIONS: CPT

## 2020-03-19 ENCOUNTER — HOSPITAL ENCOUNTER (OUTPATIENT)
Dept: PHYSICAL THERAPY | Facility: CLINIC | Age: 75
Setting detail: THERAPIES SERIES
Discharge: HOME | End: 2020-03-19
Payer: MEDICARE

## 2020-03-19 PROCEDURE — 97016 VASOPNEUMATIC DEVICE THERAPY: CPT

## 2020-03-19 PROCEDURE — 97110 THERAPEUTIC EXERCISES: CPT

## 2020-03-19 NOTE — FLOWSHEET NOTE
Scapular pinches 15x5\"  lime Band progressed 3/16   Shoulder shrugs 15x5\" 1# Wt added 3/5   Shoulder isometrics HEP   Flex, abd, ext (ext NOT past neutral)   shld abduction 5x peach Added lightest band 3/19   shld flexion-thumbs up 5x peach Added lightest band 3/19   shld scaption 5x peach Added lightest band 3/19             Bicep curls 15x 1#     Forearm pronation/supination HEP     Wrist ext/flex/RD/UD HEP     Other:    Shoulder ROM 3/9/2020:  Flexion: 130 ° P  Abduction: 108 ° P  ER: 54° P    UEFS; 53/80, 34% impairment     Specifics for next session:  Continue to progress deltoid strengthening     Treatment Charges: Mins Units   []  Modalities     [x]  Ther Exercise 40 3   []  Manual Therapy     []  Ther Activities     []  Aquatics     [x]  Vasocompression 15 1   []  Other     Total Treatment time 55 4   Estimated cost of care to date (03/19/20): $760.48    Assessment: [x] Progressing toward goals. Pt appears very stiff during PROM in shoulder abduction and flexion planes. Initiated band resisted shoulder abduction, scaption, and flexion this date with moderate tolerance; pt only able to complete 5 reps in each pain d/t weakness. Will continue to progress strengthening as tolerated. [] No change. [] Other:   [x] Patient would continue to benefit from skilled physical therapy services in order to restore ROM, improve shoulder strength, and reduce pain levels for optimal functional mobility and return to prior level of functioning. Problems:    [x]? ? R shoulder Pain: 1-2/10 pain at rest, 4/10 with activity   [x]? ? ROM: limited R shoulder ROM in all planes   [x]? ? Strength:  [x]? ? Function: reports 61% impairment on UEFS  [x]?  Other: difficulty brushing/washing hair, donning/doffing clothes     STG: (to be met in 6 treatments)  1. ? Pain: pt will report <4/10 right shoulder pain during ROM exercises for improved tolerance to restoration of motion-MET, pt reports 1/10   2. ? ROM: pt to display the following ROM for improved ability to complete overhead tasks:  1. Flexion to 125 °-MET   2. Abduction to 100 °-MET  3. ER to 30°-MET   3. ? Function:   1. Pt to report 9 point improvement on UEFS to indicate improved functional mobility   2. Pt to report ability to touch hair/head with R hand for improved ability to brush and wash hair with the RUE-Progress made   3. Pt will report or demo ability to don/doff sweater without significant pain without assist for improved independence-MET         4. Patient to be independent with home exercise program as demonstrated by performance with correct form without cues. -MET        5. Demonstrate Knowledge of fall prevention-MET    LTG: (to be met in 12 treatments)  1. Pt will report 2/10 or lower R shoulder pain levels with activity for improved QOL  2. Pt to demo functional and pain free right shoulder ROM for improved ability to complete ADL's   3. Pt will demo grossly 4/5 R shoulder abduction, flexion,and extension strength for improved stability of joint and ability to carry out lifting, pushing, pulling, etc.    Pt. Education:  [] Yes  [] No  [x] Reviewed Prior HEP/Ed  Method of Education: [x] Verbal   [] Demo  [] Written:  Comprehension of Education:  [x] Verbalizes understanding. [x] Demonstrates understanding. [] Needs review. [x] Demonstrates/verbalizes HEP/Ed previously given. Plan: [x] Continue for 12 visits toward short and long term goals.   [x] Specific Instructions for subsequent treatments: progress AROM/strengthening        Time In: 3:00 pm            Time Out: 4:00 pm    Electronically signed by:  Lori Guzmán PT

## 2020-03-23 ENCOUNTER — HOSPITAL ENCOUNTER (OUTPATIENT)
Dept: PHYSICAL THERAPY | Facility: CLINIC | Age: 75
Setting detail: THERAPIES SERIES
Discharge: HOME | End: 2020-03-23
Payer: MEDICARE

## 2020-03-23 PROCEDURE — 97016 VASOPNEUMATIC DEVICE THERAPY: CPT

## 2020-03-23 PROCEDURE — 97110 THERAPEUTIC EXERCISES: CPT

## 2020-03-23 NOTE — FLOWSHEET NOTE
[] [facility] &  Therapy  [address]  [phone]  F: [phone] [x] [address]  [facility]   [address]  P: [phone]  F: [phone] [] [address]  Therapy  [address] [facility]  P: [phone]  F: [phone] [] [address]  [facility]   [address]   [place]: [phone]  F: [phone]      Physical Therapy Daily Treatment Note    Date:  3/23/2020  Patient Name:  Alesha Love    :  1945  MRN: [id number]  Physician: Gerard Barry MD                             Insurance: MEDICARE,  PT ONLY  Medical Diagnosis: U87.734 (ICD-10-CM) - S/P reverse total shoulder arthroplasty, right      Rehab Codes: M25.511, M25.611, M62.511  Onset Date: DOS 2020               Next 's appt: 3/12/2020  Visit# / total visits: 10/20; Progress note for Medicare patient due at visit 12    Cancels/No Shows: 0/0    Subjective:    Pain:  [x] Yes  [] No Location: R shoulder  Pain Rating: (0-10 scale) 1-2/10 with use  Pain altered Tx:  [x] No  [] Yes  Action:  Comments: Pt states she was baking on Saturday and noted some increased soreness and difficulty using the RUE to stir. Objective:  Modalities: vasocompression to R shoulder x 15 minutes, seated, min pressure   Precautions: No lifting >8 lbs. Exercises: R shoulder   Exercise Reps/ Time Weight/ Level Comments   UBE 2/ L1 Added 3/19         SUPINE      PROM 10 min   flexion, abduction, ER in scapular plane   MANUAL: IASTM, TPR, scar mobilization  8 min   IASTM and TPR to R UT.  Cross friction scar mobilization and MFR (rolling)   Wand ex: flex, horizontal ab, ER, abduction 10x ea  1# Within tolerable range, added 3/5  Abduction completed in standing          PRONE   Prone ex added 3/23   shld ext 10x A    rows 10x3\" A Horizontal abduction 10x AA    Scapular clocks 10x3\" ea  6, 9         STANDING       Standing wand shld ex  10x 1# Added 3/12   Standing IR wand stretch 10x3\"  1# Added 3/12   Rhythmic stability 3x20\"  Added 3/9         Scapular pinches 15x5\"  lime Band progressed 3/16   Shoulder shrugs 15x5\" 2# Progressed 3/23   Shoulder isometrics HEP   Flex, abd, ext (ext NOT past neutral)   shld abduction 10x  peach Added lightest band 3/19,  increased reps 3/23   shld flexion-thumbs up 5x peach Added lightest band 3/19   shld scaption 10x peach Added lightest band 3/19, increased reps 3/23             Bicep curls 15x 1#     Forearm pronation/supination HEP     Wrist ext/flex/RD/UD HEP     Other:    Shoulder ROM 3/23/2020:  Flexion: 136° P  Abduction: 122° P  ER: 58° P    UEFS: 53/80, 34% impairment     Specifics for next session:  Continue to progress deltoid strengthening     Treatment Charges: Mins Units   []  Modalities     [x]  Ther Exercise 45 3   []  Manual Therapy     []  Ther Activities     []  Aquatics     [x]  Vasocompression 15 1   []  Other     Total Treatment time 60 4   Estimated cost of care to date (03/23/20): $846    Assessment: [x] Progressing toward goals. Pt reports \"pinching\" during shoulder abduction PROM that is relieved with applying manual scapular depression. Improved PROM values noted this date, see above measurements. Progressed scapular strengthening by initiating prone strengthening with good tolerance. Pt requires active assist in order to perform horizontal abduction d/t weakness, denies increase in pain. Issued HEP for prone ex and instructed pt to complete AS TOLERATED. Pt able to complete 10 reps of shoulder abduction and scaption with resistance; still only able to complete 5 reps of shoulder flexion with resistance d/t weakness. Will continue to progress strengthening as tolerated. [] No change.      [] Other:   [x] Patient would continue to benefit from skilled physical therapy review. [] Demonstrates/verbalizes HEP/Ed previously given. Plan: [x] Continue for 12 visits toward short and long term goals.   [x] Specific Instructions for subsequent treatments: progress AROM/strengthening        Time In: 1:57 pm            Time Out: 3:05 pm    Electronically signed by:  Bhargav Miles PT

## 2020-03-26 ENCOUNTER — HOSPITAL ENCOUNTER (OUTPATIENT)
Dept: PHYSICAL THERAPY | Facility: CLINIC | Age: 75
Setting detail: THERAPIES SERIES
Discharge: HOME | End: 2020-03-26
Payer: MEDICARE

## 2020-03-26 PROCEDURE — 97110 THERAPEUTIC EXERCISES: CPT

## 2020-03-26 NOTE — FLOWSHEET NOTE
friction scar mobilization and MFR (rolling)   Wand ex: flex, horizontal ab, ER, abduction 10x ea  1# Within tolerable range, added 3/5  Abduction completed in standing          PRONE   Prone ex added 3/23   shld ext 10x A    rows 10x3\" A    Horizontal abduction 10x AA    Scapular clocks 10x3\" ea  6, 9  Not 3/26         STANDING       Standing wand shld ex  10x 1# Added 3/12   Standing IR wand stretch 10x3\"  1# Added 3/12   Supine Rhythmic stability 3x20\"  Added 3/9         Scapular pinches 15x5\"  lime Band progressed 3/16   Shoulder shrugs 15x5\" 2# Progressed 3/23   Shoulder isometrics HEP   Flex, abd, ext (ext NOT past neutral)   shld abduction 10x  peach Added lightest band 3/19,  increased reps 3/23   shld flexion-thumbs up 5x peach Added lightest band 3/19   shld scaption 10x peach Added lightest band 3/19, increased reps 3/23             Bicep curls 15x 1#     Forearm pronation/supination HEP     Wrist ext/flex/RD/UD HEP     Other:    Shoulder ROM 3/23/2020:  Flexion: 136° P  Abduction: 122° P  ER: 58° P    UEFS: 53/80, 34% impairment     Specifics for next session:  Continue to progress deltoid strengthening     Treatment Charges: Mins Units   []  Modalities     [x]  Ther Exercise 45 3   [x]  Manual Therapy 5 0   []  Ther Activities     []  Aquatics     [x]  Vasocompression     []  Other     Total Treatment time 50 3   Estimated cost of care to date (03/23/20): $922.16    Assessment: [x] Progressing toward goals. Pt denies an increase in R shoulder pain at the end of the session but does note some muscle fatigue. Tenderness over the anterior and lateral deltoid were noted upon palpation, therefore MFR was completed. Good tolerance to PROM in both abduction and flexion without pain complaints. Pt was able to complete all exercises that are logged, however intermittent cueing to remain on task is provided and to focus on proper movement.  Pt continues to require visual cueing to promote proper movement with biceps curls to prevent cross body motion at end range. Pt also continues to require end range assistance with prone shoulder horizontal abduction. Will continue to progress strengthening as tolerated. [] No change. [] Other:   [x] Patient would continue to benefit from skilled physical therapy services in order to restore ROM, improve shoulder strength, and reduce pain levels for optimal functional mobility and return to prior level of functioning. Problems:    [x]? ? R shoulder Pain: 1-2/10 pain at rest, 4/10 with activity   [x]? ? ROM: limited R shoulder ROM in all planes   [x]? ? Strength:  [x]? ? Function: reports 61% impairment on UEFS  [x]? Other: difficulty brushing/washing hair, donning/doffing clothes     STG: (to be met in 6 treatments)  1. ? Pain: pt will report <4/10 right shoulder pain during ROM exercises for improved tolerance to restoration of motion-MET, pt reports 1/10   2. ? ROM: pt to display the following ROM for improved ability to complete overhead tasks:  1. Flexion to 125 °-MET   2. Abduction to 100 °-MET  3. ER to 30°-MET   3. ? Function:   1. Pt to report 9 point improvement on UEFS to indicate improved functional mobility-MET  2. Pt to report ability to touch hair/head with R hand for improved ability to brush and wash hair with the RUE-Progress made   3. Pt will report or demo ability to don/doff sweater without significant pain without assist for improved independence-MET         4. Patient to be independent with home exercise program as demonstrated by performance with correct form without cues. -MET        5. Demonstrate Knowledge of fall prevention-MET    LTG: (to be met in 12 treatments)  1. Pt will report 2/10 or lower R shoulder pain levels with activity for improved QOL  2. Pt to demo functional and pain free right shoulder ROM for improved ability to complete ADL's   3.  Pt will demo grossly 4/5 R shoulder abduction, flexion,and extension strength for improved stability of joint and ability to carry out lifting, pushing, pulling, etc.    Pt. Education:  [x] Yes  [] No  [] Reviewed Prior HEP/Ed  Method of Education: [x] Verbal   [] Demo  [] Written:   Comprehension of Education:  [x] Verbalizes understanding. [x] Demonstrates understanding. [x] Needs review. [] Demonstrates/verbalizes HEP/Ed previously given. Plan: [x] Continue for a total of 20 visits toward short and long term goals.   [x] Specific Instructions for subsequent treatments: progress AROM/strengthening        Time In: 201 pm            Time Out: 3:05 pm    Electronically signed by:  Carlos Aparicio PT

## 2020-03-30 ENCOUNTER — HOSPITAL ENCOUNTER (OUTPATIENT)
Dept: PHYSICAL THERAPY | Facility: CLINIC | Age: 75
Setting detail: THERAPIES SERIES
Discharge: HOME | End: 2020-03-30
Payer: MEDICARE

## 2020-03-30 ENCOUNTER — APPOINTMENT (OUTPATIENT)
Dept: PHYSICAL THERAPY | Facility: CLINIC | Age: 75
End: 2020-03-30
Payer: MEDICARE

## 2020-03-30 PROCEDURE — 97110 THERAPEUTIC EXERCISES: CPT

## 2020-03-30 PROCEDURE — 97016 VASOPNEUMATIC DEVICE THERAPY: CPT

## 2020-03-30 NOTE — FLOWSHEET NOTE
[] [facility]) [facility] &  Therapy  [address]  [phone]  F: [phone] [x] [address]  [facility]   [address]  P: [phone]  F: [phone] [] [address]  Therapy  [facility]  P: [phone]  F: [phone] [] [address]  [facility]   [address]   [place]: [phone]  F: [phone]      Physical Therapy Daily Treatment Note    Date:  3/30/2020  Patient Name:  Bernardino Osorio    :  1945  MRN: [id number]  Physician: Rimma Caceres MD                             Insurance: MEDICARE,  PT ONLY  Medical Diagnosis: P28.435 (ICD-10-CM) - S/P reverse total shoulder arthroplasty, right      Rehab Codes: M25.511, M25.611, M62.511  Onset Date: DOS 2020               Next 's appt:2020  Visit# / total visits: ; Progress note for Medicare patient due at visit 12    Cancels/No Shows: 0/0    Subjective:    Pain:  [x] Yes  [] No Location: R shoulder  Pain Rating: (0-10 scale) 1-2/10 with use  Pain altered Tx:  [x] No  [] Yes  Action:  Comments: Pt  With soreness and stiffness R shoulder in morning and takes tylenol and percocet in AM and in evening. Pt reports she has been on these pain medications for  \"a long time\". Objective:  Modalities: vasocompression to R shoulder x 15 minutes, seated, min pressure   Precautions: No lifting >8 lbs. Exercises: R shoulder BOLDED 3/30/2020  Exercise Reps/ Time Weight/ Level Comments   UBE 2/2 L1 Added 3/19         SUPINE      PROM 10 min   flexion, abduction, ER in scapular plane   MANUAL: IASTM, TPR, scar mobilization  5'  IASTM and TPR to R UT.  Cross friction scar mobilization and MFR (rolling)   Wand ex: flex, horizontal ab, ER, abduction 10x ea  1# Within tolerable range, added 3/5  Abduction completed in standing able to demonstrate proper technique at the end of the session. She has some difficulty comprehending ER stretches but was doing a good job with mild loss of movement. She should get enough stretch with this exercise to keep her ROM during the next several weeks. She will be working on her HEP only due to the COVID-19 outbreak and she is far enough along for now that she can continue on her own. She  will need to concentrate on strengthening. The patient was advised to stop an exercise if she felt herself substituting with movement. It would be beneficial for the patient to be assessed by PT when the clinic is back to work to see how her strength is progressing. Further PT may be warranted at that time. [] No change. [] Other:   [x] Patient would continue to benefit from skilled physical therapy services in order to prevent loss of ROM, improve shoulder strength, for optimal functional mobility and return to prior level of functioning. Problems:    [x]? ? R shoulder Pain: 1-2/10 pain at rest, 4/10 with activity   [x]? ? ROM: limited R shoulder ROM in all planes   [x]? ? Strength:  [x]? ? Function: reports 61% impairment on UEFS  [x]? Other: difficulty brushing/washing hair, donning/doffing clothes     STG: (to be met in 6 treatments)  1. ? Pain: pt will report <4/10 right shoulder pain during ROM exercises for improved tolerance to restoration of motion-MET, pt reports 1/10   2. ? ROM: pt to display the following ROM for improved ability to complete overhead tasks:  1. Flexion to 125 °-MET   2. Abduction to 100 °-MET  3. ER to 30°-MET   3. ? Function:   1. Pt to report 9 point improvement on UEFS to indicate improved functional mobility-MET  2. Pt to report ability to touch hair/head with R hand for improved ability to brush and wash hair with the RUE-Progress made   3. Pt will report or demo ability to don/doff sweater without significant pain without assist for improved independence-MET         4. Patient to be independent with home exercise program as demonstrated by performance with correct form without cues. -MET        5. Demonstrate Knowledge of fall prevention-MET    LTG: (to be met in 12 treatments) (will need to continue for 8 treatments)  1. Pt will report 2/10 or lower R shoulder pain levels with activity for improved QOL: MET 3/30/2020  2. Pt to demo functional and pain free right shoulder ROM for improved ability to complete ADL's MET 3/30/2020  3. Pt will demo grossly 4/5 R shoulder abduction, flexion,and extension strength for improved stability of joint and ability to carry out lifting, pushing, pulling, etc.: NOT MET yet 3/30/2020       Pt. Education:  [x] Yes  [] No  [] Reviewed Prior HEP/Ed  Method of Education: [x] Verbal   [x] Demo: HEP previously given  [] Written:   Comprehension of Education:  [x] Verbalizes understanding. 4r4e\[x] Demonstrates understanding. wq[x] Needs review. [] Demonstrates/verbalizes HEP/Ed previously given. Plan: [x] Continue for a total of 20 visits toward short and long term goals.   [x] Specific Instructions for subsequent treatments: progress AROM/strengthening        Time In: 201 pm            Time Out: 3:05 pm    Electronically signed by:  Renea Funk PT

## 2020-04-02 ENCOUNTER — APPOINTMENT (OUTPATIENT)
Dept: PHYSICAL THERAPY | Facility: CLINIC | Age: 75
End: 2020-04-02
Payer: MEDICARE

## 2020-04-06 ENCOUNTER — APPOINTMENT (OUTPATIENT)
Dept: PHYSICAL THERAPY | Facility: CLINIC | Age: 75
End: 2020-04-06
Payer: MEDICARE

## 2020-04-08 ENCOUNTER — OFFICE VISIT (OUTPATIENT)
Dept: ORTHOPEDIC SURGERY | Age: 75
End: 2020-04-08

## 2020-04-08 VITALS — WEIGHT: 175.04 LBS | BODY MASS INDEX: 29.88 KG/M2 | HEIGHT: 64 IN

## 2020-04-08 PROCEDURE — 99024 POSTOP FOLLOW-UP VISIT: CPT | Performed by: ORTHOPAEDIC SURGERY

## 2020-04-08 RX ORDER — OXYCODONE HYDROCHLORIDE AND ACETAMINOPHEN 5; 325 MG/1; MG/1
1 TABLET ORAL NIGHTLY PRN
Qty: 30 TABLET | Refills: 0 | Status: SHIPPED | OUTPATIENT
Start: 2020-04-08 | End: 2020-05-08

## 2020-04-13 ENCOUNTER — APPOINTMENT (OUTPATIENT)
Dept: PHYSICAL THERAPY | Facility: CLINIC | Age: 75
End: 2020-04-13
Payer: MEDICARE

## 2020-04-15 ENCOUNTER — HOSPITAL ENCOUNTER (OUTPATIENT)
Dept: PHYSICAL THERAPY | Facility: CLINIC | Age: 75
Setting detail: THERAPIES SERIES
Discharge: HOME | End: 2020-04-15
Payer: MEDICARE

## 2020-04-15 PROCEDURE — 97110 THERAPEUTIC EXERCISES: CPT

## 2020-04-15 NOTE — FLOWSHEET NOTE
[] [facility]) [facility] &  Therapy  [address]  [phone]  F: [phone] [x] [address]  [place] [address]   [address]  P: [phone]  F: [phone] [] [address]  Therapy  [address] [facility]  P: [phone]  F: [phone] [] [address]  [facility]   [address]   [place]: [phone]  F: [phone]      Physical Therapy Daily Treatment Note    Date:  4/15/2020  Patient Name:  Cam Mclean    :  1945  MRN: [id number]  Physician: Maria Luisa Dowell MD                             Insurance: MEDICARE,  PT ONLY  Medical Diagnosis: B85.260 (ICD-10-CM) - S/P reverse total shoulder arthroplasty, right      Rehab Codes: M25.511, M25.611, M62.511  Onset Date: DOS 2020               Next 's appt:2020  Visit# / total visits: ; Progress note for Medicare patient due at visit 20    Cancels/No Shows: 0/0    Subjective:    Pain:  [x] Yes  [] No Location: R shoulder  Pain Rating: (0-10 scale) 1-2/10 with use  Pain altered Tx:  [x] No  [] Yes  Action:  Comments: Pt states she has been sewing for hours each day, notes increased soreness with repeated horizontal adduction across body to reach pin cushion. Pt states she has also noticed that she has difficulty eating using right arm. Objective:  Modalities: vasocompression to R shoulder x 15 minutes, seated, min pressure- not today    Precautions: No lifting >8 lbs. Exercises: R shoulder BOLDED completed 04/15/20 on telehealth   Exercise Reps/ Time Weight/ Level Comments   UBE 2/2 L1 Added 3/19         SUPINE      PROM 10 min   flexion, abduction, ER in scapular plane   MANUAL: IASTM, TPR, scar mobilization  5'  IASTM and TPR to R UT.  Cross friction scar mobilization and MFR (rolling)   Wand ex: flex, horizontal ab, ER, demo ability to [employer] without significant pain without assist for improved independence-MET         4. Patient to be independent with home exercise program as demonstrated by performance with correct form without cues. -MET        5. Demonstrate Knowledge of fall prevention-MET    LTG: (to be met in 12 treatments) (will need to continue for 8 treatments)  1. Pt will report 2/10 or lower R shoulder pain levels with activity for improved QOL: MET 3/30/2020  2. Pt to demo functional and pain free right shoulder ROM for improved ability to complete ADL's MET 3/30/2020  3. Pt will demo grossly 4/5 R shoulder abduction, flexion,and extension strength for improved stability of joint and ability to carry out lifting, pushing, pulling, etc.: NOT MET yet 3/30/2020       Pt. Education:  [x] Yes  [] No  [] Reviewed Prior HEP/Ed  Method of Education: [x] Verbal   [x] Demo: HEP previously given  [] Written:   Comprehension of Education:  [x] Verbalizes understanding. 4r4e\[x] Demonstrates understanding [x] Needs review. [] Demonstrates/verbalizes HEP/Ed previously given. Plan: [x] Continue for a total of 20 visits toward short and long term goals. [x] Specific Instructions for subsequent treatments: progress AROM/strengthening        Time In: 2:45 pm            Time Out: 3:30 pm    Pursuant to the emergency declaration under the [facility], [address] [place] [address] authority and the [employer] Act, this Virtual Visit was conducted with patient's (and/or legal guardian's) consent, to reduce the patient's risk of exposure to COVID-19 and provide necessary medical care. The patient (and/or legal guardian) has also been advised to contact this office for worsening conditions or problems, and seek emergency medical treatment and/or call 911 if deemed necessary.      Patient identification was verified at the start of the visit:

## 2020-04-16 ENCOUNTER — APPOINTMENT (OUTPATIENT)
Dept: PHYSICAL THERAPY | Facility: CLINIC | Age: 75
End: 2020-04-16
Payer: MEDICARE

## 2020-04-22 ENCOUNTER — HOSPITAL ENCOUNTER (OUTPATIENT)
Dept: PHYSICAL THERAPY | Facility: CLINIC | Age: 75
Setting detail: THERAPIES SERIES
Discharge: HOME | End: 2020-04-22
Payer: MEDICARE

## 2020-04-22 PROCEDURE — 97110 THERAPEUTIC EXERCISES: CPT

## 2020-04-22 NOTE — FLOWSHEET NOTE
[] [facility]) [facility] &  Therapy  [address]  [phone]  F: [phone] [x] [address]  [address]"" 36   [address]  P: [phone]  F: [phone] [] [address] &  Therapy  [facility]  P: [phone]  F: [phone] [] [address]  [facility]   [address]   [place]: [phone]  F: [phone]      Physical Therapy Daily Treatment Note    Date:  2020  Patient Name:  Adin Cantu    :  1945  MRN: [id number]  Physician: Latrice Lopez MD                             Insurance: MEDICARE,  PT ONLY  Medical Diagnosis: U09.195 (ICD-10-CM) - S/P reverse total shoulder arthroplasty, right      Rehab Codes: M25.511, M25.611, M62.511  Onset Date: DOS 2020               Next 's appt:2020  Visit# / total visits: ; Progress note for Medicare patient due at visit 20    Cancels/No Shows: 0/0    Subjective:    Pain:  [x] Yes  [] No Location: R shoulder  Pain Rating: (0-10 scale) 1-2/10 with use  Pain altered Tx:  [x] No  [] Yes  Action:  Comments: Pt states she found a pin cushion and has noticed decreased soreness with use of this. Has been completing stretching and strengthening exercises every other day. Objective:  Modalities: vasocompression to R shoulder x 15 minutes, seated, min pressure- not today    Precautions: No lifting >8 lbs. Exercises: R shoulder BOLDED completed 20 on telehealth   Exercise Reps/ Time Weight/ Level Comments   UBE 2/2 L1 Added 3/19         SUPINE      PROM 10 min   flexion, abduction, ER in scapular plane   MANUAL: IASTM, TPR, scar mobilization  5'  IASTM and TPR to R UT.  Cross friction scar mobilization and MFR (rolling)   Wand ex: flex, horizontal ab, ER, abduction 10x ea  1# Within tolerable range, added 3/5  Abduction completed in standing          PRONE   Completed bent over with LUE support on bed 4/22   shld ext 10x A    rows 10x3\" A    Horizontal abduction 10x AA    Scapular clocks 10x3\" ea  6,9         STANDING       Wall slides-flex/abd 10xea  Added 4/15   Standing wand shld ex  10x cane Added 3/12   Standing IR wand stretch 10x3\"  cane Added 3/12   Supine Rhythmic stability 3x20\"  Added 3/9         Scapular pinches 15x5\"  lime Band progressed 3/16   Shoulder shrugs 15x5\" 2# Progressed 3/23   Shoulder isometrics HEP   Flex, abd, ext (ext NOT past neutral)   shld abduction 10x  peach Added lightest band 3/19,  increased reps 3/23   shld flexion-thumbs up 10x peach Increased reps 4/15   shld scaption 10x peach Added lightest band 3/19, increased reps 3/23   Shoulder press 2x5 1# Fatigued afterwards, added 4/22   Bicep curls 10x 1#+soup can Increased resistance 4/15   Bent-arm lateral raise   15x 1#+ soup can  Added 4/22   Forearm pronation/supination HEP     Wrist ext/flex/RD/UD HEP     Other:    3/30/2020 ROM            A/P ROM A/P STRENGTH  STRENGTH    LEFT RIGHT LEFT RIGHT   SHLD FLEX  157  4   SHLD ABD  155  4-   SHLD ER  71  nt   SHLD IR  75  4+   SHOULDER EXT    3-*   Middle trapezius    3+   rhomboids    3-*   *lacks full AROM due to stiffness; in available ROM strength is 4/5    3/30/2020: UEFS:61/80     Specifics for next session:   plate drivers? Treatment Charges: Mins Units   []  Modalities     [x]  Ther Exercise 35 2   []  Manual Therapy     []  Ther Activities     []  Aquatics     []  Vasocompression     []  Other     Total Treatment time     Estimated cost of care to date (04/22/20): $1,136.78    Assessment: [x] Progressing toward goals: continued with telehealth this date with good understanding and audio/visuals.  Started with wall slides to warm up the shoulder and then initiated shoulder presses and bent elbow lateral raises this date with good tolerance; pt demonstrates compensatory methods at end reps shoulder abduction, flexion,and extension strength for improved stability of joint and ability to carry out lifting, pushing, pulling, etc.: NOT MET yet 3/30/2020       Pt. Education:  [x] Yes  [] No  [] Reviewed Prior HEP/Ed  Method of Education: [x] Verbal   [x] Demo:   [x] Written: Bent-arm lateral raise, shoulder press    Comprehension of Education:  [x] Verbalizes understanding. [x] Demonstrates understanding [x] Needs review. [] Demonstrates/verbalizes HEP/Ed previously given. Plan: [x] Continue for a total of 20 visits toward short and long term goals. [x] Specific Instructions for subsequent treatments: progress AROM/strengthening        Time In: 12:00 pm            Time Out: 12:39 pm    Pursuant to the emergency declaration under the [address] [place] [address], [facility] waiver authority and the [employer] and [employer] Act, this Virtual Visit was conducted with patient's (and/or legal guardian's) consent, to reduce the patient's risk of exposure to COVID-19 and provide necessary medical care. The patient (and/or legal guardian) has also been advised to contact this office for worsening conditions or problems, and seek emergency medical treatment and/or call 911 if deemed necessary. Patient identification was verified at the start of the visit: YES      Services were provided through a video synchronous discussion virtually to substitute for in-person clinic visit. Patient was located at their individual home. An electronic signature was used to authenticate this note.     Electronically signed by:  Randy Goodson PT

## 2020-04-30 ENCOUNTER — HOSPITAL ENCOUNTER (OUTPATIENT)
Dept: PHYSICAL THERAPY | Facility: CLINIC | Age: 75
Setting detail: THERAPIES SERIES
Discharge: HOME | End: 2020-04-30
Payer: MEDICARE

## 2020-04-30 PROCEDURE — 97110 THERAPEUTIC EXERCISES: CPT

## 2020-04-30 NOTE — VIRTUAL HEALTH
[] [facility]) [facility] &  Therapy  [address]  [phone]  F: [phone] [x] [address]  [facility]   [address]  P: [phone]  F: [phone] [] [address] &  Therapy  [address]  P: [phone]  F: [phone] [] [address]  [facility]   [address]   [place]: [phone]  F: [phone]      Physical Therapy Daily Treatment Note    Date:  2020  Patient Name:  Elmira Fajardo    :  1945  MRN: [id number]  Physician: Dany Soares MD                             Insurance: MEDICARE,  PT ONLY  Medical Diagnosis: O65.163 (ICD-10-CM) - S/P reverse total shoulder arthroplasty, right      Rehab Codes: M25.511, M25.611, M62.511  Onset Date: DOS 2020               Next 's appt:2020  Visit# / total visits: 15/20; Progress note for Medicare patient due at visit 20    Cancels/No Shows: 0/0    Subjective:    Pain:  [x] Yes  [] No Location: R shoulder  Pain Rating: (0-10 scale) 1-2/10 with use  Pain altered Tx:  [x] No  [] Yes  Action:  Comments: Pt reports that her R shoulder is achy, notes she has been working on her quilting a lot. Pt states that she has been trying to complete her new exercises weekly however fears she may be doing them wrong. Objective:  Modalities: vasocompression to R shoulder x 15 minutes, seated, min pressure- not today    Precautions: No lifting >8 lbs. Exercises: R shoulder BOLDED completed 20 on telehealth   Exercise Reps/ Time Weight/ Level Comments   UBE 2/2 L1 Added 3/19         SUPINE      PROM 10 min   flexion, abduction, ER in scapular plane   MANUAL: IASTM, TPR, scar mobilization  5'  IASTM and TPR to R UT.  Cross friction scar mobilization and MFR (rolling)   Wand ex: flex, horizontal ab, ER, abduction 10x ea  1# proper completion of bent elbow lateral raises and shoulder presses this date with good carry over. Pt reports increased fatigue following strengthening exercises and notes onset of right upper trap discomfort. Pt will present to clinic in person next week for in person re-assessment. Will continue to progress deltoid strengthening as tolerated. [] No change. [] Other:   [x] Patient would continue to benefit from skilled physical therapy services in order to prevent loss of ROM, improve shoulder strength, for optimal functional mobility and return to prior level of functioning. Problems:    [x]? ? R shoulder Pain: 1-2/10 pain at rest, 4/10 with activity   [x]? ? ROM: limited R shoulder ROM in all planes   [x]? ? Strength:  [x]? ? Function: reports 61% impairment on UEFS  [x]? Other: difficulty brushing/washing hair, donning/doffing clothes     STG: (to be met in 6 treatments)  1. ? Pain: pt will report <4/10 right shoulder pain during ROM exercises for improved tolerance to restoration of motion-MET, pt reports 1/10   2. ? ROM: pt to display the following ROM for improved ability to complete overhead tasks:  1. Flexion to 125 °-MET   2. Abduction to 100 °-MET  3. ER to 30°-MET   3. ? Function:   1. Pt to report 9 point improvement on UEFS to indicate improved functional mobility-MET  2. Pt to report ability to touch hair/head with R hand for improved ability to brush and wash hair with the RUE-Progress made   3. Pt will report or demo ability to don/doff sweater without significant pain without assist for improved independence-MET         4. Patient to be independent with home exercise program as demonstrated by performance with correct form without cues. -MET        5. Demonstrate Knowledge of fall prevention-MET    LTG: (to be met in 12 treatments) (will need to continue for 8 treatments)  1. Pt will report 2/10 or lower R shoulder pain levels with activity for improved QOL: MET 3/30/2020  2.  Pt to

## 2020-05-07 ENCOUNTER — TELEPHONE (OUTPATIENT)
Dept: ORTHOPEDIC SURGERY | Age: 75
End: 2020-05-07

## 2020-05-07 ENCOUNTER — HOSPITAL ENCOUNTER (OUTPATIENT)
Dept: PHYSICAL THERAPY | Facility: CLINIC | Age: 75
Setting detail: THERAPIES SERIES
Discharge: HOME | End: 2020-05-07
Payer: MEDICARE

## 2020-05-07 PROCEDURE — 97110 THERAPEUTIC EXERCISES: CPT

## 2020-05-07 RX ORDER — TRAMADOL HYDROCHLORIDE 50 MG/1
50 TABLET ORAL EVERY 8 HOURS PRN
Qty: 42 TABLET | Refills: 0 | Status: SHIPPED | OUTPATIENT
Start: 2020-05-07 | End: 2020-06-17 | Stop reason: SDUPTHER

## 2020-05-07 NOTE — DISCHARGE SUMMARY
complete overhead tasks:  1. Flexion to 125 °-MET   2. Abduction to 100 °-MET  3. ER to 30°-MET   3. ? Function:   1. Pt to report 9 point improvement on UEFS to indicate improved functional mobility-MET  2. Pt to report ability to touch hair/head with R hand for improved ability to brush and wash hair with the RUE-Progress made   3. Pt will report or demo ability to don/doff sweater without significant pain without assist for improved independence-MET         4.   Patient to be independent with home exercise program as demonstrated by performance with correct form without cues. -MET        5. Demonstrate Knowledge of fall prevention-MET     LTG: (to be met in 12 treatments) (will need to continue for 8 treatments)  1. Pt will report 2/10 or lower R shoulder pain levels with activity for improved QOL: MET 3/30/2020  2. Pt to demo functional and pain free right shoulder ROM for improved ability to complete ADL's MET 3/30/2020  3. Pt will demo grossly 4/5 R shoulder abduction, flexion,and extension strength for improved stability of joint and ability to carry out lifting, pushing, pulling, etc.: see measurements above     Treatment to Date:  [x] Therapeutic Exercise    [] Modalities:  [] Therapeutic Activity    [] Ultrasound  [] Electrical Stimulation  [] Gait Training     [] Massage       [] Lumbar/Cervical Traction  [] Neuromuscular Re-education [] Cold/hotpack [] Iontophoresis: 4 mg/mL  [x] Instruction in Home Exercise Program                     Dexamethasone Sodium  [x] Manual Therapy             Phosphate 40-80 mAmin  [] Aquatic Therapy                   [] Vasocompression/    [] Other:             Game Ready    Discharge Status:         [x] Pt received maximum benefit. No further therapy indicated at this time. [x] Pt to continue exercise/home instructions independently.             Electronically signed by Jerzy Carbajal PT on 5/7/2020 at 6:15 PM      If you have any questions or concerns, please don't hesitate to call.   Thank you for your referral.

## 2020-06-17 RX ORDER — TRAMADOL HYDROCHLORIDE 50 MG/1
50 TABLET ORAL EVERY 8 HOURS PRN
Qty: 42 TABLET | Refills: 0 | Status: SHIPPED | OUTPATIENT
Start: 2020-06-17 | End: 2020-07-29 | Stop reason: SDUPTHER

## 2020-06-29 ENCOUNTER — TELEPHONE (OUTPATIENT)
Dept: ORTHOPEDIC SURGERY | Age: 75
End: 2020-06-29

## 2020-06-29 DIAGNOSIS — Z96.611 S/P REVERSE TOTAL SHOULDER ARTHROPLASTY, RIGHT: Primary | ICD-10-CM

## 2020-06-29 RX ORDER — AMOXICILLIN 500 MG/1
CAPSULE ORAL
Qty: 4 CAPSULE | Refills: 0 | Status: SHIPPED | OUTPATIENT
Start: 2020-06-29 | End: 2020-07-30 | Stop reason: SDUPTHER

## 2020-07-29 RX ORDER — TRAMADOL HYDROCHLORIDE 50 MG/1
50 TABLET ORAL EVERY 8 HOURS PRN
Qty: 42 TABLET | Refills: 0 | Status: SHIPPED | OUTPATIENT
Start: 2020-07-29 | End: 2020-09-08 | Stop reason: SDUPTHER

## 2020-07-29 NOTE — TELEPHONE ENCOUNTER
Right shoulder reverse arthroplasty DOS:1/31/2020     Patient would like a refill on pain medication

## 2020-07-30 ENCOUNTER — TELEPHONE (OUTPATIENT)
Dept: ORTHOPEDIC SURGERY | Age: 75
End: 2020-07-30

## 2020-07-30 RX ORDER — AMOXICILLIN 500 MG/1
CAPSULE ORAL
Qty: 4 CAPSULE | Refills: 0 | Status: SHIPPED | OUTPATIENT
Start: 2020-07-30 | End: 2021-07-07 | Stop reason: SDUPTHER

## 2020-07-30 NOTE — TELEPHONE ENCOUNTER
Patient called in stating she is due to have dental work on Aug 4,2020. She is concerned and wouldliek to know if she should be on an abx prior to and after the work. If so will you please send in the most recent antibiotic that was prescribed by you to her [address].     Call pt at [phone]

## 2020-08-31 ENCOUNTER — TELEPHONE (OUTPATIENT)
Dept: ORTHOPEDIC SURGERY | Age: 75
End: 2020-08-31

## 2020-08-31 RX ORDER — AMOXICILLIN 500 MG/1
500 CAPSULE ORAL 2 TIMES DAILY
Qty: 4 CAPSULE | Refills: 0 | Status: SHIPPED | OUTPATIENT
Start: 2020-08-31 | End: 2020-09-29 | Stop reason: SDUPTHER

## 2020-08-31 NOTE — TELEPHONE ENCOUNTER
Patient is having dental work (crown put on) on sept 3 , this Thursday. Pt is requesting abx sent in to her [facility] pharmacy in [facility] [address]. Also she is requesting more PT to [facility]. Please advise      VERBALLY SPOKE WITH DR REGALADO REGARDING THIS/ PER DR REGALADO   OK TO SEND IN AMOXICILLIN 500 BID x2DAYS (take first dose 2 hours prior to dental procedure.   Okay to order PT

## 2020-09-08 RX ORDER — TRAMADOL HYDROCHLORIDE 50 MG/1
50 TABLET ORAL EVERY 8 HOURS PRN
Qty: 42 TABLET | Refills: 0 | Status: SHIPPED | OUTPATIENT
Start: 2020-09-08 | End: 2020-09-22

## 2020-09-08 NOTE — TELEPHONE ENCOUNTER
Patient calling in for a refill of pain med tramadol. States she only has 6 left, and is heading out to [place] tomorrow.   Please advise  Med pending

## 2020-09-18 ENCOUNTER — HOSPITAL ENCOUNTER (OUTPATIENT)
Dept: PHYSICAL THERAPY | Facility: CLINIC | Age: 75
Setting detail: THERAPIES SERIES
Discharge: HOME | End: 2020-09-18
Payer: MEDICARE

## 2020-09-18 PROCEDURE — 97110 THERAPEUTIC EXERCISES: CPT

## 2020-09-18 PROCEDURE — 97140 MANUAL THERAPY 1/> REGIONS: CPT

## 2020-09-18 PROCEDURE — 97161 PT EVAL LOW COMPLEX 20 MIN: CPT

## 2020-09-18 NOTE — FLOWSHEET NOTE
Ernie Fall Risk Assessment    Patient Name:  France Epperson  : 1945        Risk Factor Scale  Score   History of Falls [] Yes  [x] No 25  0    Secondary Diagnosis [] Yes  [x] No 15  0    Ambulatory Aid [] Furniture  [] Crutches/cane/walker  [x] None/bedrest/wheelchair/nurse 30  15  0    IV/Heparin Lock [] Yes  [x] No 20  0    Gait/Transferring [] Impaired  [] Weak  [x] Normal/bedrest/immobile 20  10  0    Mental Status [] Forgets limitations  [x] Oriented to own ability 15  0       Total: 0     Based on the Assessment score: check the appropriate box.     [x]  No intervention needed   Low =   Score of 0-24    []  Use standard prevention interventions Moderate =  Score of 24-44   [] Give patient handout and discuss fall prevention strategies   [] Establish goal of education for patient/family RE: fall prevention strategies    []  Use high risk prevention interventions High = Score of 45 and higher   [] Give patient handout and discuss fall prevention strategies   [] Establish goal of education for patient/family Re: fall prevention strategies   [] Discuss lifeline / other resources    Electronically signed by:   Deana Tucker PT  Date: 2020

## 2020-09-18 NOTE — CONSULTS
[] [facility]) [facility] CENTER &  Therapy  [address]  [phone]  F: [phone] [] [address]  [place] [address]   [address]  P: [phone]  F: [phone] [] [place]  [address]  P: [phone]  F: [phone] [] [address]  [facility]   [address]   [place]: [phone]  F: [phone]      Physical Therapy Upper Extremity Evaluation    Date:  2020  Patient: Jose Miguel Duncan  : 1945  MRN: [id number]  Physician: Danny Cohen MD    Insurance: Medicare, Secondary  BMN  Medical Diagnosis: E68.651 (ICD-10-CM) - S/P reverse total shoulder arthroplasty, right    Rehab Codes: M25.511, M25.611  Onset Date: DOS 20   Next 's appt: to be scheduled as needed     Subjective:   CC: R shoulder pain, stiffness, and weakness      HPI: Pt presents to clinic 8 months s/p R reverse total shoulder. Pt was previously attending physical therapy here with good relief and notable improvement in ROM and functional mobility. Pt states over the last several weeks she noticed onset of occasional sharp shooting pain located in the anterior aspect of the shoulder. Also reports she continues to have generalized weakness in the R shoulder. Pt admits she has not been keeping up with former home exercises; states she wanted to come back to know what exercises she should do. Reports onset of R upper trap tightness/stiffness. PMHx: [x]? HTN       [x]? Cancer-basal cell       [x]? Arthritis           [x]? Other: currently smoker, Reverse total shoulder on R completed 20              [x]? Refer to full medical chart  In EPIC      Comorbidities:   []? Obesity []? Dialysis  [x]? Other: smoker    []? Asthma/COPD []? Dementia []? Other:   []? Stroke []? Sleep apnea []?  Other:   []? Vascular disease []? Rheumatic disease []? Other:      Tests:  [x]? X-Ray:  Impression   1. Uncomplicated right reverse shoulder arthroplasty. 2. Mild osteoarthritic changes of the right acromioclavicular joint. 3. Bony demineralization.         Medications: [x]? Refer to full medical record              Allergies:        [x]? None    Function:  Hand Dominance  [x]? Right    Patient lives with:  lives alone    In what type of home [x]? One story   []? Two story   []? Split level   Number of stairs to enter 3 YONATHAN   With handrail on the [x]? Right to enter   [x]? Left to enter   Bathroom has a []? Tub only  [x]? Tub/shower combo   []? Walk in shower    [x]? Grab eSNF   Washing machine is on []? Main level   []? Second level   []? Basement   Employer     Job Status []? Normal duty   []? Light duty   []? Off due to condition    [x]? Retired   []? Not employed   []? Disability  []? Other:  []? Return to work: Work activities/duties  cooking, sewing- can do both with minimal difficulty          ADL/IADL Previous level of function Current level of function Who currently assists the patient with task   Bathing  []? Independent  []? Assist [x]? Independent  []? Assist     Dress/grooming []? Independent  []? Assist [x]? Independent  []? Assist     Transfer/mobility []? Independent  []? Assist [x]? Independent  []? Assist     Feeding []? Independent  []? Assist [x]? Independent  []? Assist     Toileting []? Independent  []? Assist [x]? Independent  []? Assist     Driving []? Independent  []? Assist [x]? Independent  []? Assist Has some difficulty with making turns with the RUE   Housekeeping []? Independent  []? Assist [x]? Independent  []? Assist    Grocery shop/meal prep []? Independent  []? Assist [x]? Independent  []? Assist       Gait Prior level of function Current level of function     [x]? Independent  []? Assist [x]? Independent  []? Assist   Device: [x]? Independent [x]? Independent     []?  Straight Patient goals:  \"know specific exercises for affected area\"    Rehab Potential:  [x] Good  [] Fair  [] Poor   Suggested Professional Referral:  [x] No  [] Yes:  Barriers to Goal Achievement:  [x] No  [] Yes:  Domestic Concerns:  [x] No  [] Yes:    Pt. Education:  [x] Plans/Goals, Risks/Benefits discussed  [x] Home exercise program  Method of Education: [x] Verbal  [x] Demo  [x] Written  Comprehension of Education:  [x] Verbalizes understanding. [x] Demonstrates understanding. [x] Needs Review. [] Demonstrates/verbalizes understanding of HEP/Ed previously given. Treatment Plan:  [x] Therapeutic Exercise   66921  [] Iontophoresis: 4 mg/mL Dexamethasone Sodium Phosphate  mAmin  47002   [x] Therapeutic Activity  52412 [x] Vasopneumatic cold with compression  40287    [] Gait Training   12860 [] Ultrasound   71208   [x] Neuromuscular Re-education  33874 [] Electrical Stimulation Unattended  30668   [x] Manual Therapy  51166 [] Electrical Stimulation Attended  08035   [x] Instruction in HEP  [] Lumbar/Cervical Traction  37521   [] Aquatic Therapy   29750 [x] Cold/hotpack    [] Massage   78587      [] Dry Needling, 1 or 2 muscles  81885   [] Biofeedback, first 15 minutes   26161  [] Biofeedback, additional 15 minutes   52564 [] Dry Needling, 3 or more muscles  18439     []  Medication allergies reviewed for use of    Dexamethasone Sodium Phosphate 4mg/ml     with iontophoresis treatments. Pt is not allergic.        Frequency: 1-2 x/week for 12 visits    Todays Treatment:  Modalities:   Precautions: R reverse total shoulder, DOS 1/31/20  Exercises:  Exercise Reps/ Time Weight/ Level Comments   Upper trap stretch 5x20\"     Levator stretch 5x20\"     STM and TPR over R UT, levator 8 min  Palpable trigger points noted in UT/levator, some release noted after manual         R Shld AROM 10x ea  Flexion, abduction slow and controlled    shld abduction 10x peach Through full ROM    shld flexion 10x Peach Through full ROM                SCAR MOBS 6 min  Over surgical scar: lateral mob and pill rolling    Other:    Specific Instructions for next treatment: continue manual to R UT, levator and surgical scar. Initiate scapular strengthening       Evaluation Complexity:  History (Personal factors, comorbidities) [] 0 [x] 1-2 [] 3+   Exam (limitations, restrictions) [] 1-2 [x] 3 [] 4+   Clinical presentation (progression) [x] Stable [] Evolving  [] Unstable   Decision Making [x] Low [] Moderate [] High    [x] Low Complexity [] Moderate Complexity [] High Complexity       Treatment Charges: Mins Units   [x] Evaluation       [x]  Low       []  Moderate       []  High 35 1   []  Modalities     [x]  Ther Exercise 10 1   [x]  Manual Therapy 14 1   []  Ther Activities     []  Aquatics     []  Vasocompression     []  Other     Estimated cost of care to date (9/18/20) $127.74    TOTAL TREATMENT TIME: 59 minutes     Time in: 2:15 pm    Time Out: 3:20 pm     Electronically signed by: Ernesto Burnette PT        Physician Signature:________________________________Date:__________________  By signing above or cosigning this note, I have reviewed this plan of care and certify a need for medically necessary rehabilitation services.      *PLEASE SIGN ABOVE AND FAX BACK ALL PAGES*

## 2020-09-18 NOTE — PLAN OF CARE
[] [facility]) [facility] CENTER &  Therapy  [address]  [phone]  F: [phone] [x] [address]  [place] [address]   [address]  P: [phone]  F: [phone] [] [place]  [address]  P: [phone]  F: [phone] [] [address]  [facility]   [address]   [place]: [phone]  F: [phone]        Physical Therapy Plan of Care    Date:  2020  Patient: Mele Turner  : 1945  MRN: [id number]  Physician: Carlita Wood MD                            Insurance: Medicare, Secondary  BMN  Medical Diagnosis: T01.179 (ICD-10-CM) - S/P reverse total shoulder arthroplasty, right      Rehab Codes: M25.511, M25.611  Onset Date: DOS 20                  Next 's appt: to be scheduled as needed      Subjective:   CC: R shoulder pain, stiffness, and weakness       HPI: Pt presents to clinic 8 months s/p R reverse total shoulder. Pt was previously attending physical therapy here with good relief and notable improvement in ROM and functional mobility. Pt states over the last several weeks she noticed onset of occasional sharp shooting pain located in the anterior aspect of the shoulder. Also reports she continues to have generalized weakness in the R shoulder. Pt admits she has not been keeping up with former home exercises; states she wanted to come back to know what exercises she should do. Reports onset of R upper trap tightness/stiffness.     Assessment:  Patient is a pleasant 69year old female who presents to physical therapy with reports of returned stiffness/tightness,weakness, and increased pain of the R shoulder s/p 8 months reverse TSA.  Pt's current deficits include limited R shoulder ROM in all planes, weak R shoulder and scapular muscles, tight R upper trap and levator musculature, and scar hypomobility that is limiting her functional mobility. Pt would benefit from skilled physical therapy services in order to: restore ROM, mobilize scar and tight musculature, and improve R shoulder strength for improved functional mobility and optimal quality of life.     Problems:    [x]? ? Pain:  [x]? ? ROM:  [x]? ? Strength:  [x]? ? Function:  [x]? Other:       STG: (to be met in 6 treatments)  1. ? Pain: pt will report <3/10 right shoulder pain on average during exercises for improved tolerance to restoration of motion   2. ? ROM: pt to display the following AROM against gravity for improved ability to complete overhead tasks:  1. Flexion to 145°   2. Abduction to 140°  3. ? Function:   1. Pt to report 9 point improvement on UEFS to indicate improved functional mobility   2. Pt to report ability to brush/style hair with R hand without increased pain or significant pain for improved ability to complete ADLs  4. Patient to be independent with home exercise program as demonstrated by performance with correct form without cues. 5. Demonstrate Knowledge of fall prevention  LTG: (to be met in 12 treatments)  1. Pt will report 1/10 or lower R shoulder pain levels with activity for improved QOL  2. Pt to demo functional and pain free right shoulder ROM for improved ability to complete ADL's   3. Pt will demo grossly 4+/5 R shoulder abduction, flexion,and IR strength for improved stability of joint and ability to carry out lifting, pushing, pulling, etc.  4. Pt will demo grossly 4/5 R scapular stabilizer strength for improved scapulothoracic rhythm during overhead movements  5. Pt will demo ability to lift 2# weight overhead onto a high cabinet without increased pain or significant difficulty for improved ease of putting groceries away    Treatment Plan:  [x]? Therapeutic Exercise   56056             []? Iontophoresis: 4 mg/mL Dexamethasone Sodium Phosphate  mAmin  02714   [x]? Therapeutic Activity  32897 [x]? Vasopneumatic cold with compression  [id number]               []? Gait Training    84837 []? Ultrasound        [id number]   [x]? Neuromuscular Re-education  [id number] []? Electrical Stimulation Unattended  22 [id number]   [x]? Manual Therapy  02266 []? Electrical Stimulation Attended  [id number]   [x]? Instruction in HEP       []? Lumbar/Cervical Traction  [id number]   []? Aquatic Therapy           [id number] [x]? Cold/hotpack     []? Massage   90970      []? Dry Needling, 1 or 2 muscles  10470   []? Biofeedback, first 15 minutes   57307  []? Biofeedback, additional 15 minutes   03862 []? Dry Needling, 3 or more muscles  79219      []? Medication allergies reviewed for use of               Dexamethasone Sodium Phosphate 4mg/ml                with iontophoresis treatments. Pt is not allergic.                         Frequency: 1-2 x/week for 12 visits      Electronically signed by: Erich Guillen PT        Physician Signature:________________________________Date:__________________  By signing above or cosigning this note, I have reviewed this plan of care and certify a need for medically necessary rehabilitation services.      *PLEASE SIGN ABOVE AND FAX BACK ALL PAGES*

## 2020-09-22 ENCOUNTER — HOSPITAL ENCOUNTER (OUTPATIENT)
Dept: PHYSICAL THERAPY | Facility: CLINIC | Age: 75
Setting detail: THERAPIES SERIES
Discharge: HOME | End: 2020-09-22
Payer: MEDICARE

## 2020-09-22 PROCEDURE — 97110 THERAPEUTIC EXERCISES: CPT

## 2020-09-22 PROCEDURE — 97140 MANUAL THERAPY 1/> REGIONS: CPT

## 2020-09-22 NOTE — FLOWSHEET NOTE
[] [facility]) [facility] &  Therapy  [address]  [phone]  F: [phone] [x] [address]  [place] [address]   [address]  P: [phone]  F: [phone] [] [address]  Therapy  [address]  P: [phone]  F: [phone] [] [address]  [facility]   [address]   [place]: [phone]  F: [phone]      Physical Therapy Daily Treatment Note    Date:  2020  Patient Name:  Lior Hughes    :  1945  MRN: [id number]  Physician: Tyree Donaldson MD                            Insurance: Medicare, Secondary  BMN  Medical Diagnosis: A03.791 (ICD-10-CM) - S/P reverse total shoulder arthroplasty, right      Rehab Codes: M25.511, M25.611  Onset Date: DOS 20                  Next 's appt: to be scheduled as needed      Visit# / total visits: ; Progress note for Medicare patient due at visit 8-10     Cancels/No Shows: 0/0    Subjective:    Pain:  [x] Yes  [] No Location: R shoulder Pain Rating: (0-10 scale) 5/10  Pain altered Tx:  [] No  [] Yes  Action:  Comments: Pt states she was sore on  after completing all of her HEP (only time she has completed HEP since  despite recommendations for daily completion) and baking two cakes.      Objective:  Modalities: pt to ice at home   Precautions: R reverse total shoulder, DOS 20  Exercises:  Exercise Reps/ Time Weight/ Level Comments   Pulleys 2/2  Flex, abd   Wand exercises 10x5\"ea  Flex, horizontal abduction         Prone       Shoulder ext 10x3\"     Rows 10x3\"     T 10x3\"           Upper trap stretch 5x20\"       Levator stretch 5x20\"       STM and TPR over R UT, levator 8 min   Palpable trigger points noted in UT/levator, some release noted after manual             R Shld AROM 10x ea   Flexion, abduction slow and controlled    shld abduction 10x peach Through full ROM    shld flexion 10x Peach  Through full ROM    Wall push up 15x           Horizontal abd on wall 10x ea peach              SCAR MOBS Not today    Over surgical scar: lateral mob and pill rolling    Other:     Specific Instructions for next treatment: continue manual to R UT, levator and surgical scar. progress scapular/shoulder strengthening        Treatment Charges: Mins Units   []  Modalities     [x]  Ther Exercise 38 2   [x]  Manual Therapy 8 1   []  Ther Activities     []  Aquatics     []  Vasocompression     []  Other     Total Treatment time 46 3       Assessment: [x] Progressing toward goals. Initiated prone scapular strengthening with overall good tolerance. Pt had most difficulty this date with banded shoulder horizontal abduction on the wall; some reports of muscular fatigue following session however patient denies pain increase. Multiple cues needed throughout for proper form, questionable compliance to HEP. [] No change. [] Other:  [x] Patient would continue to benefit from skilled physical therapy services in order to: restore ROM, mobilize scar and tight musculature, and improve R shoulder strength for improved functional mobility and optimal quality of life. STG: (to be met in 6 treatments)  1. ? Pain: pt will report <3/10 right shoulder pain on average during exercises for improved tolerance to restoration of motion   2. ? ROM: pt to display the following AROM against gravity for improved ability to complete overhead tasks:  1. Flexion to 145°   2. Abduction to 140°  3. ? Function:   1. Pt to report 9 point improvement on UEFS to indicate improved functional mobility   2. Pt to report ability to brush/style hair with R hand without increased pain or significant pain for improved ability to complete ADLs  4.  Patient to be independent with home exercise program as demonstrated by performance with correct form without cues.  5. Demonstrate Knowledge of fall prevention  LTG: (to be met in 12 treatments)  1. Pt will report 1/10 or lower R shoulder pain levels with activity for improved QOL  2. Pt to demo functional and pain free right shoulder ROM for improved ability to complete ADL's   3. Pt will demo grossly 4+/5 R shoulder abduction, flexion,and IR strength for improved stability of joint and ability to carry out lifting, pushing, pulling, etc.  4. Pt will demo grossly 4/5 R scapular stabilizer strength for improved scapulothoracic rhythm during overhead movements  5. Pt will demo ability to lift 2# weight overhead onto a high cabinet without increased pain or significant difficulty for improved ease of putting groceries away    Pt. Education:  [x] Yes  [] No  [x] Reviewed Prior HEP/Ed  Method of Education: [x] Verbal  [x] Demo  [] Written  Comprehension of Education:  [x] Verbalizes understanding. [x] Demonstrates understanding. [x] Needs review. [] Demonstrates/verbalizes HEP/Ed previously given. Plan: [x] Continue current frequency toward long and short term goals.     [x] Specific Instructions for subsequent treatments: progress shoulder/scap strengthening       Time In: 6:00 pm           Time Out: 6:50 pm    Electronically signed by:  Emelia Sommer PT

## 2020-09-29 ENCOUNTER — TELEPHONE (OUTPATIENT)
Dept: ORTHOPEDIC SURGERY | Age: 75
End: 2020-09-29

## 2020-09-29 RX ORDER — AMOXICILLIN 500 MG/1
500 CAPSULE ORAL 2 TIMES DAILY
Qty: 4 CAPSULE | Refills: 0 | Status: SHIPPED | OUTPATIENT
Start: 2020-09-29 | End: 2020-10-28 | Stop reason: SDUPTHER

## 2020-09-29 NOTE — TELEPHONE ENCOUNTER
Patient is requesting a prescription for amoxicillin for dental work. Dental appointment is this afternoon. Patient uses [facility] on [address]. Patient states she is not allergic to any medications.

## 2020-10-01 ENCOUNTER — HOSPITAL ENCOUNTER (OUTPATIENT)
Dept: PHYSICAL THERAPY | Facility: CLINIC | Age: 75
Setting detail: THERAPIES SERIES
Discharge: HOME | End: 2020-10-01
Payer: MEDICARE

## 2020-10-01 PROCEDURE — 97140 MANUAL THERAPY 1/> REGIONS: CPT

## 2020-10-01 PROCEDURE — 97110 THERAPEUTIC EXERCISES: CPT

## 2020-10-01 NOTE — FLOWSHEET NOTE
[] [address]  [address]  [phone]  F: [phone] [x] [address]  [facility] 36   [address]  P: [phone]  F: [phone] [] [address]  Therapy  [address] [facility]  P: [phone]  F: [phone] [] [address]  [facility]   [address]   [place]: [phone]  F: [phone]      Physical Therapy Daily Treatment Note    Date:  10/1/2020  Patient Name:  Alexandru Jorgensen    :  1945  MRN: [id number]  Physician: Miko Frank MD                            Insurance: Medicare, Secondary  BMN  Medical Diagnosis: M52.134 (ICD-10-CM) - S/P reverse total shoulder arthroplasty, right      Rehab Codes: M25.511, M25.611  Onset Date: DOS 20                  Next 's appt: to be scheduled as needed      Visit# / total visits: 3/12; Progress note for Medicare patient due at visit 8-10     Cancels/No Shows: 0/0    Subjective:    Pain:  [x] Yes  [] No Location: R shoulder Pain Rating: (0-10 scale) 5/10  Pain altered Tx:  [] No  [] Yes  Action:  Comments: Pt states she is doing well, continues to have some anterior R shoulder pain with end range flexion and abduction.      Objective:  Modalities: pt to ice at home   Precautions: R reverse total shoulder, DOS 20  Exercises:  Exercise Reps/ Time Weight/ Level Comments   Pulleys 2/2  Flex, abd   Wand exercises 10x5\"ea  Flex, horizontal abduction, abd         Prone       Shoulder ext 10x3\" 1# Wt added 10/01   Rows 10x3\" 1# Wt added 10/01   T 10x3\"           Upper trap stretch 3x20\"       Levator stretch 3x20\"       STM and TPR over R UT, levator 8 min   Palpable trigger points noted in UT/levator, some release noted after manual             R Shld AROM 10x ea   Flexion, abduction slow and controlled    shld abduction 10x peach Through full ROM    shld flexion 10x [place]  Through full ROM    Wall push up 15x           Horizontal abd on wall 10x ea peach    SA wall slide 10x peach Added 10/01         T-band extension 15x lime                    SCAR MOBS Not today    Over surgical scar: lateral mob and pill rolling    Other:     Specific Instructions for next treatment:  progress scapular/shoulder strengthening        Treatment Charges: Mins Units   []  Modalities     [x]  Ther Exercise 40 2   [x]  Manual Therapy 8 1   []  Ther Activities     []  Aquatics     []  Vasocompression     []  Other     Total Treatment time 48 3       Assessment: [x] Progressing toward goals. Good releases of 2 trigger points noted this date during soft tissue mobilization of the R upper trap. Questionable compliance to HEP as patient requires cueing often and continues to fatigue quickly. Added SA wall slides and theraband extension this date to further strengthen shoulder/scapualar stabilizers with good tolerance. Fatigue noted at conclusion of session, no pain. Will continue to progress as tolerated. [] No change. [] Other:  [x] Patient would continue to benefit from skilled physical therapy services in order to: restore ROM, mobilize scar and tight musculature, and improve R shoulder strength for improved functional mobility and optimal quality of life. STG: (to be met in 6 treatments)  1. ? Pain: pt will report <3/10 right shoulder pain on average during exercises for improved tolerance to restoration of motion   2. ? ROM: pt to display the following AROM against gravity for improved ability to complete overhead tasks:  1. Flexion to 145°   2. Abduction to 140°  3. ? Function:   1. Pt to report 9 point improvement on UEFS to indicate improved functional mobility  2. Pt to report ability to brush/style hair with R hand without increased pain or significant pain for improved ability to complete ADLs  4.  Patient to be independent with home exercise program as demonstrated by performance with correct form without cues. 5. Demonstrate Knowledge of fall prevention  LTG: (to be met in 12 treatments)  1. Pt will report 1/10 or lower R shoulder pain levels with activity for improved QOL  2. Pt to demo functional and pain free right shoulder ROM for improved ability to complete ADL's   3. Pt will demo grossly 4+/5 R shoulder abduction, flexion,and IR strength for improved stability of joint and ability to carry out lifting, pushing, pulling, etc.  4. Pt will demo grossly 4/5 R scapular stabilizer strength for improved scapulothoracic rhythm during overhead movements  5. Pt will demo ability to lift 2# weight overhead onto a high cabinet without increased pain or significant difficulty for improved ease of putting groceries away    Pt. Education:  [x] Yes  [] No  [x] Reviewed Prior HEP/Ed  Method of Education: [x] Verbal  [x] Demo  [] Written  Comprehension of Education:  [x] Verbalizes understanding. [x] Demonstrates understanding. [x] Needs review. [] Demonstrates/verbalizes HEP/Ed previously given. Plan: [x] Continue current frequency toward long and short term goals.     [x] Specific Instructions for subsequent treatments: progress shoulder/scap strengthening       Time In: 6:28 pm           Time Out: 7:20 pm    Electronically signed by:  Randee Avelar, PT

## 2020-10-02 ENCOUNTER — APPOINTMENT (OUTPATIENT)
Dept: PHYSICAL THERAPY | Facility: CLINIC | Age: 75
End: 2020-10-02
Payer: MEDICARE

## 2020-10-08 ENCOUNTER — HOSPITAL ENCOUNTER (OUTPATIENT)
Dept: PHYSICAL THERAPY | Facility: CLINIC | Age: 75
Setting detail: THERAPIES SERIES
Discharge: HOME | End: 2020-10-08
Payer: MEDICARE

## 2020-10-08 PROCEDURE — 97110 THERAPEUTIC EXERCISES: CPT

## 2020-10-08 NOTE — FLOWSHEET NOTE
[] [facility]) [facility] &  Therapy  [address]  [phone]  F: [phone] [x] [address]  [facility]   [address]  P: [phone]  F: [phone] [] [address]  Therapy  [facility]  P: [phone]  F: [phone] [] [address]  [facility]   [address]   [place]: [phone]  F: [phone]      Physical Therapy Daily Treatment Note    Date:  10/8/2020  Patient Name:  Glenda Genao    :  1945  MRN: [id number]  Physician: Genaro Gomez MD                            Insurance: Medicare, Secondary  BMN  Medical Diagnosis: L63.944 (ICD-10-CM) - S/P reverse total shoulder arthroplasty, right      Rehab Codes: M25.511, M25.611  Onset Date: DOS 20                  Next 's appt: to be scheduled as needed      Visit# / total visits: ; Progress note for Medicare patient due at visit 8-10     Cancels/No Shows: 0/0    Subjective:    Pain:  [x] Yes  [] No Location: R shoulder Pain Rating: (0-10 scale) 5/10  Pain altered Tx:  [] No  [] Yes  Action:  Comments: Pt denies pain upon arrival, just reports her arm \"feels tired\" from doing a lot of cooking yesterday. Admits she does not complete UT and levator stretch at home.     Objective:  Modalities: pt to ice at home   Precautions: R reverse total shoulder, DOS 20  Exercises:  Exercise Reps/ Time Weight/ Level Comments   Pulleys 2/2  Flex, abd   Wand exercises 15x5\"ea 3# Flex, horizontal abduction, abd,  SA punch         Prone       Shoulder ext 10x3\" 1# Wt added 10/01   Rows 10x3\" 1# Wt added 10/01   T 10x3\"           Upper trap stretch 3x20\"       Levator stretch 3x20\"       STM and TPR over R UT, levator NOT TODAY   Palpable trigger points noted in UT/levator, some release noted after manual             R Shld AROM 10x ea   Flexion, abduction slow and controlled    shld abduction 10x peach Through full ROM    shld flexion 10x [place]  Through full ROM    Wall push up 15x           Horizontal abd on wall 10x ea peach    SA wall slide 10x peach Added 10/01   Ball circles-shld stability 15x ea Play ball Added 10/08         T-band extension 15x lime              SCAR MOBS Not today    Over surgical scar: lateral mob and pill rolling    Other:     Specific Instructions for next treatment:  progress scapular/shoulder strengthening        Treatment Charges: Mins Units   []  Modalities     [x]  Ther Exercise 50 3   [x]  Manual Therapy     []  Ther Activities     []  Aquatics     []  Vasocompression     []  Other     Total Treatment time 50 3   Estimated cost of care to date 10/08/20: $353.18    Assessment: [x] Progressing toward goals. Improved shoulder strength and endurance noted this date as indicated by patient requiring less rest breaks and completing increased exercises. Pt able to achieve full shoulder abduction ROM in prone without assistance indicating improved posterior deltoid strength; will continue to progress as tolerated. Fatigue noted at end of session, no reports of pain. [] No change. [] Other:  [x] Patient would continue to benefit from skilled physical therapy services in order to: restore ROM, mobilize scar and tight musculature, and improve R shoulder strength for improved functional mobility and optimal quality of life. STG: (to be met in 6 treatments)  1. ? Pain: pt will report <3/10 right shoulder pain on average during exercises for improved tolerance to restoration of motion   2. ? ROM: pt to display the following AROM against gravity for improved ability to complete overhead tasks:  1. Flexion to 145°   2. Abduction to 140°  3. ? Function:   1. Pt to report 9 point improvement on UEFS to indicate improved functional mobility  2.  Pt to report ability to brush/style hair with R hand without increased pain or significant pain for improved ability to complete ADLs  4. Patient to be independent with home exercise program as demonstrated by performance with correct form without cues. 5. Demonstrate Knowledge of fall prevention  LTG: (to be met in 12 treatments)  1. Pt will report 1/10 or lower R shoulder pain levels with activity for improved QOL  2. Pt to demo functional and pain free right shoulder ROM for improved ability to complete ADL's   3. Pt will demo grossly 4+/5 R shoulder abduction, flexion,and IR strength for improved stability of joint and ability to carry out lifting, pushing, pulling, etc.  4. Pt will demo grossly 4/5 R scapular stabilizer strength for improved scapulothoracic rhythm during overhead movements  5. Pt will demo ability to lift 2# weight overhead onto a high cabinet without increased pain or significant difficulty for improved ease of putting groceries away    Pt. Education:  [x] Yes  [] No  [x] Reviewed Prior HEP/Ed  Method of Education: [x] Verbal  [x] Demo  [] Written  Comprehension of Education:  [x] Verbalizes understanding. [x] Demonstrates understanding. [x] Needs review. [] Demonstrates/verbalizes HEP/Ed previously given. Plan: [x] Continue current frequency toward long and short term goals.     [x] Specific Instructions for subsequent treatments: progress shoulder/scap strengthening       Time In: 3:00 pm           Time Out: 3:58 pm    Electronically signed by:  Yomaira Soriano PT

## 2020-10-13 ENCOUNTER — HOSPITAL ENCOUNTER (OUTPATIENT)
Dept: PHYSICAL THERAPY | Facility: CLINIC | Age: 75
Setting detail: THERAPIES SERIES
Discharge: HOME | End: 2020-10-13
Payer: MEDICARE

## 2020-10-13 PROCEDURE — 97110 THERAPEUTIC EXERCISES: CPT

## 2020-10-13 NOTE — FLOWSHEET NOTE
[] [facility]) [facility] &  Therapy  [address]  [phone]  F: [phone] [x] [address]  [facility]"" 36   [address]  P: [phone]  F: [phone] [] [address]  Therapy  [address] [facility]  P: [phone]  F: [phone] [] [address]  [facility]   [address]   [place]: [phone]  F: [phone]      Physical Therapy Daily Treatment Note    Date:  10/13/2020  Patient Name:  Danelle Steward    :  1945  MRN: [id number]  Physician: Sukhjinder Guerrier MD                            Insurance: Medicare, Secondary  BMN  Medical Diagnosis: D21.617 (ICD-10-CM) - S/P reverse total shoulder arthroplasty, right      Rehab Codes: M25.511, M25.611  Onset Date: DOS 20                  Next 's appt: to be scheduled as needed      Visit# / total visits: ; Progress note for Medicare patient due at visit 8-10     Cancels/No Shows: 0/0    Subjective:    Pain:  [x] Yes  [] No Location: R shoulder Pain Rating: (0-10 scale) 0/10  Pain altered Tx:  [] No  [] Yes  Action:  Comments: Pt states she cooked/baked al day yesterday and did not wake up sore this morning.      Objective:  Modalities: pt to ice at home   Precautions: R reverse total shoulder, DOS 20  Exercises:  Exercise Reps/ Time Weight/ Level Comments   Pulleys 2/2  Flex, abd   Wand exercises 15x5\"ea 3# Flex, horizontal abduction, abd, SA punch, chest press         Prone       Shoulder ext 15x3\" 1# Wt added 10/01   Rows 15x3\" 1# Wt added 10/01   T 15x3\"           Upper trap stretch 3x20\"  HEP     Levator stretch 3x20\"  HEP     STM and TPR over R UT, levator NOT TODAY   Palpable trigger points noted in UT/levator, some release noted after manual             R Shld AROM 10x ea   Flexion, abduction slow and controlled through full ROM    shld abduction 10x peach Through full ROM    shld flexion 10x [place]  Through full ROM    Wall push up 15x           *Horizontal abd on wall 10x ea peach    *SA wall slide 10x peach Added 10/01   *Ball circles-shld stability 15x ea Play ball Added 10/08         T-band extension 15x lime Added 10/13   T-band row 15x lime Added 10/13   T-band high row 15x lime Added 10/13             SCAR MOBS Not today    Over surgical scar: lateral mob and pill rolling    Other:     Specific Instructions for next treatment:  progress scapular/shoulder strengthening   PROGRESS NOTE        Treatment Charges: Mins Units   []  Modalities     [x]  Ther Exercise 50 3   [x]  Manual Therapy     []  Ther Activities     []  Aquatics     []  Vasocompression     []  Other     Total Treatment time 50 3   Estimated cost of care to date 10/13/20: $1,619.26    Assessment: [x] Progressing toward goals. Completed more challenging exercises first after mobility this date with improved tolerance and form (starred more difficult exercises to complete first in chart above). No pain reported throughout, just muscular fatigue. Pt to ice at home. [] No change. [] Other:  [x] Patient would continue to benefit from skilled physical therapy services in order to: restore ROM, mobilize scar and tight musculature, and improve R shoulder strength for improved functional mobility and optimal quality of life. STG: (to be met in 6 treatments)  1. ? Pain: pt will report <3/10 right shoulder pain on average during exercises for improved tolerance to restoration of motion   2. ? ROM: pt to display the following AROM against gravity for improved ability to complete overhead tasks:  1. Flexion to 145°   2. Abduction to 140°  3. ? Function:   1. Pt to report 9 point improvement on UEFS to indicate improved functional mobility  2.  Pt to report ability to brush/style hair with R hand without increased pain or significant pain for improved ability to complete ADLs  4. Patient to be independent with home exercise program as demonstrated by performance with correct form without cues. 5. Demonstrate Knowledge of fall prevention  LTG: (to be met in 12 treatments)  1. Pt will report 1/10 or lower R shoulder pain levels with activity for improved QOL  2. Pt to demo functional and pain free right shoulder ROM for improved ability to complete ADL's   3. Pt will demo grossly 4+/5 R shoulder abduction, flexion,and IR strength for improved stability of joint and ability to carry out lifting, pushing, pulling, etc.  4. Pt will demo grossly 4/5 R scapular stabilizer strength for improved scapulothoracic rhythm during overhead movements  5. Pt will demo ability to lift 2# weight overhead onto a high cabinet without increased pain or significant difficulty for improved ease of putting groceries away    Pt. Education:  [x] Yes  [] No  [x] Reviewed Prior HEP/Ed  Method of Education: [x] Verbal  [x] Demo  [] Written  Comprehension of Education:  [x] Verbalizes understanding. [x] Demonstrates understanding. [x] Needs review. [] Demonstrates/verbalizes HEP/Ed previously given. Plan: [x] Continue current frequency toward long and short term goals.     [x] Specific Instructions for subsequent treatments: progress shoulder/scap strengthening       Time In: 6:00 pm           Time Out: 6:58 pm    Electronically signed by:  Brittany Gan, PT

## 2020-10-20 ENCOUNTER — HOSPITAL ENCOUNTER (OUTPATIENT)
Dept: PHYSICAL THERAPY | Facility: CLINIC | Age: 75
Setting detail: THERAPIES SERIES
Discharge: HOME | End: 2020-10-20
Payer: MEDICARE

## 2020-10-20 PROCEDURE — 97110 THERAPEUTIC EXERCISES: CPT

## 2020-10-20 NOTE — FLOWSHEET NOTE
[] [facility]) [facility] &  Therapy  [address]  [phone]  F: [phone] [x] [address]  [place] [address]   [address]  P: [phone]  F: [phone] [] [address]  Therapy  [address] [facility]  P: [phone]  F: [phone] [] [address]  [facility]   [address]   [place]: [phone]  F: [phone]      Physical Therapy Daily Treatment Note    Date:  10/20/2020  Patient Name:  Pierre Meeks    :  1945  MRN: [id number]  Physician: Tito Sanchez MD                            Insurance: Medicare, Secondary  BMN  Medical Diagnosis: Z23.738 (ICD-10-CM) - S/P reverse total shoulder arthroplasty, right      Rehab Codes: M25.511, M25.611  Onset Date: DOS 20                  Next 's appt: to be scheduled as needed   Visit# / total visits: ; Progress note for Medicare patient due at visit 8-10     Cancels/No Shows: 0/0    Subjective:    Pain:  [x] Yes  [] No Location: R shoulder Pain Rating: (0-10 scale) 0/10  Pain altered Tx:  [] No  [] Yes  Action:  Comments: Pt states she is feeling good today, denies recent shoulder pain. Pt states she is still unable to brush her hair with the RUE.      Objective:  Modalities: cold pack applied to R shoulder in semi recumbent position x 10 minutes   Precautions: R reverse total shoulder, DOS 20  Exercises:  Exercise Reps/ Time Weight/ Level Comments   Pulleys 2/2  Flex, abd   Wand exercises 15x5\"ea 3# Flex, horizontal abduction, abd, SA punch, chest press         Prone       Shoulder ext 15x3\" 2# Wt progressed 10/20   Rows 15x3\" 2# Wt progressed 10/20   T 15x3\" 1# Wt progressed 10/20         Upper trap stretch 3x20\"  HEP     Levator stretch 3x20\"  HEP     STM and TPR over R UT, levator NOT TODAY   Palpable trigger points noted in UT/levator, some release noted after manual             R Shld AROM 10x ea   Flexion, abduction slow and controlled through full ROM    shld abduction 10x [place], 1# Added DBs 10/20   shld flexion 10x [place], 1#  Added DBs 10/20   Wall push up 15x peach Added band 10/20         *Horizontal abd on wall 10x ea peach    *Ball circles-shld stability 15x ea Play ball Added 10/08         T-band extension 15x lime Added 10/13   T-band row 15x lime Added 10/13   T-band high row 15x lime Added 10/13             SCAR MOBS Not today    Over surgical scar: lateral mob and pill rolling    Other:     Specific Instructions for next treatment:         Treatment Charges: Mins Units   [x]  Modalities: ice pack 10 0   [x]  Ther Exercise 50 3   [x]  Manual Therapy     []  Ther Activities     []  Aquatics     []  Vasocompression     []  Other     Total Treatment time 50 3   Estimated cost of care to date 10/20/20: $0,459.20    Assessment: [x] Progressing toward goals. Pt reports 0/10 R shoulder pain at rest, \"minimal discomfort\" with higher level strengthening exercises. AROM has minimally improved however strength in R shoulder at patient's end ranges has vastly improved (as demonstrated by ability to lift 1# weight multiple times to shoulder height and above). Will continue to progress scapular and shoulder strengthening as tolerated for the next 6 visits. [] No change. [] Other:  [x] Patient would continue to benefit from skilled physical therapy services in order to: restore ROM, mobilize scar and tight musculature, and improve R shoulder strength for improved functional mobility and optimal quality of life. STG: (to be met in 6 treatments)  1. ? Pain: pt will report <3/10 right shoulder pain on average during exercises for improved tolerance to restoration of motion- MET, 0/10 average    2. ? ROM: pt to display the following AROM against gravity for improved ability to complete overhead tasks:  1.  Flexion to 140°- Ongoing, 138°    2. Abduction to 140°-Ongoing, 134 °   3. ? Function:   1. Pt to report 9 point improvement on UEFS to indicate improved functional mobility-ONGOING  2. Pt to report ability to brush/style hair with R hand without increased pain or significant pain for improved ability to complete ADLs- NOT MET  4. Patient to be independent with home exercise program as demonstrated by performance with correct form without cues. -MET  5. Demonstrate Knowledge of fall prevention-MET  LTG: (to be met in 12 treatments)  1. Pt will report 1/10 or lower R shoulder pain levels with activity for improved QOL  2. Pt to demo functional and pain free right shoulder ROM for improved ability to complete ADL's   3. Pt will demo grossly 4+/5 R shoulder abduction, flexion,and IR strength for improved stability of joint and ability to carry out lifting, pushing, pulling, etc.  4. Pt will demo grossly 4/5 R scapular stabilizer strength for improved scapulothoracic rhythm during overhead movements  5. Pt will demo ability to lift 2# weight overhead onto a high cabinet without increased pain or significant difficulty for improved ease of putting groceries away    Pt. Education:  [x] Yes  [] No  [x] Reviewed Prior HEP/Ed  Method of Education: [x] Verbal  [x] Demo  [] Written  Comprehension of Education:  [x] Verbalizes understanding. [x] Demonstrates understanding. [x] Needs review. [] Demonstrates/verbalizes HEP/Ed previously given. Plan: [x] Continue current frequency toward long and short term goals.     [x] Specific Instructions for subsequent treatments: progress shoulder/scap strengthening       Time In: 6:00 pm           Time Out: 7:00 pm    Electronically signed by:  Lise Montano PT

## 2020-10-20 NOTE — PROGRESS NOTES
[] [facility]) [facility] &  Therapy  [address]  [phone]  F: [phone] [x] [address]  [facility]   [address]  P: [phone]  F: [phone] [] [address] &  Therapy  [address]  P: [phone]  F: [phone] [] [address]  P: [phone]  F: [phone] [] [address]  [facility]   [address]   [place]: [phone]  F: [phone]      Physical Therapy Progress Note    Date: 10/20/2020      Patient: Aníbal Viramontes  : 1945  MRN: [id number]    Partha Menard MD                            Insurance: Medicare, Secondary  BMN  Medical Diagnosis: Z96.611 (ICD-10-CM) - S/P reverse total shoulder arthroplasty, right      Rehab Codes: M25.511, M25.611  Onset Date: DOS 20                  Next 's appt: to be scheduled as needed   Visit# / total visits: ; Progress note for Medicare patient due at visit 8-10         Cancels/No shows: 0  Date range of services: 20 to 10/20/20       Subjective:    Pain:  [x]? Yes  []? No   Location: R shoulder   Pain Rating: (0-10 scale) 0/10  Pain altered Tx:  []? No  [x]? Yes  Action:  Comments: Pt states she is feeling good today, denies recent shoulder pain. Pt states she is still unable to brush her hair with the RUE due to ROM limitations. Assessment:  [x]? Progressing toward goals. Pt reports 0/10 R shoulder pain at rest, \"minimal discomfort\" with higher level strengthening exercises. AROM has minimally improved however strength in R shoulder at patient's end ranges has vastly improved (as demonstrated by ability to lift 1# weight multiple times to shoulder height and above).  Will continue to progress scapular and shoulder strengthening as tolerated for the next 6 visits. []? No change. []? Other:  [x]? Patient would continue to benefit from skilled physical therapy services in order to: restore ROM, mobilize scar and tight musculature, and improve R shoulder strength for improved functional mobility and optimal quality of life.     STG: (to be met in 6 treatments)  1. ? Pain: pt will report <3/10 right shoulder pain on average during exercises for improved tolerance to restoration of motion- MET, 0/10 average    2. ? ROM: pt to display the following AROM against gravity for improved ability to complete overhead tasks:  1. Flexion to 140°- Ongoing, 138°    2. Abduction to 140°-Ongoing, 134 °   3. ? Function:   1. Pt to report 9 point improvement on UEFS to indicate improved functional mobility-ONGOING  2. Pt to report ability to brush/style hair with R hand without increased pain or significant pain for improved ability to complete ADLs- NOT MET  4. Patient to be independent with home exercise program as demonstrated by performance with correct form without cues. -MET  5. Demonstrate Knowledge of fall prevention-MET  LTG: (to be met in 12 treatments)  1. Pt will report 1/10 or lower R shoulder pain levels with activity for improved QOL  2. Pt to demo functional and pain free right shoulder ROM for improved ability to complete ADL's   3. Pt will demo grossly 4+/5 R shoulder abduction, flexion,and IR strength for improved stability of joint and ability to carry out lifting, pushing, pulling, etc.  4. Pt will demo grossly 4/5 R scapular stabilizer strength for improved scapulothoracic rhythm during overhead movements  5.  Pt will demo ability to lift 2# weight overhead onto a high cabinet without increased pain or significant difficulty for improved ease of putting groceries away       Treatment Plan:  [x] Therapeutic Exercise   21297  [] Iontophoresis: 4 mg/mL Dexamethasone Sodium Phosphate  Elissa  [id number]   [x] Therapeutic Activity  61713 [] Vasopneumatic cold with compression  98070    [] Gait Training   [id number] [] Ultrasound   [id number]   [x] Neuromuscular Re-education  79911 [] Electrical Stimulation Unattended  99593   [x] Manual Therapy  40764 [] Electrical Stimulation Attended  [id number]   [x] Instruction in HEP  [] Lumbar/Cervical Traction  [id number]   [] Aquatic Therapy   39341 [x] Cold/hotpack    [] Massage   84196      [] Dry Needling, 1 or 2 muscles  86872   [] Biofeedback, first 15 minutes   83008  [] Biofeedback, additional 15 minutes   04052 [] Dry Needling, 3 or more muscles  21769       Patient Status:     [x] Continue per initial plan of care. [] Additional visits necessary. [] Other:       Electronically signed by Judi De PT on 10/20/2020 at 7:14 PM      If you have any questions or concerns, please don't hesitate to call. Thank you for your referral.    Physician Signature:________________________________Date:__________________  By signing above or cosigning this note, I have reviewed this plan of care and certify a need for medically necessary rehabilitation services.      *PLEASE SIGN ABOVE AND FAX BACK ALL PAGES*

## 2020-10-27 ENCOUNTER — HOSPITAL ENCOUNTER (OUTPATIENT)
Dept: PHYSICAL THERAPY | Facility: CLINIC | Age: 75
Setting detail: THERAPIES SERIES
Discharge: HOME | End: 2020-10-27
Payer: MEDICARE

## 2020-10-27 PROCEDURE — 97110 THERAPEUTIC EXERCISES: CPT

## 2020-10-27 NOTE — FLOWSHEET NOTE
[] [facility]) [facility] &  Therapy  [address]  [phone]  F: [phone] [x] [address]  [facility]   [address]  P: [phone]  F: [phone] [] [address]  Therapy  [address] [facility]  P: [phone]  F: [phone] [] [address]  [facility]   [address]   [place]: [phone]  F: [phone]      Physical Therapy Daily Treatment Note    Date:  10/27/2020  Patient Name:  Laura Bonner    :  1945  MRN: [id number]  Physician: Tara Cooper MD                            Insurance: Medicare, Secondary  BMN  Medical Diagnosis: U46.812 (ICD-10-CM) - S/P reverse total shoulder arthroplasty, right      Rehab Codes: M25.511, M25.611  Onset Date: DOS 20                  Next 's appt: to be scheduled as needed   Visit# / total visits: ; Progress note for Medicare patient due at visit 8-10   Cancels/No Shows: 0/0    Subjective:    Pain:  [x] Yes  [] No Location: R shoulder Pain Rating: (0-10 scale) 0/10  Pain altered Tx:  [] No  [] Yes  Action:  Comments: Pt states she did yard work yesterday and was sore last night afterwards however denies issues today.      Objective:  Modalities: cold pack applied to R shoulder in semi recumbent position x 10 minutes- NOT TODAY    Precautions: R reverse total shoulder, DOS 20  Exercises:  Exercise Reps/ Time Weight/ Level Comments   Pulleys 2/2  Flex, abd   Wand exercises 15x5\"ea 3# Flex, horizontal abduction, abd, SA punch, chest press         Prone       Shoulder ext 15x3\" 2# Wt progressed 10/20   Rows 15x3\" 2# Wt progressed 10/20   Mid trap row (90 flex) 15x A Added 10/27, therapist assist at [address] 15x3\" 1# Wt progressed 10/20         Upper trap stretch 3x20\"  HEP     Levator stretch 3x20\"  HEP     STM and TPR over R UT, levator 6 min   Palpable trigger points noted in UT/levator, some release noted after manual             R Shld AROM 10x ea   Flexion, abduction slow and controlled through full ROM    shld abduction 10x [place], 1# Added DBs 10/20   shld flexion 10x [place], 1#  Added DBs 10/20   Wall push up 15x peach Added band 10/20         *Horizontal abd on wall 15x ea peach Progressed reps 10/27   *Ball circles-shld stability 15x ea White t-ball Progressed 10/27         T-band extension 15x lime Added 10/13   T-band row 15x blue Progressed 10/27   T-band high row 15x lime Added 10/13             SCAR MOBS Not today    Over surgical scar: lateral mob and pill rolling    Other:     Specific Instructions for next treatment:         Treatment Charges: Mins Units   []  Modalities: ice pack     [x]  Ther Exercise 40 3   [x]  Manual Therapy 6 --   []  Ther Activities     []  Aquatics     []  Vasocompression     []  Other     Total Treatment time 46 3   Estimated cost of care to date 10/27/20: $1,771. 58    Assessment: [x] Progressing toward goals. Able to progress some theraband strengthening exercises without discomfort reported this date however patient does admit to muscular fatigue. Completed manual work this date as patient reports increased upper trap tightness and did observe to compensate with upper trap recruitment during lateral raises; pt reports good relief following manual. Will progress as tolerated     [] No change. [] Other:  [x] Patient would continue to benefit from skilled physical therapy services in order to: restore ROM, mobilize scar and tight musculature, and improve R shoulder strength for improved functional mobility and optimal quality of life.     STG: (to be met in 6 treatments)  1. ? Pain: pt will report <3/10 right shoulder pain on average during exercises for improved tolerance to restoration of motion- MET, 0/10 average    2. ? ROM: pt to display the following AROM against gravity for improved ability to complete overhead tasks:  1. Flexion to 140°- Ongoing, 138°    2. Abduction to 140°-Ongoing, 134 °   3. ? Function:   1. Pt to report 9 point improvement on UEFS to indicate improved functional mobility-ONGOING  2. Pt to report ability to brush/style hair with R hand without increased pain or significant pain for improved ability to complete ADLs- NOT MET  4. Patient to be independent with home exercise program as demonstrated by performance with correct form without cues. -MET  5. Demonstrate Knowledge of fall prevention-MET  LTG: (to be met in 12 treatments)  1. Pt will report 1/10 or lower R shoulder pain levels with activity for improved QOL  2. Pt to demo functional and pain free right shoulder ROM for improved ability to complete ADL's   3. Pt will demo grossly 4+/5 R shoulder abduction, flexion,and IR strength for improved stability of joint and ability to carry out lifting, pushing, pulling, etc.  4. Pt will demo grossly 4/5 R scapular stabilizer strength for improved scapulothoracic rhythm during overhead movements  5. Pt will demo ability to lift 2# weight overhead onto a high cabinet without increased pain or significant difficulty for improved ease of putting groceries away    Pt. Education:  [x] Yes  [] No  [x] Reviewed Prior HEP/Ed  Method of Education: [x] Verbal  [x] Demo  [] Written  Comprehension of Education:  [x] Verbalizes understanding. [x] Demonstrates understanding. [x] Needs review. [] Demonstrates/verbalizes HEP/Ed previously given. Plan: [x] Continue current frequency toward long and short term goals.     [x] Specific Instructions for subsequent treatments: progress shoulder/scap strengthening       Time In: 3:00 pm           Time Out: 3:50 pm    Electronically signed by:  Prince Baez, PT

## 2020-10-28 ENCOUNTER — TELEPHONE (OUTPATIENT)
Dept: ORTHOPEDIC SURGERY | Age: 75
End: 2020-10-28

## 2020-10-28 RX ORDER — AMOXICILLIN 500 MG/1
500 CAPSULE ORAL 2 TIMES DAILY
Qty: 4 CAPSULE | Refills: 0 | Status: SHIPPED | OUTPATIENT
Start: 2020-10-28 | End: 2021-08-09 | Stop reason: SDUPTHER

## 2020-10-28 NOTE — TELEPHONE ENCOUNTER
Patient says that she is having dental procedures on Tuesday, November 3, 2020 and she needs a prescription for an antibiotic called to [facility] in [facility] on [address] She needs to take an antibiotic prior to the dental procedures.

## 2020-11-03 ENCOUNTER — HOSPITAL ENCOUNTER (OUTPATIENT)
Dept: PHYSICAL THERAPY | Facility: CLINIC | Age: 75
Setting detail: THERAPIES SERIES
Discharge: HOME | End: 2020-11-03
Payer: MEDICARE

## 2020-11-03 PROCEDURE — 97110 THERAPEUTIC EXERCISES: CPT

## 2020-11-03 PROCEDURE — 97140 MANUAL THERAPY 1/> REGIONS: CPT

## 2020-11-03 NOTE — FLOWSHEET NOTE
[] [address] [facility]  [facility]  [address]  [phone]  F: [phone] [x] [address]  [facility]   [address]  P: [phone]  F: [phone] [] [address]  Therapy  [address] [facility]  P: [phone]  F: [phone] [] [address]  [facility]   [address]   [place]: [phone]  F: [phone]      Physical Therapy Daily Treatment Note    Date:  11/3/2020  Patient Name:  Cherise Bell    :  1945  MRN: [id number]  Physician: Sebastian Chirinos MD                            Insurance: Medicare, Secondary  BMN  Medical Diagnosis: V78.554 (ICD-10-CM) - S/P reverse total shoulder arthroplasty, right      Rehab Codes: M25.511, M25.611  Onset Date: DOS 20                  Next 's appt: to be scheduled as needed   Visit# / total visits: ; Progress note for Medicare patient due at visit 17   Cancels/No Shows: 0/0    Subjective:    Pain:  [x] Yes  [] No Location: R shoulder Pain Rating: (0-10 scale) 0/10  Pain altered Tx:  [] No  [] Yes  Action:  Comments: Pt states she cooked a turkey and multiple thanksgiving sides last night and woke up slightly sore this morning due to all of the lifting, stirring, etc required.      Objective:  Modalities: cold pack applied to R shoulder in semi recumbent position x 10 minutes- NOT TODAY    Precautions: R reverse total shoulder, DOS 20  Exercises:  Exercise Reps/ Time Weight/ Level Comments   Pulleys 2/2  Flex, abd   Wand exercises 15x5\"ea 3# Flex, horizontal abduction, abd, SA punch, chest press         Prone       Shoulder ext 15x3\" 2# Wt progressed 10/   Rows 15x3\" 2# Wt progressed 10/20   Mid trap row (90 flex) 15x A Added 10/27, therapist assist at [address] 15x3\" 1# Wt progressed 10/20         Upper trap stretch 3x20\"  HEP   <3/10 right shoulder pain on average during exercises for improved tolerance to restoration of motion- MET, 0/10 average    2. ? ROM: pt to display the following AROM against gravity for improved ability to complete overhead tasks:  1. Flexion to 140°- Ongoing, 138°    2. Abduction to 140°-Ongoing, 134 °   3. ? Function:   1. Pt to report 9 point improvement on UEFS to indicate improved functional mobility-ONGOING  2. Pt to report ability to brush/style hair with R hand without increased pain or significant pain for improved ability to complete ADLs- NOT MET  4. Patient to be independent with home exercise program as demonstrated by performance with correct form without cues. -MET  5. Demonstrate Knowledge of fall prevention-MET  LTG: (to be met in 12 treatments)  1. Pt will report 1/10 or lower R shoulder pain levels with activity for improved QOL  2. Pt to demo functional and pain free right shoulder ROM for improved ability to complete ADL's   3. Pt will demo grossly 4+/5 R shoulder abduction, flexion,and IR strength for improved stability of joint and ability to carry out lifting, pushing, pulling, etc.  4. Pt will demo grossly 4/5 R scapular stabilizer strength for improved scapulothoracic rhythm during overhead movements  5. Pt will demo ability to lift 2# weight overhead onto a high cabinet without increased pain or significant difficulty for improved ease of putting groceries away    Pt. Education:  [x] Yes  [] No  [x] Reviewed Prior HEP/Ed  Method of Education: [x] Verbal  [x] Demo  [] Written  Comprehension of Education:  [x] Verbalizes understanding. [x] Demonstrates understanding. [x] Needs review. [] Demonstrates/verbalizes HEP/Ed previously given. Plan: [x] Continue current frequency toward long and short term goals.     [x] Specific Instructions for subsequent treatments: progress shoulder/scap strengthening       Time In: 4:15 pm           Time Out: 5:05 pm    Electronically signed by: Wenceslao Roche, PT

## 2020-11-10 ENCOUNTER — HOSPITAL ENCOUNTER (OUTPATIENT)
Dept: PHYSICAL THERAPY | Facility: CLINIC | Age: 75
Setting detail: THERAPIES SERIES
Discharge: HOME | End: 2020-11-10
Payer: MEDICARE

## 2020-11-10 PROCEDURE — 97110 THERAPEUTIC EXERCISES: CPT

## 2020-11-10 PROCEDURE — 97140 MANUAL THERAPY 1/> REGIONS: CPT

## 2020-11-10 NOTE — FLOWSHEET NOTE
trap stretch 3x20\"  HEP     Levator stretch 3x20\"  HEP     STM and TPR over R UT, levator 8 min   Palpable trigger points noted in R UT/levator, some release noted after manual             R Shld AROM 10x ea   Flexion, abduction slow and controlled through full ROM    shld abduction 10x [place], 1# Added DBs 10/20   shld flexion 10x [place], 1#  Added DBs 10/20   Wall push up 15x pe[place] Added band 10/20         *Horizontal abd on wall 15x ea peach Progressed reps 10/27   *Ball circles-shld stability 15x ea White t-ball Progressed 10/27         T-band   Held t-band ex 11/10   extension 15x blue Progressed 11/3   row 15x blue Progressed 10/27   high row 15x blule Progressed 11/3             SCAR MOBS Not today    Over surgical scar: lateral mob and pill rolling    Other:     Specific Instructions for next treatment:  Continue to progress strengthening        Treatment Charges: Mins Units   []  Modalities: ice pack     [x]  Ther Exercise 37 2   [x]  Manual Therapy 8 1   []  Ther Activities     []  Aquatics     []  Vasocompression     []  Other     Total Treatment time 45 3   Estimated cost of care to date 11/10/20: $1,920.86    Assessment: [x] Progressing toward goals. Added side lying shoulder strengthening this date without incident. Pt reports significant R shoulder fatigue and soreness midway through standing shoulder exercises, thus held theraband exercises this date. Pt reports some relief of soreness following manual work. Will progress as tolerated. [] No change. [] Other:  [x] Patient would continue to benefit from skilled physical therapy services in order to: restore ROM, mobilize scar and tight musculature, and improve R shoulder strength for improved functional mobility and optimal quality of life.     STG: (to be met in 6 treatments)  1. ? Pain: pt will report <3/10 right shoulder pain on average during exercises for improved tolerance to restoration of motion- MET, 0/10 average    2. ? ROM: pt to display the following AROM against gravity for improved ability to complete overhead tasks:  1. Flexion to 140°- Ongoing, 138°    2. Abduction to 140°-Ongoing, 134 °   3. ? Function:   1. Pt to report 9 point improvement on UEFS to indicate improved functional mobility-ONGOING  2. Pt to report ability to brush/style hair with R hand without increased pain or significant pain for improved ability to complete ADLs- NOT MET  4. Patient to be independent with home exercise program as demonstrated by performance with correct form without cues. -MET  5. Demonstrate Knowledge of fall prevention-MET  LTG: (to be met in 12 treatments)  1. Pt will report 1/10 or lower R shoulder pain levels with activity for improved QOL  2. Pt to demo functional and pain free right shoulder ROM for improved ability to complete ADL's   3. Pt will demo grossly 4+/5 R shoulder abduction, flexion,and IR strength for improved stability of joint and ability to carry out lifting, pushing, pulling, etc.  4. Pt will demo grossly 4/5 R scapular stabilizer strength for improved scapulothoracic rhythm during overhead movements  5. Pt will demo ability to lift 2# weight overhead onto a high cabinet without increased pain or significant difficulty for improved ease of putting groceries away    Pt. Education:  [] Yes  [] No  [x] Reviewed Prior HEP/Ed  Method of Education: [] Verbal  [] Demo  [] Written  Comprehension of Education:  [] Verbalizes understanding. [] Demonstrates understanding. [] Needs review. [x] Demonstrates/verbalizes HEP/Ed previously given. Plan: [x] Continue current frequency toward long and short term goals.     [x] Specific Instructions for subsequent treatments: progress shoulder/scap strengthening       Time In: 2:00 pm           Time Out: 2:50 pm    Electronically signed by:  Jennelle Barthel, PT

## 2020-11-17 ENCOUNTER — HOSPITAL ENCOUNTER (OUTPATIENT)
Dept: PHYSICAL THERAPY | Facility: CLINIC | Age: 75
Setting detail: THERAPIES SERIES
Discharge: HOME | End: 2020-11-17
Payer: MEDICARE

## 2020-11-17 PROCEDURE — 97140 MANUAL THERAPY 1/> REGIONS: CPT

## 2020-11-17 PROCEDURE — 97110 THERAPEUTIC EXERCISES: CPT

## 2020-11-17 PROCEDURE — 97016 VASOPNEUMATIC DEVICE THERAPY: CPT

## 2020-11-17 NOTE — FLOWSHEET NOTE
[] [facility]) [facility] &  Therapy  [address]  [phone]  F: [phone] [x] [address]  [facility] 36   [address]  P: [phone]  F: [phone] [] [address] &  Therapy  [address] [facility]  P: [phone]  F: [phone] [] [address]  [facility]   [address]   [place]: [phone]  F: [phone]      Physical Therapy Daily Treatment Note    Date:  2020  Patient Name:  Je Sanford    :  1945  MRN: [id number]  Physician: Rodrigo Mathias MD                            Insurance: Medicare, Secondary  BMN  Medical Diagnosis: N81.436 (ICD-10-CM) - S/P reverse total shoulder arthroplasty, right      Rehab Codes: M25.511, M25.611  Onset Date: DOS 20                  Next 's appt: to be scheduled as needed   Visit# / total visits: 10/12; Progress note for Medicare patient due at visit 17   Cancels/No Shows: 0/0    Subjective:    Pain:  [x] Yes  [] No Location: R shoulder Pain Rating: (0-10 scale) 0/10  Pain altered Tx:  [] No  [] Yes  Action:  Comments: Pt states she has been doing a lot of baking and quilting, R shoulder feels tight and tired today.       Objective:  Modalities: cold pack applied to R shoulder in semi recumbent position x 10 minutes- NOT TODAY    Precautions: R reverse total shoulder, DOS 20  Exercises:  Exercise Reps/ Time Weight/ Level Comments   UBE 2/2  L1 added 11/10   Pulleys 2/2 NOT TODAY Flex, abd   Wand exercises 15x5\"ea 3# Flex, horizontal abduction, abd, SA punch, chest press         Prone       Shoulder ext 15 2#    Rows 10x 3# Wt progressed    Mid trap row (90 flex) 15x 1# Wt added    T 15x3\" 1#          Side lying       abduction 15x 2#    Hor abduction 15x A          Upper trap stretch 3x20\"  HEP     Levator stretch 3x20\" tolerance to restoration of motion- MET, 0/10 average    2. ? ROM: pt to display the following AROM against gravity for improved ability to complete overhead tasks:  1. Flexion to 140°- Ongoing, 138°    2. Abduction to 140°-Ongoing, 134 °   3. ? Function:   1. Pt to report 9 point improvement on UEFS to indicate improved functional mobility-ONGOING  2. Pt to report ability to brush/style hair with R hand without increased pain or significant pain for improved ability to complete ADLs- NOT MET  4. Patient to be independent with home exercise program as demonstrated by performance with correct form without cues. -MET  5. Demonstrate Knowledge of fall prevention-MET  LTG: (to be met in 12 treatments)  1. Pt will report 1/10 or lower R shoulder pain levels with activity for improved QOL  2. Pt to demo functional and pain free right shoulder ROM for improved ability to complete ADL's   3. Pt will demo grossly 4+/5 R shoulder abduction, flexion,and IR strength for improved stability of joint and ability to carry out lifting, pushing, pulling, etc.  4. Pt will demo grossly 4/5 R scapular stabilizer strength for improved scapulothoracic rhythm during overhead movements  5. Pt will demo ability to lift 2# weight overhead onto a high cabinet without increased pain or significant difficulty for improved ease of putting groceries away    Pt. Education:  [] Yes  [] No  [x] Reviewed Prior HEP/Ed  Method of Education: [] Verbal  [] Demo  [] Written  Comprehension of Education:  [] Verbalizes understanding. [] Demonstrates understanding. [] Needs review. [x] Demonstrates/verbalizes HEP/Ed previously given. Plan: [x] Continue current frequency toward long and short term goals.     [x] Specific Instructions for subsequent treatments: progress shoulder/scap strengthening       Time In: 6:00 pm           Time Out: 7:00 pm    Electronically signed by:  Hal Donaldson PT

## 2020-11-24 ENCOUNTER — HOSPITAL ENCOUNTER (OUTPATIENT)
Dept: PHYSICAL THERAPY | Facility: CLINIC | Age: 75
Setting detail: THERAPIES SERIES
Discharge: HOME | End: 2020-11-24
Payer: MEDICARE

## 2020-11-24 PROCEDURE — 97110 THERAPEUTIC EXERCISES: CPT

## 2020-11-24 PROCEDURE — 97140 MANUAL THERAPY 1/> REGIONS: CPT

## 2020-11-24 NOTE — FLOWSHEET NOTE
[] [facility] &  Therapy  [address]  [phone]  F: [phone] [x] [address]  [facility]   [address]  P: [phone]  F: [phone] [] [address] &  Therapy  [address] [facility]  P: [phone]  F: [phone] [] [address]  [facility]   [address]   [place]: [phone]  F: [phone]      Physical Therapy Daily Treatment Note    Date:  2020  Patient Name:  Steven Chao    :  1945  MRN: [id number]  Physician: Ron Uriarte MD                            Insurance: Medicare, Secondary  BMN  Medical Diagnosis: T72.905 (ICD-10-CM) - S/P reverse total shoulder arthroplasty, right      Rehab Codes: M25.511, M25.611  Onset Date: DOS 20                  Next 's appt: to be scheduled as needed   Visit# / total visits: ; Progress note for Medicare patient due at visit 17   Cancels/No Shows: 0/0    Subjective:    Pain:  [x] Yes  [] No Location: R shoulder Pain Rating: (0-10 scale) 0/10  Pain altered Tx:  [] No  [] Yes  Action:  Comments: Pt has been preparing for thanksgiving and doing a lot of cooking. Has noticed she has trouble putting on her skull cap on with her RUE.      Objective:  Modalities: cold pack applied to R shoulder in semi recumbent position x 10 minutes- NOT TODAY    Precautions: R reverse total shoulder, DOS 20  Exercises: bolded completed 20   Exercise Reps/ Time Weight/ Level Comments   UBE 2/2  L1 added 11/10   Pulleys 2/2 NOT TODAY Flex, abd   Wand exercises 15x5\"ea 3# Flex, horizontal abduction, abd, SA punch, chest press         Prone       Shoulder ext 15 2#    Rows 10x 3# Wt progressed    Mid trap row (90 flex) 15x 1# Wt added    T 15x3\" 1#          Side lying       abduction 15x 2#    Hor abduction 15x A Upper trap stretch 3x20\"  HEP     Levator stretch 3x20\"  HEP     STM and TPR over R UT, levator. 2 cups applied to R UT 8 min   Palpable trigger points noted in R UT/levator, some release noted after manual               R Shld AROM 10x ea   Flexion, abduction slow and controlled through full ROM    shld abduction 12x 1# Progressed reps 11/17   shld flexion 12x 1#  Progressed reps 11/17   Wall push up 15x peach          Horizontal abd on wall 15x ea peach Progressed reps 10/27   Ball circles-shld stability 15x ea yellow t-ball Progressed 11/17         T-band      extension 15x blue    row 15x blue    high row 15x blue    scap depression 15x purple Added 11/17   SCAR MOBS Not today    Over surgical scar: lateral mob and pill rolling    Other:     Specific Instructions for next treatment:  goal assessment         Treatment Charges: Mins Units   []  Modalities: ice pack     [x]  Ther Exercise 47 3   [x]  Manual Therapy 8 1   []  Ther Activities     []  Aquatics     []  Vasocompression     []  Other     Total Treatment time 55 4   Estimated cost of care to date 11/24/20: $2,116.58  kx modifier needed next session     Assessment: [x] Progressing toward goals. Slow progression through exercises this date due to discomfort noted anteriorly on rib cage with prone exercises; therapist offered to hold prone exercises however pt wishes to [facility] through it\". After completing some strengthening and mobility exercises, patient wishes to have manual work completed due to Right upper trap tightness. Pt notes good relief of tightness following manual.     [] No change. [] Other:  [x] Patient would continue to benefit from skilled physical therapy services in order to: restore ROM, mobilize scar and tight musculature, and improve R shoulder strength for improved functional mobility and optimal quality of life.     STG: (to be met in 6 treatments)  1. ? Pain: pt will report <3/10 right shoulder pain on average during exercises for improved tolerance to restoration of motion- MET, 0/10 average    2. ? ROM: pt to display the following AROM against gravity for improved ability to complete overhead tasks:  1. Flexion to 140°- Ongoing, 138°    2. Abduction to 140°-Ongoing, 134 °   3. ? Function:   1. Pt to report 9 point improvement on UEFS to indicate improved functional mobility-ONGOING  2. Pt to report ability to brush/style hair with R hand without increased pain or significant pain for improved ability to complete ADLs- NOT MET  4. Patient to be independent with home exercise program as demonstrated by performance with correct form without cues. -MET  5. Demonstrate Knowledge of fall prevention-MET  LTG: (to be met in 12 treatments)  1. Pt will report 1/10 or lower R shoulder pain levels with activity for improved QOL  2. Pt to demo functional and pain free right shoulder ROM for improved ability to complete ADL's   3. Pt will demo grossly 4+/5 R shoulder abduction, flexion,and IR strength for improved stability of joint and ability to carry out lifting, pushing, pulling, etc.  4. Pt will demo grossly 4/5 R scapular stabilizer strength for improved scapulothoracic rhythm during overhead movements  5. Pt will demo ability to lift 2# weight overhead onto a high cabinet without increased pain or significant difficulty for improved ease of putting groceries away    Pt. Education:  [] Yes  [] No  [x] Reviewed Prior HEP/Ed  Method of Education: [] Verbal  [] Demo  [] Written  Comprehension of Education:  [] Verbalizes understanding. [] Demonstrates understanding. [] Needs review. [x] Demonstrates/verbalizes HEP/Ed previously given. Plan: [x] Continue current frequency toward long and short term goals.     [x] Specific Instructions for subsequent treatments: goal assessment       Time In: 6:00 pm           Time Out: 7:05 pm    Electronically signed by:  Reyna Hilliard PT

## 2020-12-01 ENCOUNTER — HOSPITAL ENCOUNTER (OUTPATIENT)
Dept: PHYSICAL THERAPY | Facility: CLINIC | Age: 75
Setting detail: THERAPIES SERIES
Discharge: HOME | End: 2020-12-01
Payer: MEDICARE

## 2020-12-01 PROCEDURE — 97110 THERAPEUTIC EXERCISES: CPT

## 2020-12-01 PROCEDURE — 97140 MANUAL THERAPY 1/> REGIONS: CPT

## 2020-12-01 NOTE — FLOWSHEET NOTE
[] [facility]) [facility] &  Therapy  [address]  [phone]  F: [phone] [x] [address]  [facility]   [address]  P: [phone]  F: [phone] [] [address]  Therapy  [address] [place] [address]  P: [phone]  F: [phone] [] [address]  [facility]   [address]   [place]: [phone]  F: [phone]      Physical Therapy Daily Treatment Note    Date:  2020  Patient Name:  Moris Garcia    :  1945  MRN: [id number]  Physician: Costa Parry MD                            Insurance: Medicare, Secondary  BMN  Medical Diagnosis: I77.445 (ICD-10-CM) - S/P reverse total shoulder arthroplasty, right      Rehab Codes: M25.511, M25.611  Onset Date: DOS 20                  Next 's appt: to be scheduled as needed   Visit# / total visits: ; Progress note for Medicare patient due at visit 17   Cancels/No Shows: 0/0    Subjective:    Pain:  [x] Yes  [] No Location: R shoulder Pain Rating: (0-10 scale) 0/10  Pain altered Tx:  [] No  [] Yes  Action:  Comments: Pt reports some recent right subscapularis pain after increased baking/sewing over the holiday weekend. Pt reports improved R shoulder endurance during ADL's and denies recent R shoulder pain.      Objective:  Modalities: cold pack applied to R shoulder in semi recumbent position x 10 minutes- NOT TODAY    Precautions: R reverse total shoulder, DOS 20  Exercises: bolded completed 20   Exercise Reps/ Time Weight/ Level Comments   UBE 2/2  L1 added 11/10   Pulleys 2/2 NOT TODAY Flex, abd   Wand exercises 15x5\"ea 3# Flex, horizontal abduction, abd, SA punch, chest press         Prone       Shoulder ext 15 2#    Rows 10x 3# Wt progressed    Mid trap row (90 flex) 15x 1# Wt added    T 15x3\" 1# therapy. [] No change. [] Other:  [x] Patient would continue to benefit from skilled physical therapy services in order to: restore ROM, mobilize scar and tight musculature, and improve R shoulder strength for improved functional mobility and optimal quality of life. STG: (to be met in 6 treatments)  1. ? Pain: pt will report <3/10 right shoulder pain on average during exercises for improved tolerance to restoration of motion- MET, 0/10 average    2. ? ROM: pt to display the following AROM against gravity for improved ability to complete overhead tasks:  1. Flexion to 140°- MET    2. Abduction to 140°-Ongoing  3. ? Function:   1. Pt to report 9 point improvement on UEFS to indicate improved functional mobility-MET  2. Pt to report ability to brush/style hair with R hand without increased pain or significant pain for improved ability to complete ADLs-Ongoing  4. Patient to be independent with home exercise program as demonstrated by performance with correct form without cues. -MET  5. Demonstrate Knowledge of fall prevention-MET  LTG: (to be met in 12 treatments)  1. Pt will report 1/10 or lower R shoulder pain levels with activity for improved QOL-MET  2. Pt to demo functional and pain free right shoulder ROM for improved ability to complete ADL's- MET, pain free and WFL   3. Pt will demo grossly 4+/5 R shoulder abduction, flexion,and IR strength for improved stability of joint and ability to carry out lifting, pushing, pulling, etc.-MET  4. Pt will demo grossly 4/5 R scapular stabilizer strength for improved scapulothoracic rhythm during overhead movements- Ongoing  5. Pt will demo ability to lift 2# weight overhead onto a high cabinet without increased pain or significant difficulty for improved ease of putting groceries away-MET    Pt. Education:  [x] Yes  [] No  [x] Reviewed Prior HEP/Ed  Method of Education: [] Verbal  [] Demo  [] Written  Comprehension of Education:  [] Verbalizes understanding.   [] Demonstrates understanding. [] Needs review. [x] Demonstrates/verbalizes HEP/Ed previously given. Plan: [] Continue current frequency toward long and short term goals.     [x] Discharge      Time In: 6:05 pm           Time Out: 7:09 pm    Electronically signed by:  Alena Soulier, PT

## 2020-12-02 NOTE — DISCHARGE SUMMARY
[] [facility]) - [facility] &  Therapy  [address]  [phone]  F: [phone] [x] [address]  [address]   [address]  P: [phone]  F: [phone] [] [address] &  Therapy  [address]  P: [phone]  F: [phone] [] [address]  P: [phone]  F: [phone] [] [address]  [facility]   [address]   [place]: [phone]  F: [phone]      Physical Therapy Discharge Note    Date: 2020      Patient: Cherise Bell  : 1945  MRN: [id number]    Sue Munoz MD                            Insurance: Medicare, Secondary  BMN  Medical Diagnosis: Z96.611 (ICD-10-CM) - S/P reverse total shoulder arthroplasty, right      Rehab Codes: M25.511, M25.611  Onset Date: DOS 20                  Next 's appt: to be scheduled as needed   Visit# / total visits: ; Progress note for Medicare patient due at visit 17          Cancels/No Shows: 0/0  Date of initial visit: 20                Date of final visit: 20    Subjective:    Pain:  [x]? Yes  []? No   Location: R shoulder   Pain Rating: (0-10 scale) 0/10  Pain altered Tx:  []? No  []? Yes  Action:  Comments: Pt reports some recent right subscapularis pain after increased baking/sewing over the holiday weekend; reports this pain has since resolved. Pt reports improved R shoulder endurance during ADL's and denies recent R shoulder pain. Objective:  20 STRENGTH       Right   Sit Shld Flex   4+   Sit Shld Abd   4+   Sit Shld IR   4+   Shoulder Flex       Ext       ABD       ER @ 45        rhomboids   4-   Mid Trap   4   Lower Trap   4-   Elbow Flex.   4   Elbow Ext.   4-      UEFS: 68/80, 15% impairment     Assessment:  [x]? Progressing toward goals. Pt reports significant improvement in ability to utilize RUE during sewing and baking activities due to improved ROM and no presence of pain. Pt demonstrates grossly 4+/5 R shoulder strength which has improved ability to stir and lift items with RUE for longer periods of time. At this time, patient has met most goals and denies recent pain. Pt reports she is able to complete all ADL's without significant issue and is confident with HEP. Pt will now be discharged from physical therapy. []? No change. []? Other:  [x]? Patient would continue to benefit from skilled physical therapy services in order to: restore ROM, mobilize scar and tight musculature, and improve R shoulder strength for improved functional mobility and optimal quality of life.     STG: (to be met in 6 treatments)  1. ? Pain: pt will report <3/10 right shoulder pain on average during exercises for improved tolerance to restoration of motion- MET, 0/10 average    2. ? ROM: pt to display the following AROM against gravity for improved ability to complete overhead tasks:  1. Flexion to 140°- MET    2. Abduction to 140°-Ongoing  3. ? Function:   1. Pt to report 9 point improvement on UEFS to indicate improved functional mobility-MET, 15 pt improvement  2. Pt to report ability to brush/style hair with R hand without increased pain or significant pain for improved ability to complete ADLs-Ongoing  4. Patient to be independent with home exercise program as demonstrated by performance with correct form without cues. -MET  5. Demonstrate Knowledge of fall prevention-MET  LTG: (to be met in 12 treatments)  1. Pt will report 1/10 or lower R shoulder pain levels with activity for improved QOL-MET  2.  Pt to demo functional and pain free right shoulder ROM for improved ability to complete ADL's- MET, pain free and WFL   3. Pt will demo grossly 4+/5 R shoulder abduction,

## 2021-06-29 ENCOUNTER — HOSPITAL ENCOUNTER (OUTPATIENT)
Age: 76
Discharge: HOME | End: 2021-06-29
Payer: MEDICARE

## 2021-06-29 LAB
ABSOLUTE EOS #: 0.23 K/UL (ref 0–0.44)
ABSOLUTE IMMATURE GRANULOCYTE: <0.03 K/UL (ref 0–0.3)
ABSOLUTE LYMPH #: 2.45 K/UL (ref 1.1–3.7)
ABSOLUTE MONO #: 0.81 K/UL (ref 0.1–1.2)
ALBUMIN SERPL-MCNC: 4.1 G/DL (ref 3.5–5.2)
ALBUMIN/GLOBULIN RATIO: 1.2 (ref 1–2.5)
ALP BLD-CCNC: 96 U/L (ref 35–104)
ALT SERPL-CCNC: 12 U/L (ref 5–33)
ANION GAP SERPL CALCULATED.3IONS-SCNC: 15 MMOL/L (ref 9–17)
AST SERPL-CCNC: 20 U/L
BASOPHILS # BLD: 1 % (ref 0–2)
BASOPHILS ABSOLUTE: 0.07 K/UL (ref 0–0.2)
BILIRUB SERPL-MCNC: 0.26 MG/DL (ref 0.3–1.2)
BILIRUBIN DIRECT: <0.08 MG/DL
BILIRUBIN, INDIRECT: ABNORMAL MG/DL (ref 0–1)
BUN BLDV-MCNC: 14 MG/DL (ref 8–23)
BUN/CREAT BLD: ABNORMAL (ref 9–20)
CALCIUM SERPL-MCNC: 10 MG/DL (ref 8.6–10.4)
CHLORIDE BLD-SCNC: 102 MMOL/L (ref 98–107)
CHOLESTEROL/HDL RATIO: 3.5
CHOLESTEROL: 183 MG/DL
CO2: 20 MMOL/L (ref 20–31)
CREAT SERPL-MCNC: 0.93 MG/DL (ref 0.5–0.9)
DIFFERENTIAL TYPE: ABNORMAL
EOSINOPHILS RELATIVE PERCENT: 3 % (ref 1–4)
ESTIMATED AVERAGE GLUCOSE: 114 MG/DL
GFR AFRICAN AMERICAN: >60 ML/MIN
GFR NON-AFRICAN AMERICAN: 59 ML/MIN
GFR SERPL CREATININE-BSD FRML MDRD: ABNORMAL ML/MIN/{1.73_M2}
GFR SERPL CREATININE-BSD FRML MDRD: ABNORMAL ML/MIN/{1.73_M2}
GLOBULIN: ABNORMAL G/DL (ref 1.5–3.8)
GLUCOSE BLD-MCNC: 105 MG/DL (ref 70–99)
HBA1C MFR BLD: 5.6 % (ref 4–6)
HCT VFR BLD CALC: 37.1 % (ref 36.3–47.1)
HDLC SERPL-MCNC: 52 MG/DL
HEMOGLOBIN: 11.8 G/DL (ref 11.9–15.1)
IMMATURE GRANULOCYTES: 0 %
LDL CHOLESTEROL: 104 MG/DL (ref 0–130)
LYMPHOCYTES # BLD: 29 % (ref 24–43)
MCH RBC QN AUTO: 30.3 PG (ref 25.2–33.5)
MCHC RBC AUTO-ENTMCNC: 31.8 G/DL (ref 28.4–34.8)
MCV RBC AUTO: 95.1 FL (ref 82.6–102.9)
MONOCYTES # BLD: 10 % (ref 3–12)
NRBC AUTOMATED: 0 PER 100 WBC
PDW BLD-RTO: 15.8 % (ref 11.8–14.4)
PLATELET # BLD: 322 K/UL (ref 138–453)
PLATELET ESTIMATE: ABNORMAL
PMV BLD AUTO: 10.5 FL (ref 8.1–13.5)
POTASSIUM SERPL-SCNC: 4.8 MMOL/L (ref 3.7–5.3)
RBC # BLD: 3.9 M/UL (ref 3.95–5.11)
RBC # BLD: ABNORMAL 10*6/UL
SEG NEUTROPHILS: 57 % (ref 36–65)
SEGMENTED NEUTROPHILS ABSOLUTE COUNT: 4.82 K/UL (ref 1.5–8.1)
SODIUM BLD-SCNC: 137 MMOL/L (ref 135–144)
TOTAL PROTEIN: 7.6 G/DL (ref 6.4–8.3)
TRIGL SERPL-MCNC: 137 MG/DL
VLDLC SERPL CALC-MCNC: NORMAL MG/DL (ref 1–30)
WBC # BLD: 8.4 K/UL (ref 3.5–11.3)
WBC # BLD: ABNORMAL 10*3/UL

## 2021-06-29 PROCEDURE — 85025 COMPLETE CBC W/AUTO DIFF WBC: CPT

## 2021-06-29 PROCEDURE — 83036 HEMOGLOBIN GLYCOSYLATED A1C: CPT

## 2021-06-29 PROCEDURE — 80061 LIPID PANEL: CPT

## 2021-06-29 PROCEDURE — 80048 BASIC METABOLIC PNL TOTAL CA: CPT

## 2021-06-29 PROCEDURE — 36415 COLL VENOUS BLD VENIPUNCTURE: CPT

## 2021-06-29 PROCEDURE — 80076 HEPATIC FUNCTION PANEL: CPT

## 2021-07-07 DIAGNOSIS — Z96.611 S/P REVERSE TOTAL SHOULDER ARTHROPLASTY, RIGHT: ICD-10-CM

## 2021-07-07 RX ORDER — AMOXICILLIN 500 MG/1
CAPSULE ORAL
Qty: 4 CAPSULE | Refills: 0 | Status: SHIPPED | OUTPATIENT
Start: 2021-07-07

## 2021-07-07 NOTE — TELEPHONE ENCOUNTER
Pt called requesting antibiotic for upcoming dental visit on 7/22/21    Last request for same thing documented on 10/28/2020     It is pended below

## 2021-07-14 ENCOUNTER — HOSPITAL ENCOUNTER (OUTPATIENT)
Dept: PHYSICAL THERAPY | Facility: CLINIC | Age: 76
Setting detail: THERAPIES SERIES
Discharge: HOME | End: 2021-07-14
Payer: MEDICARE

## 2021-07-14 PROCEDURE — 97161 PT EVAL LOW COMPLEX 20 MIN: CPT

## 2021-07-14 PROCEDURE — 97110 THERAPEUTIC EXERCISES: CPT

## 2021-07-14 NOTE — CONSULTS
[x] [address] [facility] [address]   [address]  P: [phone]  F: [phone] [] [address] [facility]  [address] [facility]   [address]  P: [phone]  F: [phone]       Physical Therapy Spine Evaluation    Date:  2021  Patient: Pierre Meeks  : 1945  MRN: [id number]  Physician: Dr. Justino Mack MD  Insurance: Medicare primary, [address] [place] [address] secondary  Medical Diagnosis: M54.5 - Chronic low back pain  Rehab Codes: M54.5, M62.81, R26.2  Onset date: 2021 referral  Next 's appt.:      Subjective:   CC: L sided low back pain  HPI: Pt reports history of left sided low back pain secondary to scoliosis, she reports that it has been increasing over the past few years and caused her increased difficulty and pain with ADLs. Pt reports increased difficulty with activities that require bending such as changing her laundry and unloading the . She reports that she is unable to stand for greater than 10-15 minutes before she has increased pain and has to go sit down. Additionally, she reports having to rely on upper extremity assist with transfers. Pt reports that low back pain is left sided. Pt denies radicular pain down LEs, pt denies LE weakness or numbness and tingling. Pt reports that she broke her L ankle approximately 8 years ago that required surgical fixation and then a resultant surgery to remove the hardware. She reports increased L pronation following surgery and then an increase in L foot drop of unknown cause approximately 4 years ago. Pt reports that she has tried a custom orthotic and bracing but reports that they were both too big for her shoe. She reports that she is often able to walk without much catching of her L foot. PMHx: [x]? ? HTN       [x]? ? Cancer-basal cell       [x]? ? Arthritis                     [x]? ? Other: currently smoker, Reverse total shoulder on Grocery shop/meal prep [x] Independent  [] Assist [x] Independent  [] Assist Pt modifies lifting of heavier objects     Gait Prior level of function Current level of function    [x] Independent  [] Assist [x] Independent  [] Assist   Device: [x] Independent [x] Independent    [] Straight Cane [] Quad cane [] Straight Cane [] Quad cane    [] Standard walker [] Rolling walker   [] 4 wheeled walker [] Standard walker [] Rolling walker   [] 4 wheeled walker    [] Wheelchair [] Wheelchair   Pt reports that she has a cane that she keeps in her car in case she is walking on uneven terrain    Pain present? Yes   Location L sided low back   Pain Rating currently 1-2/10   Pain altered treatment N/A   Pain at worse 6-7/10   Pain at best 0/10   Description of pain Sharp, aching   Altered Sensation N/A   What makes it worse Bending such as unloading the , standing 10-15 minutes, unloading laundry, bending over while doing quilting   What makes it better Rest, sitting, lying completely flat   Symptom progression Static   Sleep Not disturbed - pt sleeps on R side           Objective:   STRENGTH  ROM    Left Right Cervical    L1-2 Hip Flex 3+ 4 Flexion    Hip Abd 3 3+ Extension    L3-4 Knee Ext 5 5 Rotation L R   L4 Ankle DF 4 5 Sidebend L R   L5 EHL   Retraction    S1 Plant.  Flex   Lumbar    Abdominals unable to keep TrA with slight leg lift unable to keep TrA with slight leg lift Flexion WFL   Erector Spinae   Extension 10% impaired, increased low back pain      Rotation L  R   Knee flex 5 5 Sidebend L WFL R WFL      UE/LE                                                 Flexibility Normal Left tight Right tight   Hip flexor [x] [] []   quad [] [] []   HS [] [] []   piriformis [x] [] []   ITB [] [] []   gastroc [] [x] []   Soleus  [] [] []    [] [] []    [] [] []        TESTS (+/-) LEFT RIGHT Not Tested   SLR [] sit [] supine - - []   Hamstring (SLR) - - []   SKTC - - []   DKTC   []   Slump/Dural - - []   SI JT   [] LISA - - []   Joint Mobility   []   Lory Tests ? Pain ? Pain No Change Not Tested   RFIS [] [] [x] []   BRENTON [] [] [x] []   RFIL [] [] [] []   REIL [] [] [] []   Rep Prot [] [] [] []   Rep Retract [] [] [] []       OBSERVATION No Deficit Deficit Not Tested Comments   Posture    R scapular retracted and R iliac crest superior to L due to scoliosis curvature   Forward Head [] [] []    Rounded Shoulders [] [] []    Kyphosis [] [] []    Lordosis [] [] []    Lateral Shift [] [] []    Scoliosis [] [] []    Iliac Crest [] [x] []    PSIS [] [] []    ASIS [] [] []    Genu Valgus [] [x] []    Genu Varus [] [] []    Genu Recurvatum [] [] []    Pronation [] [x] [] Excessive L ankle pronation and lack of posterior tibialis strength   Supination [] [] []    Leg Length Discrp [] [] [x]    Slumped Sitting [] [x] [] Pt often changes position if not in her chair at home that provides additional lumbar support   Palpation [] [x] [] Tender to palpation of L lower lumbar   Sensation [x] [] []    Edema [x] [] []    Neurological [x] [] []      FUNCTION Normal Difficult Unable   Sitting [x] [] []   Standing [] [x] []   Ambulation [] [x] []   Groom/Dress [x] [] []   Lift/Carry [] [x] []   Stairs [] [x] []   Bending [] [x] []   Squat [] [x] []   Kneel [x] [] []     BALANCE/PROPRIOCEPTION              [x] Not tested   Single leg stance       R                     L                                PAIN   Eyes open                             Sec. Sec                  . []    Eyes closed                          Sec. Sec                  . []      Functional Test: Oswestry Low Back Pain Scale Score: 52% functionally impaired     Comments:    Assessment:  Gladis is a 77 y.o. female presenting with L sided low back pain secondary to scoliosis that she reports has increased over the past few years.  Pt reports that she has increased difficulty with bending tasks related to ADL's such as doing laundry and dishes and she is unable to stand for greater than 10-15 minutes. Pt presents with significant core and hip weakness as well as impaired gait mechanics. During ambulation pt presents with a Trendelenberg gait pattern and L foot drop. Pt denies radicular symptoms. Patient would benefit from skilled physical therapy services in order to: improve hip and core strength in order to improve tolerance to prolonged positioning in standing and improve tolerance to bending activities for ADLs and to decrease low back pain and normalize gait pattern. Problems:    [x] ? Pain: L side low back, 0-7/10  [x] ? ROM: impaired lumbar extension and L DF  [x] ? Strength: core and hip weakness  [x] ? Function: Oswestry = 52% functional impairment  [x] Other: Trendelenberg gait pattern and L foot drop/increased pronation      STG: (to be met in 6 treatments)  1. ? Pain: Pt will report decreased maximal pain levels to <5/10 following standing for 15 minutes or repetitive lifting while doing the laundry. 2. ? ROM: Pt will demonstrate full lumbar AROM without increased pain noted with 5 repetitions in each direction in order to improve tolerance to prolonged positioning and repetitive bending tasks. 3. ? Strength: Pt will increase core strength as evident by ability to maintain transverse abdominus activation with seated march in order to improve control with seated and standing exercises and decrease strain placed on low back. 4. ? Function: Pt will demonstrate improved functional activity tolerance as evident by an improved score on the Oswestry to <40% functional impairment. 5. Patient to be independent with home exercise program as demonstrated by performance with correct form without cues. LTG: (to be met in 12 treatments)  1.  Pt will demonstrate improved tolerance to ambulation reporting ability to ambulate for 15 minutes without increased pain and patient will demonstrate improved gait mechanics as evident by reduction of Trendelenberg pattern. 2. Pt will increase hip strength to 4/5 globally in order to improve standing and bending ability with decreased strain placed on low back. 3. Pt will demonstrate improved functional activity tolerance as evident by an improved score on the Oswestry to <40% functional impairment. Patient goals: \"reduced pain while standing or bending\"    Rehab Potential:  [x] Good  [] Fair  [] Poor   Suggested Professional Referral:  [x] No  [] Yes:  Barriers to Goal Achievement:  [] No  [x] Yes: scoliosis  Domestic Concerns:  [x] No  [] Yes:    Pt. Education:  [x] Plans/Goals, Risks/Benefits discussed  [x] Home exercise program    Method of Education: [x] Verbal  [x] Demo  [x] Written  7/14/2021 HE[facility] charted exercises; [id number]  Comprehension of Education:  [x] Verbalizes understanding. [x] Demonstrates understanding. [x] Needs Review. [] Demonstrates/verbalizes understanding of HEP/Ed previously given. Treatment Plan:  [x] Therapeutic Exercise   55277  [] Iontophoresis: 4 mg/mL Dexamethasone Sodium Phosphate  mAmin  62801   [] Therapeutic Activity  69325 [] Vasopneumatic cold with compression  63379    [] Gait Training   21721 [] Ultrasound   32106   [x] Neuromuscular Re-education  92951 [] Electrical Stimulation Unattended  83791   [x] Manual Therapy  66053 [] Electrical Stimulation Attended  56767   [x] Instruction in HEP  [] Lumbar/Cervical Traction  27509   [] Aquatic Therapy   77719 [x] Cold/hotpack    [] Massage   25576      [] Dry Needling, 1 or 2 muscles  13798   [] Biofeedback, first 15 minutes   81363  [] Biofeedback, additional 15 minutes   73434 [] Dry Needling, 3 or more muscles  15086     []  Medication allergies reviewed for use of    Dexamethasone Sodium Phosphate 4mg/ml     with iontophoresis treatments. Pt is not allergic.     Frequency:  2 x/week for 12 visits        Todays Treatment:  Modalities:   Precautions:  Exercises:  Exercise Reps/

## 2021-07-14 NOTE — FLOWSHEET NOTE
Ernie Fall Risk Assessment    Patient Name:  Glenda Genao  : 1945        Risk Factor Scale  Score   History of Falls [] Yes  [x] No 25  0 0   Secondary Diagnosis [] Yes  [x] No 15  0 0   Ambulatory Aid [] Furniture  [] Crutches/cane/walker  [x] None/bedrest/wheelchair/nurse 30  15  0 0   IV/Heparin Lock [] Yes  [x] No 20  0 0   Gait/Transferring [] Impaired  [x] Weak  [] Normal/bedrest/immobile 20  10  0 10   Mental Status [] Forgets limitations  [x] Oriented to own ability 15  0 0      Total: 10     Based on the Assessment score: check the appropriate box.     [x]  No intervention needed   Low =   Score of 0-24    []  Use standard prevention interventions Moderate =  Score of 24-44   [] Give patient handout and discuss fall prevention strategies   [] Establish goal of education for patient/family RE: fall prevention strategies    []  Use high risk prevention interventions High = Score of 45 and higher   [] Give patient handout and discuss fall prevention strategies   [] Establish goal of education for patient/family Re: fall prevention strategies   [] Discuss lifeline / other resources    Electronically signed by:   Brodie Modi PT  Date: 2021

## 2021-07-21 ENCOUNTER — HOSPITAL ENCOUNTER (OUTPATIENT)
Dept: PHYSICAL THERAPY | Facility: CLINIC | Age: 76
Setting detail: THERAPIES SERIES
Discharge: HOME | End: 2021-07-21
Payer: MEDICARE

## 2021-07-21 PROCEDURE — 97110 THERAPEUTIC EXERCISES: CPT

## 2021-07-21 NOTE — FLOWSHEET NOTE
[] [facility]) [facility]  [facility]  [address]  [phone]  F: [phone] [] [address]  [address]   [address]  P: [phone]  F: [phone] [] [address] &  Therapy  [address]  P: [phone]  F: [phone] [] [address]  P: [phone]  F: [phone] [] [address]  [facility]   [address]   [place]: [phone]  F: [phone]      Physical Therapy Daily Treatment Note    Date:  2021  Patient Name:  Jalen Prabhakar    :  1945  MRN: [id number]  Physician: Dr. Darlene Jalloh MD               Insurance: Medicare primary, [address] [place] [address] secondary  Medical Diagnosis: M54.5 - Chronic low back pain               Rehab Codes: M54.5, M62.81, R26.2  Onset date: 2021 referral                        Next 's appt. :   Visit# / total visits: ;    Progress note for Medicare patient due at visit 10    Cancels/No Shows: 0/0    Subjective:    Pain:  [x] Yes  [] No Location: Low back pain Pain Rating: (0-10 scale) 0/10  Pain altered Tx:  [x] No  [] Yes  Action:  Comments: Pt arrives denying pain at this time.      Objective:  Modalities:   Precautions:  Exercises:  Exercise Reps/ Time Weight/ Level Comments   scifit  10 min            Supine      HS stretch  3x30\" ea     Piriformis stretch 3x30\" ea     LTR 10x5\" ea OP from clinician    Bridges 10x2     [address] 5x10\"     TrA + knee fall out 10x     Transverse Abdominus (TrA) contraction 10x5\"     TrA + hip abd 20x lime    TrA + hip add 20x ball          Sidelying       Hip abd 15x     Clamshells 2x10                         Seated        TrA 10x5\"       scap retraction 10x5\"                   Standing       tband rows with TrA cont 10x3\" Lime    tband ER TrA cont x  Unable to do due to shoulder   tband extension TrA cont 10x3\" Lime    Paloff press 10x ea Lime          Other:     Specific Instructions for next treatment: progress hip and core strength       Treatment Charges: Mins Units   []  Modalities     [x]  Ther Exercise 45 3   []  Manual Therapy     []  Ther Activities     []  Aquatics     []  Vasocompression     []  Other     Total Treatment time 45 3   Estimated Medicare cost as of 7/21/21: $195.84    Assessment: [x] Progressing toward goals. Pt with overall good tolerance to treatment, intermittent notes of \"pulling\" in LB but reports it is tolerable. Pt educated on informing clinician if pulling becomes painful that way exercise can be discontinued. Pt verbalizes understanding. All exercises this date were completed with TrA contraction with fair carry over. Pt has max difficulty completing TrA contraction with out holding her breath. Implemented tactile cueing to ensure completion is correct. Pt is able to complete correctly however cueing to ensure diaphragmatic breathing is present. Encouraged patient to self palpate during exercises to ensure proper execution when out side of the clinic. [] No change. [] Other:  [x] Patient would continue to benefit from skilled physical therapy services in order to: improve hip and core strength in order to improve tolerance to prolonged positioning in standing and improve tolerance to bending activities for ADLs and to decrease low back pain and normalize gait pattern. Problems:    [x]? ? Pain: L side low back, 0-7/10  [x]? ? ROM: impaired lumbar extension and L DF  [x]? ? Strength: core and hip weakness  [x]? ? Function: Oswestry = 52% functional impairment  [x]? Other: Trendelenberg gait pattern and L foot drop/increased pronation       STG: (to be met in 6 treatments)  1. ? Pain: Pt will report decreased maximal pain levels to <5/10 following standing for 15 minutes or repetitive lifting while doing the laundry. 2. ? ROM: Pt will demonstrate full lumbar AROM without increased pain noted with 5 repetitions in each direction in order to improve tolerance to prolonged positioning and repetitive bending tasks. 3. ? Strength: Pt will increase core strength as evident by ability to maintain transverse abdominus activation with seated march in order to improve control with seated and standing exercises and decrease strain placed on low back. 4. ? Function: Pt will demonstrate improved functional activity tolerance as evident by an improved score on the Oswestry to <40% functional impairment. 5. Patient to be independent with home exercise program as demonstrated by performance with correct form without cues. LTG: (to be met in 12 treatments)  1. Pt will demonstrate improved tolerance to ambulation reporting ability to ambulate for 15 minutes without increased pain and patient will demonstrate improved gait mechanics as evident by reduction of Trendelenberg pattern. 2. Pt will increase hip strength to 4/5 globally in order to improve standing and bending ability with decreased strain placed on low back. 3. Pt will demonstrate improved functional activity tolerance as evident by an improved score on the Oswestry to <40% functional impairment.                    Patient goals: \"reduced pain while standing or bending\"    Pt. Education:  [x] Yes  [] No  [x] Reviewed Prior HEP/Ed  Method of Education: [x] Verbal  [] Demo  [] Written  7/14/2021 AT [facility] for charted exercises; [facility]: [id number]  Comprehension of Education:  [x] Verbalizes understanding. [] Demonstrates understanding. [x] Needs review. [] Demonstrates/verbalizes HEP/Ed previously given. Plan: [x] Continue current frequency toward long and short term goals. [x] Specific Instructions for subsequent treatments: Progress as able.        Time In: 300             Time Out: 400 pm    Electronically signed by:  Deedee Blankenship PTA

## 2021-07-26 ENCOUNTER — HOSPITAL ENCOUNTER (OUTPATIENT)
Dept: PHYSICAL THERAPY | Facility: CLINIC | Age: 76
Setting detail: THERAPIES SERIES
Discharge: HOME | End: 2021-07-26
Payer: MEDICARE

## 2021-07-26 PROCEDURE — 97110 THERAPEUTIC EXERCISES: CPT

## 2021-07-26 NOTE — FLOWSHEET NOTE
[] [facility]) - [facility] TWELVESTEP [facility] &  Therapy  [address]  [phone]  F: [phone] [x] [address]  [address]   [address]  P: [phone]  F: [phone] [] [address] &  Therapy  [address]  P: [phone]  F: [phone] [] [address]  P: [phone]  F: [phone] [] [address]  [facility]   [address]   [place]: [phone]  F: [phone]      Physical Therapy Daily Treatment Note    Date:  2021  Patient Name:  Pineda Jett    :  1945  MRN: [id number]  Physician: Dr. Tristen Esqueda MD               Insurance: Medicare primary, [address] [place] [address] secondary  Medical Diagnosis: M54.5 - Chronic low back pain               Rehab Codes: M54.5, M62.81, R26.2  Onset date: 2021 referral                        Next Dr's appt. :   Visit# / total visits: 3/12;    Progress note for Medicare patient due at visit 10    Cancels/No Shows: 0/0    Subjective:    Pain:  [x] Yes  [] No Location: Low back pain Pain Rating: (0-10 scale) 0/10  Pain altered Tx:  [x] No  [] Yes  Action:  Comments: Pt arrives denying pain at this time.      Objective:  Modalities:   Precautions:  Exercises:  Exercise Reps/ Time Weight/ Level Comments   scifit  10 min            Supine      HS stretch  3x30\" ea     Piriformis stretch 3x30\" ea     LTR 10x5\" ea OP from clinician    Bridges 10x2     [address] 5x10\"     TrA + knee fall out 10x     Transverse Abdominus (TrA) contraction 10x5\"     TrA + hip abd 20x lime    TrA + hip add 20x ball          Sidelying       Hip abd 15x     Clamshells 2x10    Added resistance    Reverse clams 2x10    Added            Seated     not today     TrA 10x5\"       scap retraction 10x5\"     Standing       tband rows with TrA cont 10x3\" Lime    tband ER TrA cont x  Unable to do due to shoulder   tband extension TrA cont 10x3\" Lime    Paloff press 10x ea Lime    3 way hip - bilateral  10x ea Lime Added 7/26   Other:     Specific Instructions for next treatment: progress hip and core strength UPDATE HEP        Treatment Charges: Mins Units   []  Modalities     [x]  Ther Exercise 45 3   []  Manual Therapy     []  Ther Activities     []  Aquatics     []  Vasocompression     []  Other     Total Treatment time 45 3   Estimated Medicare cost as of 7/26/21: $270.73    Assessment: [x] Progressing toward goals. Pt with good tolerance to session this date with out incident of increased pain reports. Pt continues to require frequent cueing to ensure core activation through out exercises. Progressed with added resistance to sidelying clam shells with fatigue present. Added in 3 way hip where noticeable trunk compensation is present. Pt required cueing to maintain upright posture. Pt again tends to hold her breath during exercises verses a true TrA contraction there for frequent cueing was implemented. [] No change. [] Other:  [x] Patient would continue to benefit from skilled physical therapy services in order to: improve hip and core strength in order to improve tolerance to prolonged positioning in standing and improve tolerance to bending activities for ADLs and to decrease low back pain and normalize gait pattern. Problems:    [x]? ? Pain: L side low back, 0-7/10  [x]? ? ROM: impaired lumbar extension and L DF  [x]? ? Strength: core and hip weakness  [x]? ? Function: Oswestry = 52% functional impairment  [x]? Other: Trendelenberg gait pattern and L foot drop/increased pronation       STG: (to be met in 6 treatments)  1. ? Pain: Pt will report decreased maximal pain levels to <5/10 following standing for 15 minutes or repetitive lifting while doing the laundry.    2. ? ROM: Pt will demonstrate full lumbar AROM without increased pain noted with 5 repetitions in each direction in order to improve tolerance to prolonged positioning and repetitive bending tasks. 3. ? Strength: Pt will increase core strength as evident by ability to maintain transverse abdominus activation with seated march in order to improve control with seated and standing exercises and decrease strain placed on low back. 4. ? Function: Pt will demonstrate improved functional activity tolerance as evident by an improved score on the Oswestry to <40% functional impairment. 5. Patient to be independent with home exercise program as demonstrated by performance with correct form without cues. LTG: (to be met in 12 treatments)  1. Pt will demonstrate improved tolerance to ambulation reporting ability to ambulate for 15 minutes without increased pain and patient will demonstrate improved gait mechanics as evident by reduction of Trendelenberg pattern. 2. Pt will increase hip strength to 4/5 globally in order to improve standing and bending ability with decreased strain placed on low back. 3. Pt will demonstrate improved functional activity tolerance as evident by an improved score on the Oswestry to <40% functional impairment.                    Patient goals: \"reduced pain while standing or bending\"    Pt. Education:  [x] Yes  [] No  [x] Reviewed Prior HEP/Ed  Method of Education: [x] Verbal  [] Demo  [] Written  7/14/2021 AT [facility]ed [facility]; [facility] [id number]  Comprehension of Education:  [x] Verbalizes understanding. [] Demonstrates understanding. [x] Needs review. [] Demonstrates/verbalizes HEP/Ed previously given. Plan: [x] Continue current frequency toward long and short term goals. [x] Specific Instructions for subsequent treatments: Progress as able.        Time In: 400             Time Out: 500 pm    Electronically signed by:  Alfredito Thibodeaux PTA

## 2021-07-29 ENCOUNTER — HOSPITAL ENCOUNTER (OUTPATIENT)
Dept: PHYSICAL THERAPY | Facility: CLINIC | Age: 76
Setting detail: THERAPIES SERIES
Discharge: HOME | End: 2021-07-29
Payer: MEDICARE

## 2021-07-29 PROCEDURE — 97110 THERAPEUTIC EXERCISES: CPT

## 2021-07-29 NOTE — FLOWSHEET NOTE
[] [facility]) [facility] TWELVESTEP [facility] &  Therapy  [address]  [phone]  F: [phone] [x] [address]  [address]   [address]  P: [phone]  F: [phone] [] [address] &  Therapy  [address]  P: [phone]  F: [phone] [] [address]  P: [phone]  F: [phone] [] [address]  [facility]   [address]   [place]: [phone]  F: [phone]      Physical Therapy Daily Treatment Note    Date:  2021  Patient Name:  Be Pa    :  1945  MRN: [id number]  Physician: Dr. Elroy Ponce MD               Insurance: Medicare primary, [address] [place] [address] secondary  Medical Diagnosis: M54.5 - Chronic low back pain               Rehab Codes: M54.5, M62.81, R26.2  Onset date: 2021 referral                        Next 's appt. :   Visit# / total visits: ;    Progress note for Medicare patient due at visit 10    Cancels/No Shows: 0/0    Subjective:    Pain:  [x] Yes  [] No Location: Low back pain Pain Rating: (0-10 scale) 0/10  Pain altered Tx:  [x] No  [] Yes  Action:  Comments: Pt continues to deny pain symptoms, however states her back feels \"tired\".       Objective:  Modalities:   Precautions:  Exercises:  Exercise Reps/ Time Weight/ Level Comments   scifit  5 min  Lv 2.5  Inc resistance this date          Supine      HS stretch  3x30\" ea     Piriformis stretch 3x30\" ea     LTR 10x5\" ea OP from clinician    Bridges 10x2     [address] 5x10\"     TrA + knee fall out 10x     Transverse Abdominus (TrA) contraction 10x5\"     TrA + hip abd 20x Blue  Inc resistance    TrA + hip add 20x ball          Sidelying       Hip abd 15x     Clamshells 2x10    Added resistance    Reverse clams 2x10    Added          Seated     not today  7/26   TrA 10x5\"       scap retraction 10x5\"                   Standing       tband rows with TrA cont 20x\" Blue    tband ER TrA cont x  Unable to do due to shoulder   tband extension TrA cont 10x3\" Blue     Paloff press 10x ea Blue     3 way hip - bilateral  10x ea Lime Added 7/26   Other:     Specific Instructions for next treatment: progress hip and core strength.        Treatment Charges: Mins Units   []  Modalities     [x]  Ther Exercise 50 3   []  Manual Therapy     []  Ther Activities     []  Aquatics     []  Vasocompression     []  Other     Total Treatment time 50 3   Estimated Medicare cost as of 7/26/21: $345.26    Assessment: [x] Progressing toward goals. Able to increase resistance for Nustep warm up with no c/o pain. Pt able to complete charted exercises with good tolerance. Pt required cueing throughout treatment for core activation and to perform exercises with correct technique with good carry over. Able to increase resistance for theraband exercises to imporve strength with mild soreness in arms, however no pain in LB. Pt continues to require cueing for upright posture during 3 way hip to prevent trunk compensation. Pt received updated HEP. [] No change. [] Other:  [x] Patient would continue to benefit from skilled physical therapy services in order to: improve hip and core strength in order to improve tolerance to prolonged positioning in standing and improve tolerance to bending activities for ADLs and to decrease low back pain and normalize gait pattern. Problems:    [x]? ? Pain: L side low back, 0-7/10  [x]? ? ROM: impaired lumbar extension and L DF  [x]? ? Strength: core and hip weakness  [x]? ? Function: Oswestry = 52% functional impairment  [x]?  Other: Trendelenberg gait pattern and L foot drop/increased pronation       STG: (to be met in 6 treatments)  1. ? Pain: Pt will report decreased maximal pain levels to <5/10 following standing for 15 minutes or repetitive lifting while doing the laundry. 2. ? ROM: Pt will demonstrate full lumbar AROM without increased pain noted with 5 repetitions in each direction in order to improve tolerance to prolonged positioning and repetitive bending tasks. 3. ? Strength: Pt will increase core strength as evident by ability to maintain transverse abdominus activation with seated march in order to improve control with seated and standing exercises and decrease strain placed on low back. 4. ? Function: Pt will demonstrate improved functional activity tolerance as evident by an improved score on the Oswestry to <40% functional impairment. 5. Patient to be independent with home exercise program as demonstrated by performance with correct form without cues. LTG: (to be met in 12 treatments)  1. Pt will demonstrate improved tolerance to ambulation reporting ability to ambulate for 15 minutes without increased pain and patient will demonstrate improved gait mechanics as evident by reduction of Trendelenberg pattern. 2. Pt will increase hip strength to 4/5 globally in order to improve standing and bending ability with decreased strain placed on low back. 3. Pt will demonstrate improved functional activity tolerance as evident by an improved score on the Oswestry to <40% functional impairment.                    Patient goals: \"reduced pain while standing or bending\"    Pt. Education:  [x] Yes  [] No  [x] Reviewed Prior HEP/Ed  Method of Education: [x] Verbal  [] Demo  [] Written  7/14/2021 AT [facility] for charted exercises; [employer]: [id number]    Access Code: [email or web address]  URL: [email or web address]  Date: 07/29/2021  Prepared by: Lana [place]    Exercises  Supine Bridge - 1 x daily - 7 x weekly - 3 sets - 10 reps  Clamshell - 1 x daily - 7 x weekly - 3 sets - 10 reps  Supine Lower Trunk Rotation - 1 x daily - 7 x weekly - 3 sets - 10 reps  Supine Piriformis Stretch with Foot on Ground - 1 x daily - 7 x weekly - 3 sets - 10 reps  Hooklying Clamshell with Resistance - 1 x daily - 7 x weekly - 3 sets - 10 reps  Supine Single Knee to Chest Stretch - 1 x daily - 7 x weekly - 3 sets - 10 reps  Sidelying Hip Abduction - 1 x daily - 7 x weekly - 3 sets - 10 reps  Scapular Retraction with Resistance - 1 x daily - 7 x weekly - 3 sets - 10 reps  Single Arm Shoulder Extension with Anchored Resistance - 1 x daily - 7 x weekly - 3 sets - 10 reps  Standing Hip Extension with Anchored Resistance - 1 x daily - 7 x weekly - 3 sets - 10 reps  Standing Hip Abduction with Anchored Resistance - 1 x daily - 7 x weekly - 3 sets - 10 reps  Standing Hip Flexion with Anchored Resistance and Chair Support - 1 x daily - 7 x weekly - 3 sets - 10 reps      Comprehension of Education:  [x] Verbalizes understanding. [] Demonstrates understanding. [x] Needs review. [] Demonstrates/verbalizes HEP/Ed previously given. Plan: [x] Continue current frequency toward long and short term goals. [x] Specific Instructions for subsequent treatments: Progress as able.        Time In: 2:58pm            Time Out: 3:53 pm    Electronically signed by:  Eugenia Bonner PTA

## 2021-08-02 ENCOUNTER — HOSPITAL ENCOUNTER (OUTPATIENT)
Dept: PHYSICAL THERAPY | Facility: CLINIC | Age: 76
Setting detail: THERAPIES SERIES
Discharge: HOME | End: 2021-08-02
Payer: MEDICARE

## 2021-08-02 PROCEDURE — 97110 THERAPEUTIC EXERCISES: CPT

## 2021-08-02 NOTE — FLOWSHEET NOTE
[] [address] TWELVE-STEP [facility] &  Therapy  [address]  [phone]  F: [phone] [x] [address]  [employer] [address]   [address]  P: [phone]  F: [phone] [] [address] &  Therapy  [address]  P: [phone]  F: [phone] [] [address]  P: [phone]  F: [phone] [] [address]  [facility]   [address]   Genie Satish: [phone]  F: [phone]      Physical Therapy Daily Treatment Note    Date:  2021  Patient Name:  Ely Juarez    :  1945  MRN: [id number]  Physician: Dr. Hu Peace MD               Insurance: Medicare primary, [address] [place] [address] secondary  Medical Diagnosis: M54.5 - Chronic low back pain               Rehab Codes: M54.5, M62.81, R26.2  Onset date: 2021 referral                        Next 's appt. :   Visit# / total visits: ;    Progress note for Medicare patient due at visit 10    Cancels/No Shows: 0/0    Subjective:    Pain:  [x] Yes  [] No Location: Low back pain Pain Rating: (0-10 scale) 0/10  Pain altered Tx:  [x] No  [] Yes  Action:  Comments: Pt sore earlier today maybe from more bending. Worn out all evening after last session. Sore neck the day after last session.      Objective:  Modalities:   Precautions:  Exercises:  Exercise Reps/ Time Weight/ Level Comments   scifit  8 min  Lv 2.5  Inc resistance this date          Supine      HS stretch  3x30\" ea     Piriformis stretch 3x30\" ea     LTR 10x5\" ea OP from clinician    Bridges 10x2     [address] 5x10\"     TrA + knee fall out 10x     Transverse Abdominus (TrA) contraction 10x5\"     TrA + hip abd 20x Blue  Inc resistance    TrA + hip add 20x ball          Sidelying       Hip abd 15x     Clamshells 2x10    Added resistance 7/26   Reverse clams 2x10    Added 7/26           Seated     not today  7/26   TrA 10x5\"       scap retraction 10x5\"                   Standing       tband rows with TrA cont 20x\" Blue    tband ER TrA cont x  Unable to do due to shoulder   tband extension TrA cont 10x3\" Blue     Paloff press 10x ea Blue     3 way hip - bilateral  15x ea Lime Added 7/26   Mini squats 10x  Added 8/2   Other:     Specific Instructions for next treatment: progress hip and core strength.        Treatment Charges: Mins Units   []  Modalities     [x]  Ther Exercise 50 3   []  Manual Therapy     []  Ther Activities     []  Aquatics     []  Vasocompression     []  Other     Total Treatment time 50 3   Estimated Medicare cost as of 8/2/21: $420.15    Assessment: [x] Progressing toward goals. Pt with overall good tolerance to treatment. Continued to require cueing to ensure core activation during all exercises. Added in squats with fair execution. Pt require cueing of all forms to improve however continued to complete with increased anterior tibial translation and decreased hip hinge. Pt fatigues quickly with standing interventions and required cueing to maintain upright posture. [] No change. [] Other:  [x] Patient would continue to benefit from skilled physical therapy services in order to: improve hip and core strength in order to improve tolerance to prolonged positioning in standing and improve tolerance to bending activities for ADLs and to decrease low back pain and normalize gait pattern. Problems:    [x]? ? Pain: L side low back, 0-7/10  [x]? ? ROM: impaired lumbar extension and L DF  [x]? ? Strength: core and hip weakness  [x]? ? Function: Oswestry = 52% functional impairment  [x]?  Other: Trendelenberg gait pattern and L foot drop/increased pronation       STG: (to be met in 6 treatments)  1. ? Pain: Pt will report decreased maximal pain levels to <5/10 following standing for 15 minutes or repetitive lifting while doing the laundry. 2. ? ROM: Pt will demonstrate full lumbar AROM without increased pain noted with 5 repetitions in each direction in order to improve tolerance to prolonged positioning and repetitive bending tasks. 3. ? Strength: Pt will increase core strength as evident by ability to maintain transverse abdominus activation with seated march in order to improve control with seated and standing exercises and decrease strain placed on low back. 4. ? Function: Pt will demonstrate improved functional activity tolerance as evident by an improved score on the Oswestry to <40% functional impairment. 5. Patient to be independent with home exercise program as demonstrated by performance with correct form without cues. LTG: (to be met in 12 treatments)  1. Pt will demonstrate improved tolerance to ambulation reporting ability to ambulate for 15 minutes without increased pain and patient will demonstrate improved gait mechanics as evident by reduction of Trendelenberg pattern. 2. Pt will increase hip strength to 4/5 globally in order to improve standing and bending ability with decreased strain placed on low back. 3. Pt will demonstrate improved functional activity tolerance as evident by an improved score on the Oswestry to <40% functional impairment.                    Patient goals: \"reduced pain while standing or bending\"    Pt. Education:  [x] Yes  [] No  [x] Reviewed Prior HEP/Ed  Method of Education: [x] Verbal  [] Demo  [] Written  7/14/2021 AT [facility] for charted exercises; [facility] [email or web address]    Access Code: [email or web address]  URL: [email or web address]  Date: 07/29/2021  Prepared by: Bev Ashford    Exercises  Supine Bridge - 1 x daily - 7 x weekly - 3 sets - 10 reps  Clamshell - 1 x daily - 7 x weekly - 3 sets - 10 reps  Supine Lower Trunk Rotation - 1 x daily - 7 x weekly - 3 sets - 10 reps  Supine Piriformis Stretch with Foot on Ground - 1 x daily - 7 x weekly - 3 sets - 10 reps  Hooklying Clamshell with Resistance - 1 x daily - 7 x weekly - 3 sets - 10 reps  Supine Single Knee to Chest Stretch - 1 x daily - 7 x weekly - 3 sets - 10 reps  Sidelying Hip Abduction - 1 x daily - 7 x weekly - 3 sets - 10 reps  Scapular Retraction with Resistance - 1 x daily - 7 x weekly - 3 sets - 10 reps  Single Arm Shoulder Extension with Anchored Resistance - 1 x daily - 7 x weekly - 3 sets - 10 reps  Standing Hip Extension with Anchored Resistance - 1 x daily - 7 x weekly - 3 sets - 10 reps  Standing Hip Abduction with Anchored Resistance - 1 x daily - 7 x weekly - 3 sets - 10 reps  Standing Hip Flexion with Anchored Resistance and Chair Support - 1 x daily - 7 x weekly - 3 sets - 10 reps      Comprehension of Education:  [x] Verbalizes understanding. [] Demonstrates understanding. [x] Needs review. [] Demonstrates/verbalizes HEP/Ed previously given. Plan: [x] Continue current frequency toward long and short term goals. [x] Specific Instructions for subsequent treatments: Progress as able.        Time In: 3:00pm            Time Out: 400 pm    Electronically signed by:  Christian Lewis PTA

## 2021-08-04 ENCOUNTER — HOSPITAL ENCOUNTER (OUTPATIENT)
Dept: PHYSICAL THERAPY | Facility: CLINIC | Age: 76
Setting detail: THERAPIES SERIES
Discharge: HOME | End: 2021-08-04
Payer: MEDICARE

## 2021-08-04 PROCEDURE — 97110 THERAPEUTIC EXERCISES: CPT

## 2021-08-04 NOTE — FLOWSHEET NOTE
[] [facility]) - [facility] TWELVESTEP [facility] &  Therapy  [address]  [phone]  F: [phone] [x] [address]  [employer] [address]   [address]  P: [phone]  F: [phone] [] [address] &  Therapy  [address] [facility]  P: [phone]  F: [phone] [] [address]  P: [phone]  F: [phone] [] [address]  [facility]   [address]   Sena Mate: [phone]  F: [phone]      Physical Therapy Daily Treatment Note    Date:  2021  Patient Name:  Ryan Sainz    :  1945  MRN: [id number]  Physician: Dr. Gerson Bar MD               Insurance: Medicare primary, [address] [place] [address] secondary  Medical Diagnosis: M54.5 - Chronic low back pain               Rehab Codes: M54.5, M62.81, R26.2  Onset date: 2021 referral                        Next Dr's appt. :   Visit# / total visits: ;    Progress note for Medicare patient due at visit 10    Cancels/No Shows: 0/0    Subjective:    Pain:  [x] Yes  [] No Location: Low back pain Pain Rating: (0-10 scale) 0/10  Pain altered Tx:  [x] No  [] Yes  Action:  Comments: Pt reports decreased global fatigue following last treatment session. Pt reports that she had increased pain following carrying a 10 pound bucket of cherries in her L UE yesterday.        Objective:  Modalities:   Precautions:  Exercises:  Exercise Reps/ Time Weight/ Level Comments   scifit  8 min  Lv 2.5  Inc resistance this date          Supine      HS stretch  3x30\" ea     Piriformis stretch 3x30\" ea     LTR 10x5\" ea OP from clinician    Bridges 10x2     [facility] 5x10\"     TrA + knee fall out 10x     Transverse Abdominus (TrA) contraction 10x5\"     TrA + hip abd 20x Blue  Inc resistance    TrA + hip add 20x ball          Sidelying 3 way hip exercises reporting increased contralateral soreness with abduction exercises. Pt fatigued by end of treatment session but no increases in pain noted. [] No change. [] Other:  [x] Patient would continue to benefit from skilled physical therapy services in order to: improve hip and core strength in order to improve tolerance to prolonged positioning in standing and improve tolerance to bending activities for ADLs and to decrease low back pain and normalize gait pattern. Problems:    [x]? ? Pain: L side low back, 0-7/10  [x]? ? ROM: impaired lumbar extension and L DF  [x]? ? Strength: core and hip weakness  [x]? ? Function: Oswestry = 52% functional impairment  [x]? Other: Trendelenberg gait pattern and L foot drop/increased pronation       STG: (to be met in 6 treatments) - STGs assessed by primary PT 8/4/2021  1. ? Pain: Pt will report decreased maximal pain levels to <5/10 following standing for 15 minutes or repetitive lifting while doing the laundry. - Progress made 8/4/2021, pt reports that pain greater than 5/10 does not occur as frequently  2. ? ROM: Pt will demonstrate full lumbar AROM without increased pain noted with 5 repetitions in each direction in order to improve tolerance to prolonged positioning and repetitive bending tasks. - Progress made 8/4/2021, increased low back discomfort with repetitive extension  3. ? Strength: Pt will increase core strength as evident by ability to maintain transverse abdominus activation with seated march in order to improve control with seated and standing exercises and decrease strain placed on low back. - Partially met 8/4/2021, able to achieve on the R side, unable to maintain on L side  4. ? Function: Pt will demonstrate improved functional activity tolerance as evident by an improved score on the Oswestry to <40% functional impairment. - MET 8/4/2021, currently 30% functional impairment  5.  Patient to be independent with home exercise program as demonstrated by performance with correct form without cues. - Ongoing 8/4/2021, pt requires intermittent cues for proper demonstration of exercises  LTG: (to be met in 12 treatments)  1. Pt will demonstrate improved tolerance to ambulation reporting ability to ambulate for 15 minutes without increased pain and patient will demonstrate improved gait mechanics as evident by reduction of Trendelenberg pattern. 2. Pt will increase hip strength to 4/5 globally in order to improve standing and bending ability with decreased strain placed on low back. 3. Pt will demonstrate improved functional activity tolerance as evident by an improved score on the Oswestry to <30% functional impairment. - MET 8/4/2021, currently 30% functional impairment                    Patient goals: \"reduced pain while standing or bending\"    Pt. Education:  [x] Yes  [] No  [x] Reviewed Prior HEP/Ed  Method of Education: [x] Verbal  [] Demo  [] Written  7/14/2021 AT [facility] for charted exercises; [employer]: [email or web address]    Access Code: [email or web address]  URL: [email or web address]  Date: 07/29/2021  Prepared by: Charly Lockwood    Exercises  Supine Bridge - 1 x daily - 7 x weekly - 3 sets - 10 reps  Clamshell - 1 x daily - 7 x weekly - 3 sets - 10 reps  Supine Lower Trunk Rotation - 1 x daily - 7 x weekly - 3 sets - 10 reps  Supine Piriformis Stretch with Foot on Ground - 1 x daily - 7 x weekly - 3 sets - 10 reps  Hooklying Clamshell with Resistance - 1 x daily - 7 x weekly - 3 sets - 10 reps  Supine Single Knee to Chest Stretch - 1 x daily - 7 x weekly - 3 sets - 10 reps  Sidelying Hip Abduction - 1 x daily - 7 x weekly - 3 sets - 10 reps  Scapular Retraction with Resistance - 1 x daily - 7 x weekly - 3 sets - 10 reps  Single Arm Shoulder Extension with Anchored Resistance - 1 x daily - 7 x weekly - 3 sets - 10 reps  Standing Hip Extension with Anchored Resistance - 1 x daily - 7 x weekly - 3 sets - 10 reps  Standing Hip Abduction with Anchored Resistance - 1 x daily - 7 x weekly - 3 sets - 10 reps  Standing Hip Flexion with Anchored Resistance and Chair Support - 1 x daily - 7 x weekly - 3 sets - 10 reps      Comprehension of Education:  [x] Verbalizes understanding. [] Demonstrates understanding. [x] Needs review. [] Demonstrates/verbalizes HEP/Ed previously given. Plan: [x] Continue current frequency toward long and short term goals. [x] Specific Instructions for subsequent treatments: Progress as able.        Time In: 3:01 pm            Time Out: 4:06 pm    Electronically signed by:  Dawood Falcon PT

## 2021-08-09 DIAGNOSIS — Z96.611 S/P REVERSE TOTAL SHOULDER ARTHROPLASTY, RIGHT: ICD-10-CM

## 2021-08-09 RX ORDER — AMOXICILLIN 500 MG/1
500 CAPSULE ORAL 2 TIMES DAILY
Qty: 4 CAPSULE | Refills: 0 | Status: SHIPPED | OUTPATIENT
Start: 2021-08-09 | End: 2021-08-11

## 2021-08-09 NOTE — PROGRESS NOTES
Pt called requesting antibiotic for upcoming dental visit. Medication pended. .    Right shoulder reverse arthroplasty DOS:1/31/2020

## 2021-08-11 ENCOUNTER — HOSPITAL ENCOUNTER (OUTPATIENT)
Dept: PHYSICAL THERAPY | Facility: CLINIC | Age: 76
Setting detail: THERAPIES SERIES
Discharge: HOME | End: 2021-08-11
Payer: MEDICARE

## 2021-08-11 PROCEDURE — 97110 THERAPEUTIC EXERCISES: CPT

## 2021-08-11 NOTE — FLOWSHEET NOTE
[] [facility]) [facility] &  Therapy  [address]  [phone]  F: [phone] [x] [address]  [facility]   [address]  P: [phone]  F: [phone] [] [address] &  Therapy  [address]  P: [phone]  F: [phone] [] [address]  P: [phone]  F: [phone] [] [address]  [facility]   [address]   [place]: [phone]  F: [phone]      Physical Therapy Daily Treatment Note    Date:  2021  Patient Name:  Allie Arias    :  1945  MRN: [id number]  Physician: Dr. Magi Sloan MD               Insurance: Medicare primary, [address] [place] [address] secondary  Medical Diagnosis: M54.5 - Chronic low back pain               Rehab Codes: M54.5, M62.81, R26.2  Onset date: 2021 referral                        Next Dr's appt. :   Visit# / total visits: ;    Progress note for Medicare patient due at visit 10    Cancels/No Shows: 0/0    Subjective:    Pain:  [x] Yes  [] No Location: Low back pain Pain Rating: (0-10 scale) no numberical/10  Pain altered Tx:  [x] No  [] Yes  Action:  Comments: Pt reports increased \"tiredness\" in her back following dog sitting and not sleeping in her own bed the past few days. Pt notes that her neck is often sore when she leaves PT.       Objective:  Modalities:   Precautions:  Exercises: bold exercises completed 21  Exercise Reps/ Time Weight/ Level Comments   scifit  8 min  Lv 2.5           Supine      HS stretch  3x30\" ea     Piriformis stretch 3x30\" ea     LTR 10x5\" ea OP from clinician    Bridges 10x2     [address] 5x10\"     TrA + knee fall out 10x     Transverse Abdominus (TrA) contraction 10x5\"     TrA + hip abd 20x [facility] resistance    TrA + hip add 20x ball Sidelying       Hip abd 15x     Clamshells 2x10  Lime  Added resistance 7/26   Reverse clams 2x10    Added 7/26           Seated     not today  7/26   TrA 10x5\"       scap retraction 10x5\"                   Standing       tband rows with TrA cont 20x\" Blue    tband ER TrA cont x  Unable to do due to shoulder   tband extension TrA cont 10x3\" Blue     Paloff press 10x ea Blue     3 way hip - bilateral  15x ea Lime Added 7/26   Mini squats 10x  Added 8/2   Other:     Specific Instructions for next treatment: progress hip and core strength.      Completed by primary PT 8/4/2021  STRENGTH   ROM     Left Right Cervical     L1-2 Hip Flex 4 4+ Flexion     Hip Abd 3+ 4 Extension     L3-4 Knee Ext 5 5 Rotation L R   L4 Ankle DF 4 5 Sidebend L R   L5 EHL     Retraction     S1 Plant. Flex     Lumbar     Abdominals Pt able to keep TrA with march in seated unable to keep TrA with march in seated Flexion WFL   Erector Spinae     Extension WFL, increased low back pain         Rotation L  R   Knee flex 5 5 Sidebend L WFL R WFL         UE/LE                                                                                  Treatment Charges: Mins Units   []  Modalities     [x]  Ther Exercise 47 3   []  Manual Therapy     []  Ther Activities     []  Aquatics     []  Vasocompression     []  Other     Total Treatment time 47 3   Estimated Medicare cost as of 8/11/21: $592.77    Assessment: [x] Progressing toward goals. Exercises performed as bolded above with focus on postural awareness and core stabilization throughout treatment session with good carry over. Held resistive exercises that involved upper extremity use at this date in order to assess if they may be causing increased neck pain d/t history of shoulder injury. Pt with no reports of increased pain throughout treatment session. [] No change.      [] Other:  [x] Patient would continue to benefit from skilled physical therapy services in order to: improve hip and core strength in order to improve tolerance to prolonged positioning in standing and improve tolerance to bending activities for ADLs and to decrease low back pain and normalize gait pattern. Problems:    [x]? ? Pain: L side low back, 0-7/10  [x]? ? ROM: impaired lumbar extension and L DF  [x]? ? Strength: core and hip weakness  [x]? ? Function: Oswestry = 52% functional impairment  [x]? Other: Trendelenberg gait pattern and L foot drop/increased pronation       STG: (to be met in 6 treatments) - STGs assessed by primary PT 8/4/2021  1. ? Pain: Pt will report decreased maximal pain levels to <5/10 following standing for 15 minutes or repetitive lifting while doing the laundry. - Progress made 8/4/2021, pt reports that pain greater than 5/10 does not occur as frequently  2. ? ROM: Pt will demonstrate full lumbar AROM without increased pain noted with 5 repetitions in each direction in order to improve tolerance to prolonged positioning and repetitive bending tasks. - Progress made 8/4/2021, increased low back discomfort with repetitive extension  3. ? Strength: Pt will increase core strength as evident by ability to maintain transverse abdominus activation with seated march in order to improve control with seated and standing exercises and decrease strain placed on low back. - Partially met 8/4/2021, able to achieve on the R side, unable to maintain on L side  4. ? Function: Pt will demonstrate improved functional activity tolerance as evident by an improved score on the Oswestry to <40% functional impairment. - MET 8/4/2021, currently 30% functional impairment  5. Patient to be independent with home exercise program as demonstrated by performance with correct form without cues. - Ongoing 8/4/2021, pt requires intermittent cues for proper demonstration of exercises  LTG: (to be met in 12 treatments)  1.  Pt will demonstrate improved tolerance to ambulation reporting ability to ambulate for 15 minutes without increased pain and patient will demonstrate improved gait mechanics as evident by reduction of Trendelenberg pattern. 2. Pt will increase hip strength to 4/5 globally in order to improve standing and bending ability with decreased strain placed on low back. 3. Pt will demonstrate improved functional activity tolerance as evident by an improved score on the Oswestry to <30% functional impairment. - MET 8/4/2021, currently 30% functional impairment                    Patient goals: \"reduced pain while standing or bending\"    Pt. Education:  [x] Yes  [] No  [x] Reviewed Prior HEP/Ed  Method of Education: [x] Verbal  [] Demo  [] Written  7/14/2021 AT [facility] for charted exercises; [employer]: [email or web address]    Access Code: [email or web address]  URL: [email or web address]  Date: 07/29/2021  Prepared by: Doc Huh    Exercises  Supine Bridge - 1 x daily - 7 x weekly - 3 sets - 10 reps  Clamshell - 1 x daily - 7 x weekly - 3 sets - 10 reps  Supine Lower Trunk Rotation - 1 x daily - 7 x weekly - 3 sets - 10 reps  Supine Piriformis Stretch with Foot on Ground - 1 x daily - 7 x weekly - 3 sets - 10 reps  Hooklying Clamshell with Resistance - 1 x daily - 7 x weekly - 3 sets - 10 reps  Supine Single Knee to Chest Stretch - 1 x daily - 7 x weekly - 3 sets - 10 reps  Sidelying Hip Abduction - 1 x daily - 7 x weekly - 3 sets - 10 reps  Scapular Retraction with Resistance - 1 x daily - 7 x weekly - 3 sets - 10 reps  Single Arm Shoulder Extension with Anchored Resistance - 1 x daily - 7 x weekly - 3 sets - 10 reps  Standing Hip Extension with Anchored Resistance - 1 x daily - 7 x weekly - 3 sets - 10 reps  Standing Hip Abduction with Anchored Resistance - 1 x daily - 7 x weekly - 3 sets - 10 reps  Standing Hip Flexion with Anchored Resistance and Chair Support - 1 x daily - 7 x weekly - 3 sets - 10 reps      Comprehension of Education:  [x] Verbalizes understanding. [] Demonstrates understanding. [x] Needs review.   [] Demonstrates/verbalizes HEP/Ed previously given. Plan: [x] Continue current frequency toward long and short term goals. [x] Specific Instructions for subsequent treatments: Progress as able.        Time In: 3:00 pm            Time Out: 3:56 pm    Electronically signed by:  Belkys Moreno PT

## 2021-08-13 ENCOUNTER — HOSPITAL ENCOUNTER (OUTPATIENT)
Dept: PHYSICAL THERAPY | Facility: CLINIC | Age: 76
Setting detail: THERAPIES SERIES
Discharge: HOME | End: 2021-08-13
Payer: MEDICARE

## 2021-08-13 PROCEDURE — 97110 THERAPEUTIC EXERCISES: CPT

## 2021-08-13 NOTE — FLOWSHEET NOTE
[] [facility]) - [facility]  [facility]  [address]  [phone]  F: [phone] [x] [address]  [address] [facility]   [address]  P: [phone]  F: [phone] [] [address] &  Therapy  [address] [facility]  P: [phone]  F: [phone] [] [address]  P: [phone]  F: [phone] [] [address]  [facility]   [address]   [place]: [phone]  F: [phone]      Physical Therapy Daily Treatment Note    Date:  2021  Patient Name:  Anya Beverly    :  1945  MRN: [id number]  Physician: Dr. Dylan Leach MD               Insurance: Medicare primary, [address] [place] [address] secondary  Medical Diagnosis: M54.5 - Chronic low back pain               Rehab Codes: M54.5, M62.81, R26.2  Onset date: 2021 referral                        Next 's appt. :   Visit# / total visits: ;    Progress note for Medicare patient due at visit 10    Cancels/No Shows: 0/0    Subjective:    Pain:  [x] Yes  [] No Location: Low back pain Pain Rating: (0-10 scale) no numberical/10  Pain altered Tx:  [x] No  [] Yes  Action:  Comments: Pt arrived reporting she is a little tired and sore from cleaning out her fridge and doing her laundry because her power was out at her house for 3 days. Pt states her neck and shoulder pain was not as bad after last session.         Objective:  Modalities:   Precautions:  Exercises: bold exercises completed 21  Exercise Reps/ Time Weight/ Level Comments   scifit  8 min  Lv 2.5           Supine      HS stretch  3x30\" ea     Piriformis stretch 3x30\" ea     LTR 10x5\" ea OP from clinician    Bridges 10x2     [address] 5x10\"     TrA + knee fall out 10x     Transverse Abdominus (TrA) contraction 10x5\"     TrA + hip abd 20x [employer] resistance 7/29   TrA + hip add 20x ball          Sidelying       Hip abd 15x     Clamshells 2x10  Blue  Added resistance 7/26, inc resistance 8/13   Reverse clams 2x10    Added 7/26           Seated     not today  7/26   TrA 10x5\"       scap retraction 10x5\"                   Standing       tband rows with TrA cont 20x\" Blue    tband ER TrA cont x  Unable to do due to shoulder   tband extension TrA cont 10x3\" Blue     Paloff press 10x ea Blue     paloff press/ chops without wt  10xea   Cueing for TrA contraction added 8/13   3 way hip - bilateral  15x ea Blue  Added 7/26, inc resistance 8/13   Mini squats 10x2  Added 8/2, inc reps 8/13   Other:     Specific Instructions for next treatment: progress hip and core strength.      Completed by primary PT 8/4/2021  STRENGTH   ROM     Left Right Cervical     L1-2 Hip Flex 4 4+ Flexion     Hip Abd 3+ 4 Extension     L3-4 Knee Ext 5 5 Rotation L R   L4 Ankle DF 4 5 Sidebend L R   L5 EHL     Retraction     S1 Plant. Flex     Lumbar     Abdominals Pt able to keep TrA with march in seated unable to keep TrA with march in seated Flexion WFL   Erector Spinae     Extension WFL, increased low back pain         Rotation L  R   Knee flex 5 5 Sidebend L WFL R WFL         UE/LE                                                                                  Treatment Charges: Mins Units   []  Modalities     [x]  Ther Exercise 44 3   []  Manual Therapy     []  Ther Activities     []  Aquatics     []  Vasocompression     []  Other     Total Treatment time 44 3   Estimated Medicare cost as of 8/13/21: $720.45    Assessment: [x] Progressing toward goals. Pt able to complete all charted exercises with good tolerance. Able to progress 3 way hip, mini squats, and clamshells to improve LE strength with good tolerance. Continued to hold UE strengthening to prevent neck and shoulder pain.  Pt required verbal cueing for proper placement during 3 way hip and mini squats to prevent anterior tibial translation with good carry over. Will continue to progress strengthening as tolerated. [] No change. [] Other:  [x] Patient would continue to benefit from skilled physical therapy services in order to: improve hip and core strength in order to improve tolerance to prolonged positioning in standing and improve tolerance to bending activities for ADLs and to decrease low back pain and normalize gait pattern. Problems:    [x]? ? Pain: L side low back, 0-7/10  [x]? ? ROM: impaired lumbar extension and L DF  [x]? ? Strength: core and hip weakness  [x]? ? Function: Oswestry = 52% functional impairment  [x]? Other: Trendelenberg gait pattern and L foot drop/increased pronation       STG: (to be met in 6 treatments) - STGs assessed by primary PT 8/4/2021  1. ? Pain: Pt will report decreased maximal pain levels to <5/10 following standing for 15 minutes or repetitive lifting while doing the laundry. - Progress made 8/4/2021, pt reports that pain greater than 5/10 does not occur as frequently  2. ? ROM: Pt will demonstrate full lumbar AROM without increased pain noted with 5 repetitions in each direction in order to improve tolerance to prolonged positioning and repetitive bending tasks. - Progress made 8/4/2021, increased low back discomfort with repetitive extension  3. ? Strength: Pt will increase core strength as evident by ability to maintain transverse abdominus activation with seated march in order to improve control with seated and standing exercises and decrease strain placed on low back. - Partially met 8/4/2021, able to achieve on the R side, unable to maintain on L side  4. ? Function: Pt will demonstrate improved functional activity tolerance as evident by an improved score on the Oswestry to <40% functional impairment. - MET 8/4/2021, currently 30% functional impairment  5. Patient to be independent with home exercise program as demonstrated by performance with correct form without cues.  - Ongoing 8/4/2021, pt requires intermittent cues for proper demonstration of exercises  LTG: (to be met in 12 treatments)  1. Pt will demonstrate improved tolerance to ambulation reporting ability to ambulate for 15 minutes without increased pain and patient will demonstrate improved gait mechanics as evident by reduction of Trendelenberg pattern. 2. Pt will increase hip strength to 4/5 globally in order to improve standing and bending ability with decreased strain placed on low back. 3. Pt will demonstrate improved functional activity tolerance as evident by an improved score on the Oswestry to <30% functional impairment. - MET 8/4/2021, currently 30% functional impairment                    Patient goals: \"reduced pain while standing or bending\"    Pt. Education:  [x] Yes  [] No  [x] Reviewed Prior HEP/Ed  Method of Education: [x] Verbal  [] Demo  [] Written  7/14/2021 AT [facility] for charted exercises; [employer]: [email or web address]    Access Code: [email or web address]  URL: [email or web address]  Date: 07/29/2021  Prepared by: Michael Casanova    Exercises  Supine Bridge - 1 x daily - 7 x weekly - 3 sets - 10 reps  Clamshell - 1 x daily - 7 x weekly - 3 sets - 10 reps  Supine Lower Trunk Rotation - 1 x daily - 7 x weekly - 3 sets - 10 reps  Supine Piriformis Stretch with Foot on Ground - 1 x daily - 7 x weekly - 3 sets - 10 reps  Hooklying Clamshell with Resistance - 1 x daily - 7 x weekly - 3 sets - 10 reps  Supine Single Knee to Chest Stretch - 1 x daily - 7 x weekly - 3 sets - 10 reps  Sidelying Hip Abduction - 1 x daily - 7 x weekly - 3 sets - 10 reps  Scapular Retraction with Resistance - 1 x daily - 7 x weekly - 3 sets - 10 reps  Single Arm Shoulder Extension with Anchored Resistance - 1 x daily - 7 x weekly - 3 sets - 10 reps  Standing Hip Extension with Anchored Resistance - 1 x daily - 7 x weekly - 3 sets - 10 reps  Standing Hip Abduction with Anchored Resistance - 1 x daily - 7 x weekly - 3 sets - 10 reps  Standing Hip Flexion with Anchored Resistance and Chair Support - 1 x daily - 7 x weekly - 3 sets - 10 reps      Comprehension of Education:  [x] Verbalizes understanding. [] Demonstrates understanding. [x] Needs review. [] Demonstrates/verbalizes HEP/Ed previously given. Plan: [x] Continue current frequency toward long and short term goals. [x] Specific Instructions for subsequent treatments: Progress as able.        Time In: 3:15 pm            Time Out: 4:07 pm    Electronically signed by:  Emily Yeung PTA

## 2021-08-17 ENCOUNTER — HOSPITAL ENCOUNTER (OUTPATIENT)
Dept: PHYSICAL THERAPY | Facility: CLINIC | Age: 76
Setting detail: THERAPIES SERIES
Discharge: HOME | End: 2021-08-17
Payer: MEDICARE

## 2021-08-17 PROCEDURE — 97110 THERAPEUTIC EXERCISES: CPT

## 2021-08-17 NOTE — FLOWSHEET NOTE
[] [facility]) - [facility] &  Therapy  [address]  [phone]  F: [phone] [x] [address]  [address]   [address]  P: [phone]  F: [phone] [] [address] &  Therapy  [address]  P: [phone]  F: [phone] [] [address]  P: [phone]  F: [phone] [] [address]  [facility]   [address]   [place]: [phone]  F: [phone]      Physical Therapy Daily Treatment Note    Date:  2021  Patient Name:  Greg Freedman    :  1945  MRN: [id number]  Physician: Dr. Jaqueline Lujan MD               Insurance: Medicare primary, [address] [place] [address] secondary  Medical Diagnosis: M54.5 - Chronic low back pain               Rehab Codes: M54.5, M62.81, R26.2  Onset date: 2021 referral                        Next 's appt. :   Visit# / total visits: ;    Progress note for Medicare patient due at visit 10    Cancels/No Shows: 0/0    Subjective:    Pain:  [x] Yes  [] No Location: Low back pain Pain Rating: (0-10 scale) \"irritated\"/10  Pain altered Tx:  [x] No  [] Yes  Action:  Comments: Pt arrives reporting that her back is feeling a little irritated this date. Pt notes that she performed several errands and running around prior to arriving to therapy. Pt notes her neck did not hurt after last session.        Objective:  Modalities:   Precautions:  Exercises: bold exercises completed 21  Exercise Reps/ Time Weight/ Level Comments   scifit  8 min  Lv 2.5           Supine      HS stretch  3x30\" ea     Piriformis stretch 3x30\" ea     LTR 10x5\" ea OP from clinician    Bridges 10x2     [address] 5x10\"     TrA + knee fall out 10x     Transverse Abdominus (TrA) contraction 10x5\"     TrA + hip abd 20x [employer]  resistance 7/29   TrA + hip add 20x ball    TrA + SLR 10xea A                Sidelying       Hip abd 15x     Clamshells 2x10  Blue  Added resistance 7/26, inc resistance 8/13   Reverse clams 2x10    Added 7/26                 Seated        TrA + march 10xea       scap retraction 10x5\"                   Standing       tband rows with TrA cont 20x\" Blue    tband ER TrA cont x  Unable to do due to shoulder   tband extension TrA cont 10x3\" Blue     Paloff press 10x ea Blue     paloff press/ chops without wt  15xea   Cueing for TrA contraction added 8/13   3 way hip - bilateral  15x ea Blue  Added 7/26, inc resistance 8/13   Mini squats 10x2  Added 8/2, inc reps 8/13   Other:     Specific Instructions for next treatment: progress hip and core strength.      Completed by primary PT 8/4/2021  STRENGTH   ROM     Left Right Cervical     L1-2 Hip Flex 4 4+ Flexion     Hip Abd 3+ 4 Extension     L3-4 Knee Ext 5 5 Rotation L R   L4 Ankle DF 4 5 Sidebend L R   L5 EHL     Retraction     S1 Plant. Flex     Lumbar     Abdominals Pt able to keep TrA with march in seated unable to keep TrA with march in seated Flexion WFL   Erector Spinae     Extension WFL, increased low back pain         Rotation L  R   Knee flex 5 5 Sidebend L WFL R WFL         UE/LE                                                                                  Treatment Charges: Mins Units   []  Modalities     [x]  Ther Exercise 52 3   []  Manual Therapy     []  Ther Activities     []  Aquatics     []  Vasocompression     []  Other     Total Treatment time 52 3   Estimated Medicare cost as of 8/13/21: $742.55    Assessment: [x] Progressing toward goals. Initiated session with anabel followed by exercises per chart. Progressed core strengthening with seated TrA + marches, and TrA+SLR, increased repetitions of previous exercises per pt tolerance.  Pt with good tolerance to all exercises and progressions with minimal cueing required for appropriate technique throughout session. Patient demonstrates fatigue with bridges and 3 way hip exercises this date, however does not complain of increased back pain this date. [] No change. [] Other:  [x] Patient would continue to benefit from skilled physical therapy services in order to: improve hip and core strength in order to improve tolerance to prolonged positioning in standing and improve tolerance to bending activities for ADLs and to decrease low back pain and normalize gait pattern. Problems:    [x]? ? Pain: L side low back, 0-7/10  [x]? ? ROM: impaired lumbar extension and L DF  [x]? ? Strength: core and hip weakness  [x]? ? Function: Oswestry = 52% functional impairment  [x]? Other: Trendelenberg gait pattern and L foot drop/increased pronation       STG: (to be met in 6 treatments) - STGs assessed by primary PT 8/4/2021  1. ? Pain: Pt will report decreased maximal pain levels to <5/10 following standing for 15 minutes or repetitive lifting while doing the laundry. - Progress made 8/4/2021, pt reports that pain greater than 5/10 does not occur as frequently  2. ? ROM: Pt will demonstrate full lumbar AROM without increased pain noted with 5 repetitions in each direction in order to improve tolerance to prolonged positioning and repetitive bending tasks. - Progress made 8/4/2021, increased low back discomfort with repetitive extension  3. ? Strength: Pt will increase core strength as evident by ability to maintain transverse abdominus activation with seated march in order to improve control with seated and standing exercises and decrease strain placed on low back. - Partially met 8/4/2021, able to achieve on the R side, unable to maintain on L side  4. ? Function: Pt will demonstrate improved functional activity tolerance as evident by an improved score on the Oswestry to <40% functional impairment. - MET 8/4/2021, currently 30% functional impairment  5.  Patient to be independent with home exercise program as demonstrated by performance with correct form without cues. - Ongoing 8/4/2021, pt requires intermittent cues for proper demonstration of exercises  LTG: (to be met in 12 treatments)  1. Pt will demonstrate improved tolerance to ambulation reporting ability to ambulate for 15 minutes without increased pain and patient will demonstrate improved gait mechanics as evident by reduction of Trendelenberg pattern. 2. Pt will increase hip strength to 4/5 globally in order to improve standing and bending ability with decreased strain placed on low back. 3. Pt will demonstrate improved functional activity tolerance as evident by an improved score on the Oswestry to <30% functional impairment. - MET 8/4/2021, currently 30% functional impairment                    Patient goals: \"reduced pain while standing or bending\"    Pt. Education:  [] Yes  [x] No  [] Reviewed Prior HEP/Ed  Method of Education: [] Verbal  [] Demo  [] Written  7/14/2021 AT [facility] for charted exercises; [employer]: [id number]    Access Code: [email or web address]  URL: [email or web address]  Date: 07/29/2021  Prepared by: Silvia Skelton    Exercises  Supine Bridge - 1 x daily - 7 x weekly - 3 sets - 10 reps  Clamshell - 1 x daily - 7 x weekly - 3 sets - 10 reps  Supine Lower Trunk Rotation - 1 x daily - 7 x weekly - 3 sets - 10 reps  Supine Piriformis Stretch with Foot on Ground - 1 x daily - 7 x weekly - 3 sets - 10 reps  Hooklying Clamshell with Resistance - 1 x daily - 7 x weekly - 3 sets - 10 reps  Supine Single Knee to Chest Stretch - 1 x daily - 7 x weekly - 3 sets - 10 reps  Sidelying Hip Abduction - 1 x daily - 7 x weekly - 3 sets - 10 reps  Scapular Retraction with Resistance - 1 x daily - 7 x weekly - 3 sets - 10 reps  Single Arm Shoulder Extension with Anchored Resistance - 1 x daily - 7 x weekly - 3 sets - 10 reps  Standing Hip Extension with Anchored Resistance - 1 x daily - 7 x weekly - 3 sets - 10 reps  Standing Hip Abduction with Anchored Resistance - 1 x daily - 7 x weekly - 3 sets - 10 reps  Standing Hip Flexion with Anchored Resistance and Chair Support - 1 x daily - 7 x weekly - 3 sets - 10 reps      Comprehension of Education:  [] Verbalizes understanding. [] Demonstrates understanding. [] Needs review. [] Demonstrates/verbalizes HEP/Ed previously given. Plan: [x] Continue current frequency toward long and short term goals. [x] Specific Instructions for subsequent treatments: Progress as able. Time In: 5:00 pm            Time Out: 6:00 pm    Electronically signed by:   Sophia Christian PT

## 2021-08-19 ENCOUNTER — HOSPITAL ENCOUNTER (OUTPATIENT)
Dept: PHYSICAL THERAPY | Facility: CLINIC | Age: 76
Setting detail: THERAPIES SERIES
End: 2021-08-19
Payer: MEDICARE

## 2021-08-25 ENCOUNTER — HOSPITAL ENCOUNTER (OUTPATIENT)
Dept: PHYSICAL THERAPY | Facility: CLINIC | Age: 76
Setting detail: THERAPIES SERIES
Discharge: HOME | End: 2021-08-25
Payer: MEDICARE

## 2021-08-25 PROCEDURE — 97110 THERAPEUTIC EXERCISES: CPT

## 2021-08-25 NOTE — FLOWSHEET NOTE
[] [facility]) [facility] TWELVESTEP [facility] &  Therapy  [address]  [phone]  F: [phone] [x] [address]  [address]   [address]  P: [phone]  F: [phone] [] [address] &  Therapy  [address]  P: [phone]  F: [phone] [] [address]  P: [phone]  F: [phone] [] [address]  [facility]   [address]   [place]: [phone]  F: [phone]      Physical Therapy Daily Treatment Note    Date:  2021  Patient Name:  Alfonso Scherer    :  1945  MRN: [id number]  Physician: Dr. Anton Gomez MD               Insurance: Medicare primary, [address] [place] [address] secondary  Medical Diagnosis: M54.5 - Chronic low back pain               Rehab Codes: M54.5, M62.81, R26.2  Onset date: 2021 referral                        Next 's appt. :   Visit# / total visits: 10/12;    Progress note for Medicare patient due at visit 12    Cancels/No Shows: 0/0    Subjective:    Pain:  [x] Yes  [] No Location: Low back pain Pain Rating: (0-10 scale) no numerical/10  Pain altered Tx:  [x] No  [] Yes  Action:  Comments: Pt continues to report that her pain \"comes and goes. \"     Objective:  Modalities:   Precautions:  Exercises: bold exercises completed 21  Exercise Reps/ Time Weight/ Level Comments   scifit  8 min  Lv 2.5           Supine      HS stretch  3x30\" ea     Piriformis stretch 3x30\" ea     LTR 10x5\" ea OP from clinician    Bridges 10x2 Blue Added band    [address] 5x10\"     TrA + knee fall out 10x     Transverse Abdominus (TrA) contraction 10x5\"     TrA + hip abd 20x Blue  Inc resistance    TrA + hip add 20x ball    TrA + SLR 10xea A                Sidelying       Hip abd 15x     Clamshells 2x10  Blue  Added resistance 7/26, inc resistance 8/13   Reverse clams 2x10    Added 7/26                 Seated        TrA + march 10xea       scap retraction 10x5\"                   Standing       tband rows with TrA cont 20x\" Blue    tband ER TrA cont x  Unable to do due to shoulder   tband extension TrA cont 10x3\" Blue     Paloff press 10x ea Blue     paloff press/ chops without wt  15xea   Cueing for TrA contraction added 8/13   3 way hip - bilateral  15x ea Blue  Added 7/26, inc resistance 8/13   Mini squats 10x2  Added 8/2, inc reps 8/13   Step ups (fwd, lat) 10x ea 4\" Added 8/25   Other:     Specific Instructions for next treatment: progress hip and core strength.      Completed by primary PT 8/4/2021  STRENGTH   ROM     Left Right Cervical     L1-2 Hip Flex 4 4+ Flexion     Hip Abd 3+ 4 Extension     L3-4 Knee Ext 5 5 Rotation L R   L4 Ankle DF 4 5 Sidebend L R   L5 EHL     Retraction     S1 Plant. Flex     Lumbar     Abdominals Pt able to keep TrA with march in seated unable to keep TrA with march in seated Flexion WFL   Erector Spinae     Extension WFL, increased low back pain         Rotation L  R   Knee flex 5 5 Sidebend L WFL R WFL         UE/LE                                                                                  Treatment Charges: Mins Units   []  Modalities     [x]  Ther Exercise 48 3   []  Manual Therapy     []  Ther Activities     []  Aquatics     []  Vasocompression     []  Other     Total Treatment time 48 3   Estimated Medicare cost as of 8/25/21: $817.44    Assessment: [x] Progressing toward goals. Added step ups this date to continues strengthening lower extremities with functional tasks. Pt with increased challenged with lateral step ups compared to forward. Intermittent cues provided throughout treatment session to keep core engaged. Pt reporting some R knee discomfort by end of 3 way hip exercises at the end of today's treatment session. [] No change.      [] Other:  [x] Patient would continue to benefit from skilled physical therapy services in order to: improve hip and core strength in order to improve tolerance to prolonged positioning in standing and improve tolerance to bending activities for ADLs and to decrease low back pain and normalize gait pattern. Problems:    [x]? ? Pain: L side low back, 0-7/10  [x]? ? ROM: impaired lumbar extension and L DF  [x]? ? Strength: core and hip weakness  [x]? ? Function: Oswestry = 52% functional impairment  [x]? Other: Trendelenberg gait pattern and L foot drop/increased pronation       STG: (to be met in 6 treatments) - STGs assessed by primary PT 8/4/2021  1. ? Pain: Pt will report decreased maximal pain levels to <5/10 following standing for 15 minutes or repetitive lifting while doing the laundry. - Progress made 8/4/2021, pt reports that pain greater than 5/10 does not occur as frequently  2. ? ROM: Pt will demonstrate full lumbar AROM without increased pain noted with 5 repetitions in each direction in order to improve tolerance to prolonged positioning and repetitive bending tasks. - Progress made 8/4/2021, increased low back discomfort with repetitive extension  3. ? Strength: Pt will increase core strength as evident by ability to maintain transverse abdominus activation with seated march in order to improve control with seated and standing exercises and decrease strain placed on low back. - Partially met 8/4/2021, able to achieve on the R side, unable to maintain on L side  4. ? Function: Pt will demonstrate improved functional activity tolerance as evident by an improved score on the Oswestry to <40% functional impairment. - MET 8/4/2021, currently 30% functional impairment  5. Patient to be independent with home exercise program as demonstrated by performance with correct form without cues. - Ongoing 8/4/2021, pt requires intermittent cues for proper demonstration of exercises  LTG: (to be met in 12 treatments)  1.  Pt will demonstrate improved tolerance to ambulation reporting ability to ambulate for 15 minutes without increased pain and patient will demonstrate improved gait mechanics as evident by reduction of Trendelenberg pattern. 2. Pt will increase hip strength to 4/5 globally in order to improve standing and bending ability with decreased strain placed on low back. 3. Pt will demonstrate improved functional activity tolerance as evident by an improved score on the Oswestry to <30% functional impairment. - MET 8/4/2021, currently 30% functional impairment                    Patient goals: \"reduced pain while standing or bending\"    Pt. Education:  [] Yes  [x] No  [] Reviewed Prior HEP/Ed  Method of Education: [] Verbal  [] Demo  [] Written  7/14/2021 AT [facility] for charted exercises; [employer]: [email or web address]    Access Code: [email or web address]  URL: [email or web address]  Date: 07/29/2021  Prepared by: Dinorah Mock    Exercises  Supine Bridge - 1 x daily - 7 x weekly - 3 sets - 10 reps  Clamshell - 1 x daily - 7 x weekly - 3 sets - 10 reps  Supine Lower Trunk Rotation - 1 x daily - 7 x weekly - 3 sets - 10 reps  Supine Piriformis Stretch with Foot on Ground - 1 x daily - 7 x weekly - 3 sets - 10 reps  Hooklying Clamshell with Resistance - 1 x daily - 7 x weekly - 3 sets - 10 reps  Supine Single Knee to Chest Stretch - 1 x daily - 7 x weekly - 3 sets - 10 reps  Sidelying Hip Abduction - 1 x daily - 7 x weekly - 3 sets - 10 reps  Scapular Retraction with Resistance - 1 x daily - 7 x weekly - 3 sets - 10 reps  Single Arm Shoulder Extension with Anchored Resistance - 1 x daily - 7 x weekly - 3 sets - 10 reps  Standing Hip Extension with Anchored Resistance - 1 x daily - 7 x weekly - 3 sets - 10 reps  Standing Hip Abduction with Anchored Resistance - 1 x daily - 7 x weekly - 3 sets - 10 reps  Standing Hip Flexion with Anchored Resistance and Chair Support - 1 x daily - 7 x weekly - 3 sets - 10 reps      Comprehension of Education:  [] Verbalizes understanding.   []

## 2021-08-31 ENCOUNTER — HOSPITAL ENCOUNTER (OUTPATIENT)
Dept: PHYSICAL THERAPY | Facility: CLINIC | Age: 76
Setting detail: THERAPIES SERIES
Discharge: HOME | End: 2021-08-31
Payer: MEDICARE

## 2021-08-31 PROCEDURE — 97110 THERAPEUTIC EXERCISES: CPT

## 2021-08-31 NOTE — FLOWSHEET NOTE
[] [facility]) - [facility] TWELVESTEP [facility] &  Therapy  [address]  [phone]  F: [phone] [x] [address]  [facility] [address]   [address]  P: [phone]  F: [phone] [] [address] &  Therapy  [address]  P: [phone]  F: [phone] [] [address]  P: [phone]  F: [phone] [] [address]  [facility]   [address]   [place]: [phone]  F: [phone]      Physical Therapy Daily Treatment Note    Date:  2021  Patient Name:  Alexandru Jorgensen    :  1945  MRN: [id number]  Physician: Dr. Elder Goddard MD               Insurance: Medicare primary, [address] [place] [address] secondary  Medical Diagnosis: M54.5 - Chronic low back pain               Rehab Codes: M54.5, M62.81, R26.2  Onset date: 2021 referral                        Next Dr's appt. :   Visit# / total visits: ;    Progress note for Medicare patient due at visit 12    Cancels/No Shows: 0/0    Subjective:    Pain:  [x] Yes  [] No Location: Low back pain Pain Rating: (0-10 scale) no numerical/10  Pain altered Tx:  [x] No  [] Yes  Action:  Comments: Pt arrives noting she felt good this morning but did house work. Feels pretty good right now.        Objective:  Modalities:   Precautions:  Exercises: bold exercises completed 21  Exercise Reps/ Time Weight/ Level Comments   scifit  8 min  Lv 2.5           Supine      HS stretch  3x30\" ea     Piriformis stretch 3x30\" ea     LTR 10x5\" ea OP from clinician    Bridges 10x2 Blue Added band    [address] 5x10\"     TrA + knee fall out 10x     Transverse Abdominus (TrA) contraction 10x5\"     TrA + hip abd 20x Blue  Inc resistance    TrA + hip add 20x ball    TrA + SLR 10x2ea A Progressed                Sidelying Hip abd 15x     Clamshells 2x10  Blue  Added resistance 7/26, inc resistance 8/13   Reverse clams 2x10    Added 7/26                 Seated        TrA + march 20xea    Progressed 8/31   scap retraction 10x5\"                   Standing       tband rows with TrA cont 20x\" Blue    tband ER TrA cont x  Unable to do due to shoulder   tband extension TrA cont 10x3\" Blue     Paloff press 10x ea Blue     paloff press/ chops without wt  15xea   Cueing for TrA contraction added 8/13   3 way hip - bilateral  15x ea Blue  Added 7/26, inc resistance 8/13   Mini squats 10x2  Added 8/2, inc reps 8/13   Step ups (fwd, lat) 10x ea 6\" Added 8/25 progressed  8/31   Other:     Specific Instructions for next treatment: progress hip and core strength.      Completed by primary PT 8/4/2021  STRENGTH   ROM     Left Right Cervical     L1-2 Hip Flex 4 4+ Flexion     Hip Abd 3+ 4 Extension     L3-4 Knee Ext 5 5 Rotation L R   L4 Ankle DF 4 5 Sidebend L R   L5 EHL     Retraction     S1 Plant. Flex     Lumbar     Abdominals Pt able to keep TrA with march in seated unable to keep TrA with march in seated Flexion WFL   Erector Spinae     Extension WFL, increased low back pain         Rotation L  R   Knee flex 5 5 Sidebend L WFL R WFL         UE/LE                                                                                  Treatment Charges: Mins Units   []  Modalities     [x]  Ther Exercise 50 3   []  Manual Therapy     []  Ther Activities     []  Aquatics     []  Vasocompression     []  Other     Total Treatment time 50 3   Estimated Medicare cost as of 8/31/21: $980.20    Assessment: [x] Progressing toward goals. Small progressions made again through out session. Continues to require cueing to ensure core activation through out session with questionable carry over. Pt again reports R knee discomfort end of session however notes it is not related to therapy session today. [] No change.      [] Other:  [x] Patient would continue to benefit from skilled physical therapy services in order to: improve hip and core strength in order to improve tolerance to prolonged positioning in standing and improve tolerance to bending activities for ADLs and to decrease low back pain and normalize gait pattern. Problems:    [x]? ? Pain: L side low back, 0-7/10  [x]? ? ROM: impaired lumbar extension and L DF  [x]? ? Strength: core and hip weakness  [x]? ? Function: Oswestry = 52% functional impairment  [x]? Other: Trendelenberg gait pattern and L foot drop/increased pronation       STG: (to be met in 6 treatments) - STGs assessed by primary PT 8/4/2021  1. ? Pain: Pt will report decreased maximal pain levels to <5/10 following standing for 15 minutes or repetitive lifting while doing the laundry. - Progress made 8/4/2021, pt reports that pain greater than 5/10 does not occur as frequently  2. ? ROM: Pt will demonstrate full lumbar AROM without increased pain noted with 5 repetitions in each direction in order to improve tolerance to prolonged positioning and repetitive bending tasks. - Progress made 8/4/2021, increased low back discomfort with repetitive extension  3. ? Strength: Pt will increase core strength as evident by ability to maintain transverse abdominus activation with seated march in order to improve control with seated and standing exercises and decrease strain placed on low back. - Partially met 8/4/2021, able to achieve on the R side, unable to maintain on L side  4. ? Function: Pt will demonstrate improved functional activity tolerance as evident by an improved score on the Oswestry to <40% functional impairment. - MET 8/4/2021, currently 30% functional impairment  5. Patient to be independent with home exercise program as demonstrated by performance with correct form without cues. - Ongoing 8/4/2021, pt requires intermittent cues for proper demonstration of exercises  LTG: (to be met in 12 treatments)  1.  Pt will demonstrate improved tolerance to ambulation reporting ability to ambulate for 15 minutes without increased pain and patient will demonstrate improved gait mechanics as evident by reduction of Trendelenberg pattern. 2. Pt will increase hip strength to 4/5 globally in order to improve standing and bending ability with decreased strain placed on low back. 3. Pt will demonstrate improved functional activity tolerance as evident by an improved score on the Oswestry to <30% functional impairment. - MET 8/4/2021, currently 30% functional impairment                    Patient goals: \"reduced pain while standing or bending\"    Pt. Education:  [] Yes  [x] No  [] Reviewed Prior HEP/Ed  Method of Education: [] Verbal  [] Demo  [] Written  7/14/2021 AT [facility] for charted exercises; [employer]: [id number]    Access Code: [email or web address]  URL: [email or web address]  Date: 07/29/2021  Prepared by: Simone Schwab    Exercises  Supine Bridge - 1 x daily - 7 x weekly - 3 sets - 10 reps  Clamshell - 1 x daily - 7 x weekly - 3 sets - 10 reps  Supine Lower Trunk Rotation - 1 x daily - 7 x weekly - 3 sets - 10 reps  Supine Piriformis Stretch with Foot on Ground - 1 x daily - 7 x weekly - 3 sets - 10 reps  Hooklying Clamshell with Resistance - 1 x daily - 7 x weekly - 3 sets - 10 reps  Supine Single Knee to Chest Stretch - 1 x daily - 7 x weekly - 3 sets - 10 reps  Sidelying Hip Abduction - 1 x daily - 7 x weekly - 3 sets - 10 reps  Scapular Retraction with Resistance - 1 x daily - 7 x weekly - 3 sets - 10 reps  Single Arm Shoulder Extension with Anchored Resistance - 1 x daily - 7 x weekly - 3 sets - 10 reps  Standing Hip Extension with Anchored Resistance - 1 x daily - 7 x weekly - 3 sets - 10 reps  Standing Hip Abduction with Anchored Resistance - 1 x daily - 7 x weekly - 3 sets - 10 reps  Standing Hip Flexion with Anchored Resistance and Chair Support - 1 x daily - 7 x weekly - 3 sets - 10 reps      Comprehension of Education:  [] [place] understanding. [] Demonstrates understanding. [] Needs review. [] Demonstrates/verbalizes HEP/Ed previously given. Plan: [x] Continue current frequency toward long and short term goals. [x] Specific Instructions for subsequent treatments: Progress as able.        Time In: 300 pm            Time Out: 400 pm    Electronically signed by:  Dilip Hess PTA

## 2021-09-03 ENCOUNTER — HOSPITAL ENCOUNTER (OUTPATIENT)
Dept: PHYSICAL THERAPY | Facility: CLINIC | Age: 76
Setting detail: THERAPIES SERIES
Discharge: HOME | End: 2021-09-03
Payer: MEDICARE

## 2021-09-03 PROCEDURE — 97110 THERAPEUTIC EXERCISES: CPT

## 2021-09-03 NOTE — DISCHARGE SUMMARY
[] [facility]) - [facility] &  Therapy  [address]  [phone]  F: [phone] [x] [address]  [address]   [address]  P: [phone]  F: [phone] [] [address]  [address]  P: [phone]  F: [phone] [] [address]  P: [phone]  F: [phone] [] [address]  [facility]   [address]   [place]: [phone]  F: [phone]      Physical Therapy Discharge Note    Date: 9/3/2021      Patient: Ryan Sainz  : 1945  MRN: [id number]    Physician: Dr. Gerson Bar MD               Insurance: Medicare primary, [address] [place] [address] secondary  Medical Diagnosis: M54.5 - Chronic low back pain                                      Rehab Codes: M54.5, M62.81, R26.2  Onset date: 2021 referral                        Next 's appt. :   Visit# / total visits: ;                             Progress note for Medicare patient due at visit 12                          Cancels/No Shows: 0/0  Date of initial visit: 2021                Date of final visit: 9/3/2021      Subjective:  Pain:  [x]? Yes  []? No   Location: Low back pain         Pain Rating: (0-10 scale) no numerical/10  Pain altered Tx:  [x]? No  []? Yes  Action:  Comments: Pt reports minimal changes in symptoms since beginning PT. She does not slight improvements in standing to do ADL's and prolonged walking. Pt continues to be challenged with lifting objects such as groceries into the house. Pt reports some improvement with repetitive bending activities such as when doing laundry. Pt reports pain increased to 2/10 yesterday following unloading groceries and then going to sit down for a break.        Objective:             Completed by primary PT 9/3/2021  STRENGTH   ROM     Left Right Cervical     L1-2 Hip Flex 4 4+ Flexion     Hip Abd 3+ 4- Extension     L3-4 Knee Ext 5 5 Rotation L R   L4 Ankle DF 4 5 Sidebend L R   L5 EHL     Retraction     S1 Plant. Flex     Lumbar     Abdominals Pt able to keep TrA with march in seated unable to keep TrA with SLR Flexion WFL   Erector Spinae     Extension WFL         Rotation L  R   Knee flex 5 5 Sidebend L WFL R WFL         UE/LE                                                                                   Assessment: Pt has demonstrated improvement in LE strength and core stability. Pt now is able to perform lumbar AROM without increased pain at end range. Good prognosis with monitoring of positioning with prolonged bending tasks and with prolonged standing and walking. STG: (to be met in 6 treatments)  1. ? Pain: Pt will report decreased maximal pain levels to <5/10 following standing for 15 minutes or repetitive lifting while doing the laundry. - MET 9/3/2021  2. ? ROM: Pt will demonstrate full lumbar AROM without increased pain noted with 5 repetitions in each direction in order to improve tolerance to prolonged positioning and repetitive bending tasks. - MET 9/3/2021  3. ? Strength: Pt will increase core strength as evident by ability to maintain transverse abdominus activation with seated march in order to improve control with seated and standing exercises and decrease strain placed on low back. - Partially met 9/3/2021, able to achieve on the R side, unable to maintain on L side  4. ? Function: Pt will demonstrate improved functional activity tolerance as evident by an improved score on the Oswestry to <40% functional impairment. - MET 8/4/2021, currently 30% functional impairment  5. Patient to be independent with home exercise program as demonstrated by performance with correct form without cues.  - Ongoing 9/3/2021, pt requires intermittent cues for proper demonstration of exercises  LTG: (to be met in 12 treatments)  1. Pt will demonstrate improved tolerance to ambulation reporting ability to ambulate for 15 minutes without increased pain and patient will demonstrate improved gait mechanics as evident by reduction of Trendelenberg pattern. - MET 9/3/2021, pt reports that she was in the grocery store for an hour yesterday with back pain at 2/10 afterwards  2. Pt will increase hip strength to 4/5 globally in order to improve standing and bending ability with decreased strain placed on low back. - Not met 9/3/2021, pt continues to demonstrate impaired hip abd  3. Pt will demonstrate improved functional activity tolerance as evident by an improved score on the Oswestry to <30% functional impairment. - MET 8/4/2021, currently 30% functional impairment                    Patient goals: \"reduced pain while standing or bending\"       Treatment to Date:  [x] Therapeutic Exercise    [] Modalities:  [] Therapeutic Activity    [] Ultrasound  [] Electrical Stimulation  [] Gait Training     [] Massage       [] Lumbar/Cervical Traction  [] Neuromuscular Re-education [] Cold/hotpack [] Iontophoresis: 4 mg/mL  [x] Instruction in Home Exercise Program                     Dexamethasone Sodium  [] Manual Therapy             Phosphate 40-80 mAmin  [] Aquatic Therapy                   [] Vasocompression/    [] Other:             Game Ready    Discharge Status:     [] Pt recovered from conditions. Treatment goals were met. [x] Pt received maximum benefit. No further therapy indicated at this time. [x] Pt to continue exercise/home instructions independently. [] Therapy interrupted due to:    [] Pt has 2 or more no shows/cancels, is discontinued per our policy. [x] Pt has completed prescribed number of treatment sessions. [] Other:         Electronically signed by Alesha Wells PT on 9/3/2021     If you have any questions or concerns, please don't hesitate to call.   Thank you for your referral.

## 2021-09-03 NOTE — FLOWSHEET NOTE
Piriformis stretch 3x30\" ea     LTR 10x5\" ea OP from clinician    Bridges 10x2 Blue Added band 8/25   [address] 5x10\"     TrA + knee fall out 10x     Transverse Abdominus (TrA) contraction 10x5\"     TrA + hip abd 20x Blue  Inc resistance 7/29   TrA + hip add 20x ball    TrA + SLR 10x2ea A Progressed 8/31               Sidelying       Hip abd 15x     Clamshells 2x10  Blue  Added resistance 7/26, inc resistance 8/13   Reverse clams 2x10    Added 7/26                 Seated        TrA + march 20xea    Progressed 8/31   scap retraction 10x5\"                   Standing       tband rows with TrA cont 20x\" Blue    tband ER TrA cont x  Unable to do due to shoulder   tband extension TrA cont 10x3\" Blue     Paloff press 10x ea Blue     paloff press/ chops without wt  15xea   Cueing for TrA contraction added 8/13   3 way hip - bilateral  15x ea Blue  Added 7/26, inc resistance 8/13   Mini squats 10x2  Added 8/2, inc reps 8/13   Step ups (fwd, lat) 10x ea 6\" Added 8/25 progressed  8/31   Other:     Specific Instructions for next treatment: progress hip and core strength.      Completed by primary PT 9/3/2021  STRENGTH   ROM     Left Right Cervical     L1-2 Hip Flex 4 4+ Flexion     Hip Abd 3+ 4- Extension     L3-4 Knee Ext 5 5 Rotation L R   L4 Ankle DF 4 5 Sidebend L R   L5 EHL     Retraction     S1 Plant. Flex     Lumbar     Abdominals Pt able to keep TrA with march in seated unable to keep TrA with SLR Flexion WFL   Erector Spinae     Extension WFL         Rotation L  R   Knee flex 5 5 Sidebend L WFL R WFL         UE/LE                                                                                  Treatment Charges: Mins Units   []  Modalities     [x]  Ther Exercise 48 3   []  Manual Therapy     []  Ther Activities     []  Aquatics     []  Vasocompression     []  Other     Total Treatment time 48 3   Estimated Medicare cost as of 9/3/21: $440.83    Assessment: [x] Progressing toward goals.  Continued with most recent 9/3/2021, pt requires intermittent cues for proper demonstration of exercises  LTG: (to be met in 12 treatments)  1. Pt will demonstrate improved tolerance to ambulation reporting ability to ambulate for 15 minutes without increased pain and patient will demonstrate improved gait mechanics as evident by reduction of Trendelenberg pattern. - MET 9/3/2021, pt reports that she was in the grocery store for an hour yesterday with back pain at 2/10 afterwards  2. Pt will increase hip strength to 4/5 globally in order to improve standing and bending ability with decreased strain placed on low back. - Not met 9/3/2021, pt continues to demonstrate impaired hip abd  3. Pt will demonstrate improved functional activity tolerance as evident by an improved score on the Oswestry to <30% functional impairment. - MET 8/4/2021, currently 30% functional impairment                    Patient goals: \"reduced pain while standing or bending\"    Pt. Education:  [x] Yes  [] No  [] Reviewed Prior HEP/Ed  Method of Education: [x] Verbal  [x] Demo  [] Written  7/14/2021 AT [facility] for charted exercises; [employer]: [email or web address]    Access Code: [email or web address]  URL: [email or web address]  Date: 07/29/2021  Prepared by: Dinorah Mock    Exercises  Supine Bridge - 1 x daily - 7 x weekly - 3 sets - 10 reps  Clamshell - 1 x daily - 7 x weekly - 3 sets - 10 reps  Supine Lower Trunk Rotation - 1 x daily - 7 x weekly - 3 sets - 10 reps  Supine Piriformis Stretch with Foot on Ground - 1 x daily - 7 x weekly - 3 sets - 10 reps  Hooklying Clamshell with Resistance - 1 x daily - 7 x weekly - 3 sets - 10 reps  Supine Single Knee to Chest Stretch - 1 x daily - 7 x weekly - 3 sets - 10 reps  Sidelying Hip Abduction - 1 x daily - 7 x weekly - 3 sets - 10 reps  Scapular Retraction with Resistance - 1 x daily - 7 x weekly - 3 sets - 10 reps  Single Arm Shoulder Extension with Anchored Resistance - 1 x daily - 7 x weekly - 3 sets - 10 reps  Standing Hip Extension with Anchored Resistance - 1 x daily - 7 x weekly - 3 sets - 10 reps  Standing Hip Abduction with Anchored Resistance - 1 x daily - 7 x weekly - 3 sets - 10 reps  Standing Hip Flexion with Anchored Resistance and Chair Support - 1 x daily - 7 x weekly - 3 sets - 10 reps      Comprehension of Education:  [] Verbalizes understanding. [] Demonstrates understanding. [] Needs review. [x] Demonstrates/verbalizes HEP/Ed previously given. Plan: [x] Continue current frequency toward long and short term goals.     [x] Specific Instructions for subsequent treatments: D/C      Time In: 1043 am            Time Out: 12:23 pm    Electronically signed by:  Alana Dodd PT

## 2022-01-19 ENCOUNTER — TELEPHONE (OUTPATIENT)
Dept: ORTHOPEDIC SURGERY | Age: 77
End: 2022-01-19

## 2022-01-19 NOTE — TELEPHONE ENCOUNTER
01/31/2020 : REVERSE TOTAL SHOULDER ARTHROPLASTY, RIGHT    Patient called in stating she has a dental cleaning scheduled next week and would like to know if she is to pre medicate with an abx? William advise?   Patient uses [facility] in [place] on [address]

## 2022-01-20 DIAGNOSIS — Z40.8 ENCOUNTER FOR OTHER PROPHYLACTIC SURGERY: Primary | ICD-10-CM

## 2022-01-20 RX ORDER — AMOXICILLIN AND CLAVULANATE POTASSIUM 875; 125 MG/1; MG/1
1 TABLET, FILM COATED ORAL 2 TIMES DAILY
Qty: 6 TABLET | Refills: 0 | Status: SHIPPED | OUTPATIENT
Start: 2022-01-20 | End: 2022-01-23

## 2022-06-02 ENCOUNTER — OFFICE VISIT (OUTPATIENT)
Dept: PODIATRY | Age: 77
End: 2022-06-02
Payer: MEDICARE

## 2022-06-02 VITALS — BODY MASS INDEX: 29.88 KG/M2 | WEIGHT: 175 LBS | HEIGHT: 64 IN

## 2022-06-02 DIAGNOSIS — M79.605 PAIN IN BOTH LOWER EXTREMITIES: ICD-10-CM

## 2022-06-02 DIAGNOSIS — M79.604 PAIN IN BOTH LOWER EXTREMITIES: ICD-10-CM

## 2022-06-02 DIAGNOSIS — L60.0 INGROWN NAIL: ICD-10-CM

## 2022-06-02 DIAGNOSIS — I73.9 PVD (PERIPHERAL VASCULAR DISEASE) (HCC): ICD-10-CM

## 2022-06-02 DIAGNOSIS — D23.72 BENIGN NEOPLASM OF SKIN OF LEFT FOOT: Primary | ICD-10-CM

## 2022-06-02 DIAGNOSIS — B35.1 DERMATOPHYTOSIS OF NAIL: ICD-10-CM

## 2022-06-02 PROCEDURE — 17110 DESTRUCTION B9 LES UP TO 14: CPT | Performed by: PODIATRIST

## 2022-06-02 PROCEDURE — 11721 DEBRIDE NAIL 6 OR MORE: CPT | Performed by: PODIATRIST

## 2022-06-02 PROCEDURE — 4004F PT TOBACCO SCREEN RCVD TLK: CPT | Performed by: PODIATRIST

## 2022-06-02 PROCEDURE — 1123F ACP DISCUSS/DSCN MKR DOCD: CPT | Performed by: PODIATRIST

## 2022-06-02 PROCEDURE — 1090F PRES/ABSN URINE INCON ASSESS: CPT | Performed by: PODIATRIST

## 2022-06-02 PROCEDURE — 3017F COLORECTAL CA SCREEN DOC REV: CPT | Performed by: PODIATRIST

## 2022-06-02 PROCEDURE — G8417 CALC BMI ABV UP PARAM F/U: HCPCS | Performed by: PODIATRIST

## 2022-06-02 PROCEDURE — 99203 OFFICE O/P NEW LOW 30 MIN: CPT | Performed by: PODIATRIST

## 2022-06-02 PROCEDURE — G8400 PT W/DXA NO RESULTS DOC: HCPCS | Performed by: PODIATRIST

## 2022-06-02 PROCEDURE — G8427 DOCREV CUR MEDS BY ELIG CLIN: HCPCS | Performed by: PODIATRIST

## 2022-06-02 NOTE — PROGRESS NOTES
[address] [facility] PODIATRY  [address] [facility] [address]  Dept: [phone]    NEW PATIENT PROGRESS NOTE  Date of patient's visit: 6/2/2022  Patient's Name:  Lucila Soares YOB: 1945            Patient Care Team:  Chandrika Mccarthy MD as PCP - General        Chief Complaint   Patient presents with    New Patient    Benign Neoplasm     left foot x 1 year         Ankle Pain             left ankle        Swelling              left ankle      HPI:   Lucila Soares is a 77 y.o. female who presents to the office today complaining of painful benign lesion on the left foot. Symptoms began 1 year(s) ago. Patient relates pain is Present. Pain is rated 2 out of 10 and is described as intermittent. Treatments prior to today's visit include: none. Currently denies F/C/N/V. Pt's primary care physician is Chandrika Mccarthy MD last seen December 22 2021. Patient states she has pain and swelling with her left ankle. She had a surgical procedure by dr Danielle bell to remove hardware from her ankle. She states she thinks it may have been 6-7 years ago. She has been in physical therapy in the past after her surgery. Allergies   Allergen Reactions    No Known Allergies        Past Medical History:   Diagnosis Date    Arthritis     Basal cell carcinoma (BCC) of left forearm     Chronic back pain     Depression     Eczema     Hypertension     Hyponatremia     see's nephrologist for this, on lasix for this    Iron deficiency anemia 04/28/2017    just recieved iron infusion for first time, ordered by PCP    Knee pain, right     PONV (postoperative nausea and vomiting)     Rotator cuff disorder     rt shoulder    Snores     Wears glasses        Prior to Admission medications    Medication Sig Start Date End Date Taking?  Authorizing Provider   amoxicillin (AMOXIL) 500 MG capsule Take 2 grams by mouth one hour prior to dental cleaning. 7/7/21  Yes MD Bunny Moyer MISC by Does not apply route Duration 5 years  - July 30, 2025 7/16/20  Yes Lilly Terry MD   vitamin C (VITAMIN C) 500 MG tablet Take 1 tablet by mouth daily 2/1/20  Yes Miles Villa MD   sennosides-docusate sodium (SENOKOT-S) 8.6-50 MG tablet Take 2 tablets by mouth 2 times daily as needed for Constipation 2/1/20  Yes Miles Villa MD   ferrous sulfate 325 (65 Fe) MG EC tablet Take 1 tablet by mouth daily (with breakfast) 2/1/20  Yes Miles Villa MD   folic acid (FOLVITE) 1 MG tablet Take 1 tablet by mouth daily 2/1/20  Yes Miles Villa MD   lisinopril (PRINIVIL;ZESTRIL) 5 MG tablet Take 2.5 mg by mouth daily    Yes Historical Provider, MD   furosemide (LASIX) 20 MG tablet Take 20 mg by mouth daily  6/17/15  Yes Historical Provider, MD   Handicap Placard MISC by Does not apply route Disability parking for 5 years 9/3/15  Yes Lilly Terry MD   alendronate (FOSAMAX) 70 MG tablet Take 70 mg by mouth every 7 days monday 2/7/15  Yes Historical Provider, MD       Past Surgical History:   Procedure Laterality Date    ANKLE ARTHROSCOPY Left 02/06/2015    ANKLE SURGERY Left 8/13/14    open orif    CLOSED TX ULNAR FRACTURE PROX END W MANIPULATION Right 8/10/2018    CLOSED REDUCTION PERC.  PINNING RIGHT DISTAL RADIUS  (K-WIRES) performed by Lilly Terry MD at [facility]      x2    EYE SURGERY Bilateral 04/2017    cataract with lens implants    SHOULDER ARTHROPLASTY Right 01/31/2020    REVERSE TOTAL SHOULDER ARTHROPLASTY     SHOULDER SURGERY Right 1/31/2020    REVERSE TOTAL SHOULDER ARTHROPLASTY performed by Lilly Terry MD at [address] [place] [address] Left 02/2017    2 months ago    TOTAL KNEE ARTHROPLASTY Right 5/19/2017    KNEE TOTAL ARTHROPLASTY - Ivon Resides performed by Lilly Terry MD at [facility] History   Problem Relation Age of Onset    Heart Disease Mother        Social History     Tobacco Use  Smoking status: Current Some Day Smoker     Packs/day: 1.00     Years: 51.00     Pack years: 51.00     Types: Cigarettes    Smokeless tobacco: Never Used   Substance Use Topics    Alcohol use: Yes     Comment: 1 time per month       Review of Systems    Review of Systems:   History obtained from chart review and the patient  General ROS: negative for - chills, fatigue, fever, night sweats or weight gain  Constitutional: Negative for chills, diaphoresis, fatigue, fever and unexpected weight change. Musculoskeletal: Positive for arthralgias, gait problem and joint swelling. Neurological ROS: negative for - behavioral changes, confusion, headaches or seizures. Negative for weakness and numbness. Dermatological ROS: negative for - mole changes, rash  Cardiovascular: Negative for leg swelling. Gastrointestinal: Negative for constipation, diarrhea, nausea and vomiting. Lower Extremity Physical Examination:   Vitals: There were no vitals filed for this visit. General: AAO x 3 in NAD. Dermatologic Exam:  Soft tissue lesion to the plantar left hallux IPJ  with central core and petechiae. Pain on palpation of lesion. Skin No rashes or nodules noted. .   Skin is thin, with flaky sloughing skin as well as decreased hair growth to the lower leg  Small red hemosiderin deposits seen dorsal foot   Musculoskeletal:     1st MPJ ROM decreased, Bilateral.  Muscle strength 5/5, Bilateral.  Pain present upon palpation of toenails 1-5, Bilateral. decreased medial longitudinal arch, Bilateral.  Ankle ROM decreased,Bilateral.    Dorsally contracted digits present digits 2, Bilateral.     Vascular: DP pulses 1/4 bilateral.  PT pulses 0/4 bilateral.   CFT <5 seconds, Bilateral.  Hair growth absent to the level of the digits, Bilateral.  Edema present, Bilateral.  Varicosities absent, Bilateral. Erythema absent, Bilateral    Neurological: Sensation diminshed to light touch to level of digits, Bilateral. Protective sensation intact 6/10 sites via 5.07/10g [place]unier Monofilament, Bilateral.  negative Tinel's, Bilateral.  negative Valleix sign, Bilateral.      Integument: Warm, dry, supple, Bilateral.  Open lesion absent, Bilateral.  Interdigital maceration absent to web spaces 4, Bilateral.  Nails 1-5 left and 1-5 right thickened > 3.0 mm, dystrophic and crumbly, discolored with yellow subungual debris. Fissures absent, Bilateral.       Asessment: Patient is a 77 y.o. female with:    Diagnosis Orders   1. Benign neoplasm of skin of left foot  74057 - [place] DESTRUCTION BENIGN LESIONS UP TO 14   2. Pain in both lower extremities  84618 - [place] DEBRIDEMENT OF NAILS, 6 OR MORE    55867 - [place] DESTRUCTION BENIGN LESIONS UP TO 14   3. PVD (peripheral vascular disease) ([facility] [address]  68077 - [place] DEBRIDEMENT OF NAILS, 6 OR MORE   4. Dermatophytosis of nail  24546 - [place] DEBRIDEMENT OF NAILS, 6 OR MORE   5. Ingrown nail  92076 - [place] DEBRIDEMENT OF NAILS, 6 OR MORE         Plan: Patient examined and evaluated. Current condition and treatment options discussed in detail. Discussed conservative and surgical options with the patient. Nails 1,2,3,4,5 Right and 1,2,3,4,5 Left were debrided and ground smooth with a dremmel. The patient tolerated the procedure well without apparent complications. The lesions were partially excised via 15 blade and silver nitrate was applied under occlusion. The patient tolerated the procedure well and without complication. Advised patient to use vasoline to the area after tomorrow to prevent surrounding tissue irritation. All labs were reviewed and all imagining including the above findings were reviewed PRIOR to the patients arrival and with the patient today. Previous patient encounter was reviewed. Encounters from the patients other medical providers were reviewed and noted. Time was spent educating the patient  on proper care of the feet and ankles.   All the above diagnosis were addressed at todays visit and all questions were answered. A total of 30 minutes was spent with this patients encounter which included charting after the patients visit    Verbal and written instructions given to patient. Contact office with any questions/problems/concerns. RTC in 2month(s). 6/2/2022    Electronically signed by Christa Cross DPM on 6/2/2022 at 2:55 PM  6/2/2022            Patient comes today with painful callous. She feels as if she may have a plantars wart. Patient previously had surgery on left ankle many years ago with hardware removed. She states filling the callous doesn't make a difference. She has some swelling in the left foot but she believes its from previous surgery and it doesn't bother her as much. Dr. Loida Givens had done her surgery over the years.

## 2022-08-08 ENCOUNTER — OFFICE VISIT (OUTPATIENT)
Dept: PODIATRY | Age: 77
End: 2022-08-08
Payer: MEDICARE

## 2022-08-08 VITALS — HEIGHT: 64 IN | BODY MASS INDEX: 29.88 KG/M2 | WEIGHT: 175 LBS

## 2022-08-08 DIAGNOSIS — M79.604 PAIN IN BOTH LOWER EXTREMITIES: ICD-10-CM

## 2022-08-08 DIAGNOSIS — I73.9 PVD (PERIPHERAL VASCULAR DISEASE) (HCC): ICD-10-CM

## 2022-08-08 DIAGNOSIS — M79.605 PAIN IN BOTH LOWER EXTREMITIES: ICD-10-CM

## 2022-08-08 DIAGNOSIS — D23.72 BENIGN NEOPLASM OF SKIN OF LEFT FOOT: Primary | ICD-10-CM

## 2022-08-08 PROCEDURE — 99213 OFFICE O/P EST LOW 20 MIN: CPT | Performed by: PODIATRIST

## 2022-08-08 PROCEDURE — 3017F COLORECTAL CA SCREEN DOC REV: CPT | Performed by: PODIATRIST

## 2022-08-08 PROCEDURE — G8427 DOCREV CUR MEDS BY ELIG CLIN: HCPCS | Performed by: PODIATRIST

## 2022-08-08 PROCEDURE — 4004F PT TOBACCO SCREEN RCVD TLK: CPT | Performed by: PODIATRIST

## 2022-08-08 PROCEDURE — 1123F ACP DISCUSS/DSCN MKR DOCD: CPT | Performed by: PODIATRIST

## 2022-08-08 PROCEDURE — G8417 CALC BMI ABV UP PARAM F/U: HCPCS | Performed by: PODIATRIST

## 2022-08-08 PROCEDURE — G8400 PT W/DXA NO RESULTS DOC: HCPCS | Performed by: PODIATRIST

## 2022-08-08 PROCEDURE — 1090F PRES/ABSN URINE INCON ASSESS: CPT | Performed by: PODIATRIST

## 2022-08-08 NOTE — PROGRESS NOTES
[address] [facility] PODIATRY [facility]  [address] [place] [address]  Dept: [phone]  Dept Fax: [phone]     FOOT PAIN & BENIGN NEOPLASM PROGRESS NOTE  Date of patient's visit: 8/8/2022  Patient's Name:  Aric Marin YOB: 1945            Patient Care Team:  Matthew Sanchez MD as PCP - General  Agus Adame DPM as Physician (Podiatry)  Agus Adame DPM as Physician (Podiatry)          Chief Complaint   Patient presents with    Foot Pain     Bilateral foot     Nail Problem     Toenail trim    Benign Neoplasm     Left foot       Subjective: This Aric Marin comes to clinic for foot and nail care. Pt currently has complaint of thickened, painful, skin lesion to to left great toe that he/she cannot manage by themselves. Pt. Relates pain worse with shoe gear. Pt's primary care physician is Matthew Sanchez, [address] [place] [address] seen 7/25/22. Past Medical History:   Diagnosis Date    Arthritis     Basal cell carcinoma (BCC) of left forearm     Chronic back pain     Depression     Eczema     Hypertension     Hyponatremia     see's nephrologist for this, on lasix for this    Iron deficiency anemia 04/28/2017    just recieved iron infusion for first time, ordered by PCP    Knee pain, right     PONV (postoperative nausea and vomiting)     Rotator cuff disorder     rt shoulder    Snores     Wears glasses          Allergies   Allergen Reactions    No Known Allergies      Current Outpatient Medications on File Prior to Visit   Medication Sig Dispense Refill    amoxicillin (AMOXIL) 500 MG capsule Take 2 grams by mouth one hour prior to dental cleaning.  4 capsule 0    Handicap Placard MISC by Does not apply route Duration 5 years  - July 30, 2025 1 each 0    vitamin C (VITAMIN C) 500 MG tablet Take 1 tablet by mouth daily 30 tablet 1    sennosides-docusate sodium (SENOKOT-S) 8.6-50 MG tablet Take 2 tablets by mouth 2 times daily as needed for Constipation 60 tablet 1    ferrous sulfate 325 (65 Fe) MG EC tablet Take 1 tablet by mouth daily (with breakfast) 30 tablet 2    folic acid (FOLVITE) 1 MG tablet Take 1 tablet by mouth daily 30 tablet 2    lisinopril (PRINIVIL;ZESTRIL) 5 MG tablet Take 2.5 mg by mouth daily       furosemide (LASIX) 20 MG tablet Take 20 mg by mouth daily       Handicap Placard MISC by Does not apply route Disability parking for 5 years 1 each 0    alendronate (FOSAMAX) 70 MG tablet Take 70 mg by mouth every 7 days monday       No current facility-administered medications on file prior to visit. Review of Systems    Review of Systems:   History obtained from chart review and the patient  General ROS: negative for - chills, fatigue, fever, night sweats or weight gain  Constitutional: Negative for chills, diaphoresis, fatigue, fever and unexpected weight change. Musculoskeletal: Positive for arthralgias, gait problem and joint swelling. Neurological ROS: negative for - behavioral changes, confusion, headaches or seizures. Negative for weakness and numbness. Dermatological ROS: negative for - mole changes, rash  Cardiovascular: Negative for leg swelling. Gastrointestinal: Negative for constipation, diarrhea, nausea and vomiting. Objective:  Dermatologic Exam:  Soft tissue lesion to the plantar left hallux with central core and petechiae. Pain on palpation of lesion. Skin No rashes or nodules noted. .   Skin is thin, with flaky sloughing skin as well as decreased hair growth to the lower leg  Small red hemosiderin deposits seen dorsal foot   Musculoskeletal:     1st MPJ ROM decreased, Bilateral.  Muscle strength 5/5, Bilateral.  Pain present upon palpation of toenails 1-5, Bilateral. decreased medial longitudinal arch, Bilateral.  Ankle ROM decreased,Bilateral.    Dorsally contracted digits present digits 2, Bilateral.     Vascular: DP pulses 1/4 bilateral.  PT pulses 0/4 bilateral.   CFT <5 seconds, Bilateral.  Hair growth absent to the level of the digits, Bilateral.  Edema present, Bilateral.  Varicosities absent, Bilateral. Erythema absent, Bilateral    Neurological: Sensation diminshed to light touch to level of digits, Bilateral.  Protective sensation intact 6/10 sites via 5.07/10g [place]-Yunier Monofilament, Bilateral.  negative Tinel's, Bilateral.  negative Valleix sign, Bilateral.      Integument: Warm, dry, supple, Bilateral.  Open lesion absent, Bilateral.  Interdigital maceration absent to web spaces 4, Bilateral.  Nails 1-5 left and 1-5 right thickened > 3.0 mm, dystrophic and crumbly, discolored with subungual debris. Fissures absent, Bilateral.   General: AAO x 3 in NAD. Derm  Toenail Description  Sites of Onychomycosis Involvement (Check affected area)  [x] [x] [x] [x] [x] [x] [x] [x] [x] [x]  5 4 3 2 1 1 2 3 4 5                          Right                                        Left    Thickness  [x] [x] [x] [x] [x] [x] [x] [x] [x] [x]  5 4 3 2 1 1 2 3 4 5                         Right                                        Left    Dystrophic Changes   [x] [x] [x] [x] [x] [x] [x] [x] [x] [x]  5 4 3 2 1 1 2 3 4 5                         Right                                        Left    Color  [x] [x] [x] [x] [x] [x] [x] [x] [x] [x]  5 4 3 2 1 1 2 3 4 5                          Right                                        Left    Incurvation/Ingrowin   [] [] [] [] [] [] [] [] [] []  5 4 3 2 1 1 2 3 4 5                         Right                                        Left    Inflammation/Pain   [x] [x] [x] [x] [x] [x] [x] [x] [x] [x]  5 4 3  2 1 1 2 3 4 5                         Right                                        Left       Nails are painful 1-10 with direct palpation. Q7   []Yes  []No                Q8   [x]Yes  []No                     Q9   []Yes    []No    Assessment:  77 y.o. female with:   No diagnosis found. Diagnosis Orders   1.  Benign neoplasm of skin of left foot        2. Pain in both lower extremities        3. PVD (peripheral vascular disease) ([facility] [address]              Plan:   Pt was evaluated and examined. Patient was given personalized discharge instructions. Recommend application of cantharidin if skin lesion returns. Avoid walking barefoot. All labs were reviewed and all imagining including the above findings were reviewed PRIOR to the patients arrival and with the patient today. Previous patient encounter was reviewed. Encounters from the patients other medical providers were reviewed and noted. Time was spent educating the patient on proper care of the feet and ankles. All the above diagnosis were addressed at todays visit and all questions were answered. A total of 20 minutes was spent with this patients encounter which included charting after the patients visit     Diagnosis was discussed with the pt and all of their questions were answered in detail. Proper foot hygiene and care was discussed with the pt. Patient to check feet daily and contact the office with any questions/problems/concerns. Other comorbidity noted and will be managed by PCP. Pain waiver discussed with patient and confirmed.    8/8/2022          Electronically signed by Edmar Mujica DPM on 8/8/2022 at 2:47 PM  8/8/2022

## 2023-04-21 NOTE — PROGRESS NOTES
[x] [address] [facility]  Outpatient Rehabilitation &  Therapy  [facility] [address]   [address]  P: [phone]  F: [phone]     Physical Therapy Progress Note    Date: 3/30/2020      Patient: Melanie Titus  : 1945  MRN: [id number]    Oanh Marie MD                             Insurance: MEDICARE,  PT ONLY  Medical Diagnosis: Z96.611 (ICD-10-CM) - S/P reverse total shoulder arthroplasty, right      Rehab Codes: M25.511, M25.611, M62.511  Onset Date: DOS 2020                 Next 's appt:2020  Visit# / total visits: ; Progress note for Medicare patient due at visit 12                      Cancels/No Shows: 0/0         Subjective:  Pain:  [x]? Yes  []? No   Location: R shoulder    Pain Rating: (0-10 scale) 1-2/10 with use  Pain altered Tx:  [x]? No  []? Yes  Action:  Comments: Pt  With soreness and stiffness R shoulder in morning and takes tylenol and percocet in AM and in evening. Pt reports she has been on these pain medications for  \"a long time\".      Objective:  Test Measurements:   3/30/2020 ROM            A/P ROM A/P STRENGTH  STRENGTH     LEFT RIGHT LEFT RIGHT   SEATED SHLD FLEX   135 XXXXXXXX XXXXXXXXX   SEATED SHD ABD   113 XXXXXXXX XXXXXXXXX   SEATED IR   Lateral hip XXXXXXXX [id number]               SHLD FLEX   157   4   SHLD ABD   155   4-   SHLD ER   71   nt   SHLD IR   75   4+   SUPRASPINATUS           SHOULDER EXT       3-*   Middle trapezius       3+   rhomboids       3-*               ELBOW FLEX   WNL       ELBOW EXT.   0                      *lacks full AROM due to stiffness; in available ROM strength is 4/5    Function: 3/30/2020: UEFS:61/80 76.25% of normal    Assessment:  STG: (to be met in 6 treatments)  1. ? Pain: pt will report <4/10 right shoulder pain during ROM exercises for improved tolerance to restoration of motion-MET, pt reports 1/10   2. ? ROM: pt to display the following ROM for improved ability to complete overhead PAGES* no...

## 2023-06-21 ENCOUNTER — OFFICE VISIT (OUTPATIENT)
Dept: PODIATRY | Age: 78
End: 2023-06-21
Payer: MEDICARE

## 2023-06-21 VITALS — BODY MASS INDEX: 29.88 KG/M2 | WEIGHT: 175 LBS | HEIGHT: 64 IN

## 2023-06-21 DIAGNOSIS — M79.605 PAIN IN BOTH LOWER EXTREMITIES: ICD-10-CM

## 2023-06-21 DIAGNOSIS — D23.72 BENIGN NEOPLASM OF SKIN OF LEFT FOOT: Primary | ICD-10-CM

## 2023-06-21 DIAGNOSIS — M79.604 PAIN IN BOTH LOWER EXTREMITIES: ICD-10-CM

## 2023-06-21 PROCEDURE — G8417 CALC BMI ABV UP PARAM F/U: HCPCS | Performed by: PODIATRIST

## 2023-06-21 PROCEDURE — G8427 DOCREV CUR MEDS BY ELIG CLIN: HCPCS | Performed by: PODIATRIST

## 2023-06-21 PROCEDURE — 99213 OFFICE O/P EST LOW 20 MIN: CPT | Performed by: PODIATRIST

## 2023-06-21 PROCEDURE — G8400 PT W/DXA NO RESULTS DOC: HCPCS | Performed by: PODIATRIST

## 2023-06-21 PROCEDURE — 1090F PRES/ABSN URINE INCON ASSESS: CPT | Performed by: PODIATRIST

## 2023-06-21 PROCEDURE — 4004F PT TOBACCO SCREEN RCVD TLK: CPT | Performed by: PODIATRIST

## 2023-06-21 PROCEDURE — 1123F ACP DISCUSS/DSCN MKR DOCD: CPT | Performed by: PODIATRIST

## 2023-06-21 PROCEDURE — 17110 DESTRUCTION B9 LES UP TO 14: CPT | Performed by: PODIATRIST

## 2023-06-21 RX ORDER — LISINOPRIL 2.5 MG/1
TABLET ORAL
COMMUNITY
Start: 2023-06-12

## 2023-08-30 ENCOUNTER — OFFICE VISIT (OUTPATIENT)
Dept: PODIATRY | Age: 78
End: 2023-08-30

## 2023-08-30 VITALS — WEIGHT: 155 LBS | HEIGHT: 64 IN | BODY MASS INDEX: 26.46 KG/M2

## 2023-08-30 DIAGNOSIS — M79.604 PAIN IN BOTH LOWER EXTREMITIES: ICD-10-CM

## 2023-08-30 DIAGNOSIS — I73.9 PVD (PERIPHERAL VASCULAR DISEASE) (HCC): ICD-10-CM

## 2023-08-30 DIAGNOSIS — D23.72 BENIGN NEOPLASM OF SKIN OF LEFT FOOT: Primary | ICD-10-CM

## 2023-08-30 DIAGNOSIS — M79.605 PAIN IN BOTH LOWER EXTREMITIES: ICD-10-CM

## 2023-08-30 NOTE — PATIENT INSTRUCTIONS
Schedule a Vaccine  When you qualify to receive the vaccine, call the [facility]) COVID-19 Vaccination Hotline to schedule your appointment or to get additional information about the [facility]) locations which are offering the COVID-19 vaccine. To be 94% effective, it's important that you receive two doses of one of the COVID-19 vaccines. -If you are receiving the [place] vaccine, your second shot will be scheduled as close to 21 days after the first shot as possible. -If you are receiving the Moderna vaccine, your second shot will be scheduled as close to 28 days after the first shot as possible. [facility]) COVID-19 Vaccination Hotline: [phone]    Links to [facility]) website and [facility] website:    [email or web address]    [email or web address]

## 2023-08-30 NOTE — PROGRESS NOTES
the calf muscles is also important for treatment. 1. Stretching: Perform 3-4 times daily, 2-3 minutes per side      -Before getting out of bed, place a towel around the ball of your foot and       pull back, holding for 30 seconds. Repeat with other foot.      -Place a tennis ball on the floor. Roll the tennis ball under your foot for      10-15 minutes. Repeat with other foot. -Perform calf stretches: stand facing a wall with your hands on the wall,      place one leg a step behind the other. Keeping your back heel on the      floor, bend your front knee (lean into the wall), holding for 30 seconds. Repeat with other leg. 2. Icing: Perform 2-3 times daily      -Place a 12 oz plastic water bottle in the freezer. Once frozen, remove     and roll the bottle under your foot for 10-15 min. 3. Anti-inflammatory Medication      -Begin daily regimen of anti-inflammatory medication (Ibuprofen,       Naproxen, Naprosyn) as discussed during your visit. Pt will follow up in 9 weeks or sooner if any problems arise. Diagnosis was discussed with the pt and all of their questions were answered in detail. Proper foot hygiene and care was discussed with the pt. Patient to check feet daily and contact the office with any questions/problems/concerns. Other comorbidity noted and will be managed by PCP. Pain waiver discussed with patient and confirmed.    8/30/2023      Electronically signed by Vincent Cedillo DPM on 8/30/2023 at 2:38 PM  8/30/2023

## 2023-12-09 ENCOUNTER — HOSPITAL ENCOUNTER (EMERGENCY)
Facility: CLINIC | Age: 78
Discharge: HOME | End: 2023-12-09
Attending: EMERGENCY MEDICINE
Payer: MEDICARE

## 2023-12-09 ENCOUNTER — APPOINTMENT (OUTPATIENT)
Dept: GENERAL RADIOLOGY | Facility: CLINIC | Age: 78
End: 2023-12-09
Payer: MEDICARE

## 2023-12-09 VITALS
SYSTOLIC BLOOD PRESSURE: 161 MMHG | WEIGHT: 145 LBS | BODY MASS INDEX: 24.89 KG/M2 | OXYGEN SATURATION: 100 % | RESPIRATION RATE: 18 BRPM | DIASTOLIC BLOOD PRESSURE: 83 MMHG | HEART RATE: 98 BPM | TEMPERATURE: 98.5 F

## 2023-12-09 DIAGNOSIS — S22.42XA CLOSED FRACTURE OF MULTIPLE RIBS OF LEFT SIDE, INITIAL ENCOUNTER: Primary | ICD-10-CM

## 2023-12-09 PROCEDURE — 99283 EMERGENCY DEPT VISIT LOW MDM: CPT

## 2023-12-09 PROCEDURE — 71101 X-RAY EXAM UNILAT RIBS/CHEST: CPT

## 2023-12-09 PROCEDURE — 6370000000 HC RX 637 (ALT 250 FOR IP): Performed by: EMERGENCY MEDICINE

## 2023-12-09 RX ORDER — HYDROCODONE BITARTRATE AND ACETAMINOPHEN 10; 325 MG/15ML; MG/15ML
2.5 SOLUTION ORAL EVERY 4 HOURS PRN
Qty: 30 ML | Refills: 0 | Status: SHIPPED | OUTPATIENT
Start: 2023-12-09 | End: 2023-12-10 | Stop reason: CLARIF

## 2023-12-09 RX ORDER — HYDROCODONE BITARTRATE AND ACETAMINOPHEN 5; 325 MG/1; MG/1
2 TABLET ORAL ONCE
Status: COMPLETED | OUTPATIENT
Start: 2023-12-09 | End: 2023-12-09

## 2023-12-09 RX ADMIN — HYDROCODONE BITARTRATE AND ACETAMINOPHEN 2 TABLET: 5; 325 TABLET ORAL at 16:34

## 2023-12-09 ASSESSMENT — ENCOUNTER SYMPTOMS
RHINORRHEA: 0
DIARRHEA: 0
ABDOMINAL PAIN: 0
COUGH: 0
SHORTNESS OF BREATH: 0
NAUSEA: 0
BACK PAIN: 0
SORE THROAT: 0
VOMITING: 0
EYE PAIN: 0

## 2023-12-09 ASSESSMENT — PAIN DESCRIPTION - DESCRIPTORS: DESCRIPTORS: SHARP

## 2023-12-09 ASSESSMENT — PAIN DESCRIPTION - FREQUENCY: FREQUENCY: CONTINUOUS

## 2023-12-09 ASSESSMENT — PAIN SCALES - GENERAL: PAINLEVEL_OUTOF10: 8

## 2023-12-09 ASSESSMENT — PAIN DESCRIPTION - ORIENTATION: ORIENTATION: LEFT

## 2023-12-09 ASSESSMENT — PAIN DESCRIPTION - PAIN TYPE: TYPE: ACUTE PAIN

## 2023-12-09 ASSESSMENT — PAIN DESCRIPTION - LOCATION: LOCATION: RIB CAGE

## 2023-12-09 ASSESSMENT — PAIN - FUNCTIONAL ASSESSMENT: PAIN_FUNCTIONAL_ASSESSMENT: 0-10

## 2023-12-09 NOTE — DISCHARGE INSTR - COC
Continuity of Care Form    Patient Name: Chad Klein   :  1945  MRN:  [id number]    Admit date:  2023  Discharge date:  ***    Code Status Order: Prior   Advance Directives:     Admitting Physician:  No admitting provider for patient encounter. PCP: Michelle Argueta MD    Discharging Nurse: [facility] Unit/Room#: 106 Rochelle Guillen  Discharging Unit Phone Number: ***    Emergency Contact:   Extended Emergency Contact Information  Primary Emergency Contact: [address] Phone: [phone]  Work Phone: [phone]  Mobile Phone: [phone]  Relation: Child  Secondary Emergency Contact: [address] [place] [address] [address] Phone: [phone]  Work Phone: [phone]  Mobile Phone: [phone]  Relation: Brother/Sister    Past Surgical History:  Past Surgical History:   Procedure Laterality Date    ANKLE ARTHROSCOPY Left 2015    ANKLE SURGERY Left 14    open orif    DILATION AND CURETTAGE OF UTERUS      x2    EYE SURGERY Bilateral 2017    cataract with lens implants    [facility] CLOSED [address] END W/MANJ Right 8/10/2018    CLOSED REDUCTION PERC.  PINNING RIGHT DISTAL RADIUS  (K-WIRES) performed by Ida Whiting MD at [address] [place] Right 2020    REVERSE TOTAL SHOULDER ARTHROPLASTY     SHOULDER SURGERY Right 2020    REVERSE TOTAL SHOULDER ARTHROPLASTY performed by Ida Whiting MD at [address] [facility] Left 2017    2 months ago    TOTAL KNEE ARTHROPLASTY Right 2017    KNEE TOTAL ARTHROPLASTY - Carl Rashi [facility] [address] [facility]       Immunization History:   Immunization History   Administered Date(s) Administered    COVID-19, PFIZER GRAY top, DO NOT Dilute, (age 15 y+), IM, 30 mcg/0.3 mL 2022    COVID-19, PFIZER PURPLE top, DILUTE for use, (age 15 y+), 30mcg/0.3mL 2021, 2021, 10/13/2021       Active Problems:  Patient Active Problem List   Diagnosis Code    Open fracture of

## 2023-12-09 NOTE — ED PROVIDER NOTES
[facility]  [address]  Phone: [phone]    EMERGENCY DEPARTMENT ENCOUNTER          Pt Name: Noé Corrigan  MRN: [id number]  [address] [facility] [place] 1945  Date of evaluation: 12/9/2023      CHIEF COMPLAINT       Chief Complaint   Patient presents with    Rib Pain     Complaining of right sided rib pain after a fall yesterday. She states that she stumbled and fell. She denies dizziness. She states that she fell with outstretched hands then landed on her left side. She states that she hit her cheek on the ground although denies any LOC. Pain increased with movement, deep inspiration, cough and palpation over left ribs. HISTORY OF PRESENT ILLNESS       Noé Corrigan is a 69 y.o. female who presents with pain to her left rib. Yesterday she fell on her left arm and her arm struck her ribs. No head injury. She reports pain to the left lateral chest.  She reports intermittent muscle spasms as well. Movement makes it worse. Rest makes it better. Denies difficulty breathing. Denies any neurologic symptoms. She did drive herself here. She denies other symptoms or concerns. REVIEW OF SYSTEMS       Review of Systems   Constitutional:  Negative for chills, fatigue and fever. HENT:  Negative for rhinorrhea and sore throat. Eyes:  Negative for pain. Respiratory:  Negative for cough and shortness of breath. Gastrointestinal:  Negative for abdominal pain, diarrhea, nausea and vomiting. Genitourinary:  Negative for difficulty urinating. Musculoskeletal:  Negative for back pain and neck pain. Skin:  Negative for rash. Neurological:  Negative for weakness and headaches.         PAST MEDICAL HISTORY    has a past medical history of Arthritis, Basal cell carcinoma (BCC) of left forearm, Chronic back pain, Depression, Eczema, Hypertension, Hyponatremia, Iron deficiency anemia, Knee pain, right, PONV (postoperative nausea and vomiting), Rotator cuff disorder,

## 2023-12-09 NOTE — DISCHARGE INSTRUCTIONS
PLEASE RETURN TO THE EMERGENCY DEPARTMENT IMMEDIATELY if your symptoms worsen in anyway or in 1-2 days if not improved for re-evaluation. You should immediately return to the ER for symptoms such as new or worsening pain, difficulty breathing or swallowing, a change in your voice, a feeling of passing out, light headed, dizziness, chest pain, headache, neck pain, rash, abdominal pain or vomiting. Take your medication as indicated and prescribed. If you are given an antibiotic then, make sure you get the prescription filled and take the antibiotics until finished. Please understand that at this time there is no evidence for a more serious underlying process, but that early in the process of an illness or injury, an emergency department workup can be falsely reassuring. You should contact your family doctor within the next 48 hours for a follow up appointment. [address]!!!    From [facility] ([facility]) and [facility] Emergency [facility]    On behalf of the Emergency Department staff at [facility]), I would like to thank you for giving us the opportunity to address your health care needs and concerns. We hope that during your visit, our service was delivered in a professional and caring manner. Please keep [facility] ([facility]) in mind as we walk with you down the path to your own personal wellness. Please expect an automated text message or email from us so we can ask a few questions about your health and progress. Based on your answers, a clinician may call you back to offer help and instructions. Please understand that early in the process of an illness or injury, an emergency department workup can be falsely reassuring. If you notice any worsening, changing or persistent symptoms please call your family doctor or return to the ER immediately. Tell us how we did during your visit at [email or web address]   and let us know about your experience

## 2023-12-10 RX ORDER — HYDROCODONE BITARTRATE AND ACETAMINOPHEN 5; 325 MG/1; MG/1
1-2 TABLET ORAL EVERY 6 HOURS PRN
Qty: 24 TABLET | Refills: 0 | Status: SHIPPED | OUTPATIENT
Start: 2023-12-10 | End: 2023-12-13

## 2023-12-27 ENCOUNTER — APPOINTMENT (OUTPATIENT)
Dept: GENERAL RADIOLOGY | Facility: CLINIC | Age: 78
End: 2023-12-27
Payer: MEDICARE

## 2023-12-27 ENCOUNTER — HOSPITAL ENCOUNTER (EMERGENCY)
Facility: CLINIC | Age: 78
Discharge: HOME | End: 2023-12-27
Attending: EMERGENCY MEDICINE
Payer: MEDICARE

## 2023-12-27 VITALS
HEIGHT: 64 IN | BODY MASS INDEX: 24.75 KG/M2 | RESPIRATION RATE: 15 BRPM | HEART RATE: 94 BPM | TEMPERATURE: 97.5 F | WEIGHT: 145 LBS | SYSTOLIC BLOOD PRESSURE: 159 MMHG | OXYGEN SATURATION: 98 % | DIASTOLIC BLOOD PRESSURE: 78 MMHG

## 2023-12-27 DIAGNOSIS — S22.42XD: Primary | ICD-10-CM

## 2023-12-27 PROCEDURE — 6370000000 HC RX 637 (ALT 250 FOR IP): Performed by: PHYSICIAN ASSISTANT

## 2023-12-27 PROCEDURE — 71101 X-RAY EXAM UNILAT RIBS/CHEST: CPT

## 2023-12-27 PROCEDURE — 99283 EMERGENCY DEPT VISIT LOW MDM: CPT

## 2023-12-27 RX ORDER — HYDROCODONE BITARTRATE AND ACETAMINOPHEN 5; 325 MG/1; MG/1
1 TABLET ORAL ONCE
Status: COMPLETED | OUTPATIENT
Start: 2023-12-27 | End: 2023-12-27

## 2023-12-27 RX ORDER — LIDOCAINE 4 G/G
1 PATCH TOPICAL DAILY
Qty: 30 PATCH | Refills: 0 | Status: SHIPPED | OUTPATIENT
Start: 2023-12-27 | End: 2024-01-26

## 2023-12-27 RX ORDER — HYDROCODONE BITARTRATE AND ACETAMINOPHEN 5; 325 MG/1; MG/1
1 TABLET ORAL EVERY 6 HOURS PRN
Qty: 12 TABLET | Refills: 0 | Status: SHIPPED | OUTPATIENT
Start: 2023-12-27 | End: 2023-12-30

## 2023-12-27 RX ADMIN — HYDROCODONE BITARTRATE AND ACETAMINOPHEN 1 TABLET: 5; 325 TABLET ORAL at 22:16

## 2023-12-27 ASSESSMENT — PAIN SCALES - GENERAL
PAINLEVEL_OUTOF10: 8
PAINLEVEL_OUTOF10: 5

## 2023-12-27 ASSESSMENT — PAIN DESCRIPTION - ORIENTATION: ORIENTATION: LEFT

## 2023-12-27 ASSESSMENT — PAIN DESCRIPTION - LOCATION: LOCATION: RIB CAGE

## 2023-12-27 ASSESSMENT — PAIN DESCRIPTION - FREQUENCY: FREQUENCY: CONTINUOUS

## 2023-12-27 ASSESSMENT — PAIN - FUNCTIONAL ASSESSMENT: PAIN_FUNCTIONAL_ASSESSMENT: 0-10

## 2023-12-27 ASSESSMENT — PAIN DESCRIPTION - PAIN TYPE: TYPE: ACUTE PAIN

## 2023-12-27 ASSESSMENT — PAIN DESCRIPTION - DESCRIPTORS: DESCRIPTORS: ACHING

## 2023-12-28 NOTE — ED PROVIDER NOTES
[facility]  eMERGENCY dEPARTMENT eNCOUnter      Pt Name: Jennifer Pereyra  MRN: [id number]  Birthdate 1945  Date of evaluation: 12/27/2023  Provider: Driss Elliott PA-C    CHIEF COMPLAINT       Chief Complaint   Patient presents with    Rib Injury     Left x 1 month ago           HISTORY OF PRESENT ILLNESS  (Location/Symptom, Timing/Onset, Context/Setting, Quality, Duration, Modifying Factors, Severity.)   Jennifer Pereyra is a 78 y.o. female who presents to the emergency department complaining of left sided rib pain.  Patient was seen here after a fall about a month ago and diagnosed with 4 rib fractures.  She states she presents today because she feels as if her bones have moved and wanted to get an x-ray.    Denies any other injury, abdominal pain, nausea, vomiting.  States that it feels like throbbing, painful to touch, constant, worse with inspiration and deep breaths, severity mild-moderate       Nursing Notes were reviewed.    REVIEW OF SYSTEMS    (2-9 systems for level 4, 10 or more for level 5)     Review of Systems   Left sided rib pain.    Except as noted above the remainder of the review of systems was reviewed and negative.       PAST MEDICAL HISTORY     Past Medical History:   Diagnosis Date    Arthritis     Basal cell carcinoma (BCC) of left forearm     Chronic back pain     Depression     Eczema     Hypertension     Hyponatremia     see's nephrologist for this, on lasix for this    Iron deficiency anemia 04/28/2017    just recieved iron infusion for first time, ordered by PCP    Knee pain, right     PONV (postoperative nausea and vomiting)     Rotator cuff disorder     rt shoulder    Snores     Wears glasses      None otherwise stated in nurses notes    SURGICAL HISTORY       Past Surgical History:   Procedure Laterality Date    ANKLE ARTHROSCOPY Left 02/06/2015    ANKLE SURGERY Left 8/13/14    open orif    DILATION AND CURETTAGE OF UTERUS      x2    EYE SURGERY Bilateral  healing, subsequent encounter          DISPOSITION/PLAN   DISPOSITION Decision To Discharge    PATIENT REFERRED TO:  Justin Marcelo MD  [address] [place] [address] [address] [facility] [address] [place] [address],[address]  [phone]    Schedule an appointment as soon as possible for a visit in 2 days      [facility]  [address] [address]  [phone]  Go to   If symptoms worsen      DISCHARGE MEDICATIONS:  New Prescriptions    HYDROCODONE-ACETAMINOPHEN (NORCO) 5-325 MG PER TABLET    Take 1 tablet by mouth every 6 hours as needed for Pain for up to 3 days. Intended supply: 3 days.  Take lowest dose possible to manage pain Max Daily Amount: 4 tablets    LIDOCAINE 4 % EXTERNAL PATCH    Place 1 patch onto the skin daily       (Please note that portions of this note were completed with a voice recognition program.  Efforts were made to edit the dictations but occasionally words are mis-transcribed.)    Thiago Elliott PA-C

## 2023-12-28 NOTE — ED NOTES
Pt presents to the ED vi private auto accompanied by her friend. Pt states she tripped and fell approx 1 month prior, found to have several left rib fractures and was treated.  Pt has not followed up with her PCP and continues to c/o pain in left rib cage

## 2023-12-28 NOTE — ED PROVIDER NOTES
[address] [facility]  eMERGENCY dEPARTMENT eNCOUnter   Independent Attestation     Pt Name: Racquel Ware  MRN: [id number]  [address] [facility] 1945  Date of evaluation: 12/27/23       Racquel Ware is a 69 y.o. female who presents with Rib Injury (Left x 1 month ago)        Based on the medical record, the care appears appropriate. I was personally available for consultation in the Emergency Department.     Cory Nicole DO  Attending Emergency  Physician               Cory Nicole DO  12/27/23 2125

## 2025-01-06 ENCOUNTER — OFFICE VISIT (OUTPATIENT)
Dept: PODIATRY | Age: 80
End: 2025-01-06
Payer: MEDICARE

## 2025-01-06 VITALS — BODY MASS INDEX: 24.75 KG/M2 | WEIGHT: 145 LBS | HEIGHT: 64 IN

## 2025-01-06 DIAGNOSIS — M79.605 PAIN IN BOTH LOWER EXTREMITIES: ICD-10-CM

## 2025-01-06 DIAGNOSIS — M79.604 PAIN IN BOTH LOWER EXTREMITIES: ICD-10-CM

## 2025-01-06 DIAGNOSIS — D23.72 BENIGN NEOPLASM OF SKIN OF LEFT FOOT: Primary | ICD-10-CM

## 2025-01-06 PROCEDURE — G8427 DOCREV CUR MEDS BY ELIG CLIN: HCPCS | Performed by: PODIATRIST

## 2025-01-06 PROCEDURE — 1090F PRES/ABSN URINE INCON ASSESS: CPT | Performed by: PODIATRIST

## 2025-01-06 PROCEDURE — 1159F MED LIST DOCD IN RCRD: CPT | Performed by: PODIATRIST

## 2025-01-06 PROCEDURE — 4004F PT TOBACCO SCREEN RCVD TLK: CPT | Performed by: PODIATRIST

## 2025-01-06 PROCEDURE — 1125F AMNT PAIN NOTED PAIN PRSNT: CPT | Performed by: PODIATRIST

## 2025-01-06 PROCEDURE — G8399 PT W/DXA RESULTS DOCUMENT: HCPCS | Performed by: PODIATRIST

## 2025-01-06 PROCEDURE — M1308 PR FLU IMMUNIZE NO ADMIN: HCPCS | Performed by: PODIATRIST

## 2025-01-06 PROCEDURE — 17110 DESTRUCTION B9 LES UP TO 14: CPT | Performed by: PODIATRIST

## 2025-01-06 PROCEDURE — G8420 CALC BMI NORM PARAMETERS: HCPCS | Performed by: PODIATRIST

## 2025-01-06 PROCEDURE — 1123F ACP DISCUSS/DSCN MKR DOCD: CPT | Performed by: PODIATRIST

## 2025-01-06 PROCEDURE — 99213 OFFICE O/P EST LOW 20 MIN: CPT | Performed by: PODIATRIST

## 2025-01-06 NOTE — PROGRESS NOTES
[facility] PODIATRY [address]  [address]  [place] [id number]  Dept: [phone]  Dept Fax: [phone]    RETURN PATIENT PROGRESS NOTE  Date of patient's visit: 1/6/2025  Patient's Name:  Jennifer Pereyra YOB: 1945            Patient Care Team:  Kathrin Marquis MD as PCP - General  Radha Tello DPM as Physician (Podiatry)  Radha Tello DPM as Physician (Podiatry)       Jennifer Pereyra 79 y.o. female that presents for follow-up of   Chief Complaint   Patient presents with    Benign Neoplasm     Left 5th and great toe lesions     Toe Pain     Left great and 5th toe      Pt's primary care physician is Kathrin Marquis MD   Symptoms began a few months ago and are decreased .  Patient relates pain is Absent .  Pain is rated 1 out of 10 and is described as mild.  Treatments prior to today's visit include: None.  Currently denies F/C/N/V.  Patient does relate to walking in slippers at home.  She noticed a dark spot under her left great toe and relates to pain to her left fifth toe.    Allergies   Allergen Reactions    No Known Allergies        Past Medical History:   Diagnosis Date    Arthritis     Basal cell carcinoma (BCC) of left forearm     Chronic back pain     Depression     Eczema     Hypertension     Hyponatremia     see's nephrologist for this, on lasix for this    Iron deficiency anemia 04/28/2017    just recieved iron infusion for first time, ordered by PCP    Knee pain, right     PONV (postoperative nausea and vomiting)     Rotator cuff disorder     rt shoulder    Snores     Wears glasses        Prior to Admission medications    Medication Sig Start Date End Date Taking? Authorizing Provider   diphenhydrAMINE-APAP, sleep, (TYLENOL PM EXTRA STRENGTH)  MG tablet Take 1 tablet by mouth nightly as needed   Yes Provider, MD Enriqueta   lisinopril (PRINIVIL;ZESTRIL) 2.5 MG tablet  6/12/23  Yes Provider,

## 2025-04-09 ENCOUNTER — OFFICE VISIT (OUTPATIENT)
Dept: PODIATRY | Age: 80
End: 2025-04-09
Payer: MEDICARE

## 2025-04-09 VITALS — WEIGHT: 140 LBS | BODY MASS INDEX: 23.9 KG/M2 | HEIGHT: 64 IN

## 2025-04-09 DIAGNOSIS — M79.605 PAIN IN BOTH LOWER EXTREMITIES: ICD-10-CM

## 2025-04-09 DIAGNOSIS — D23.72 BENIGN NEOPLASM OF SKIN OF LEFT FOOT: Primary | ICD-10-CM

## 2025-04-09 DIAGNOSIS — M20.42 HAMMER TOES OF BOTH FEET: ICD-10-CM

## 2025-04-09 DIAGNOSIS — M20.41 HAMMER TOES OF BOTH FEET: ICD-10-CM

## 2025-04-09 DIAGNOSIS — M79.604 PAIN IN BOTH LOWER EXTREMITIES: ICD-10-CM

## 2025-04-09 DIAGNOSIS — I73.9 PVD (PERIPHERAL VASCULAR DISEASE): ICD-10-CM

## 2025-04-09 PROCEDURE — 1159F MED LIST DOCD IN RCRD: CPT | Performed by: PODIATRIST

## 2025-04-09 PROCEDURE — G8420 CALC BMI NORM PARAMETERS: HCPCS | Performed by: PODIATRIST

## 2025-04-09 PROCEDURE — 4004F PT TOBACCO SCREEN RCVD TLK: CPT | Performed by: PODIATRIST

## 2025-04-09 PROCEDURE — 17110 DESTRUCTION B9 LES UP TO 14: CPT | Performed by: PODIATRIST

## 2025-04-09 PROCEDURE — 1125F AMNT PAIN NOTED PAIN PRSNT: CPT | Performed by: PODIATRIST

## 2025-04-09 PROCEDURE — G8427 DOCREV CUR MEDS BY ELIG CLIN: HCPCS | Performed by: PODIATRIST

## 2025-04-09 PROCEDURE — 1090F PRES/ABSN URINE INCON ASSESS: CPT | Performed by: PODIATRIST

## 2025-04-09 PROCEDURE — G8399 PT W/DXA RESULTS DOCUMENT: HCPCS | Performed by: PODIATRIST

## 2025-04-09 PROCEDURE — 99213 OFFICE O/P EST LOW 20 MIN: CPT | Performed by: PODIATRIST

## 2025-04-09 PROCEDURE — 1123F ACP DISCUSS/DSCN MKR DOCD: CPT | Performed by: PODIATRIST

## 2025-04-21 NOTE — PROGRESS NOTES
[facility] PODIATRY [address]  [address]  [facility] [address]  Dept: [phone]  Dept Fax: [phone]     FOOT PAIN & BENIGN NEOPLASM PROGRESS NOTE  Date of patient's visit: 4/21/2025  Patient's Name:  Jennifer Pereyra YOB: 1945            Patient Care Team:  Kathrin Marquis MD as PCP - General  Kathrin Marquis MD as PCP - Empaneled Provider  Radha Tello DPM as Physician (Podiatry)  Radha Tello DPM as Physician (Podiatry)          Chief Complaint   Patient presents with    Benign Neoplasm     Bilateral foot    Foot Pain     Bilateral foot    Toe Pain       Subjective:   This Jennifer Pereyra comes to clinic for foot care.  Pt currently has complaint of thickened, painful, skin lesion that he/she cannot manage by themselves. Pain is at the fifth toe, left. Pt. Relates pain worse with shoe gear.  Pt's primary care physician is Kathrin Marquis MDlasdebra seen 09/01/2022.  Past Medical History:   Diagnosis Date    Arthritis     Basal cell carcinoma (BCC) of left forearm     Chronic back pain     Depression     Eczema     Hypertension     Hyponatremia     see's nephrologist for this, on lasix for this    Iron deficiency anemia 04/28/2017    just recieved iron infusion for first time, ordered by PCP    Knee pain, right     PONV (postoperative nausea and vomiting)     Rotator cuff disorder     rt shoulder    Snores     Wears glasses          Allergies   Allergen Reactions    No Known Allergies      Current Outpatient Medications on File Prior to Visit   Medication Sig Dispense Refill    diphenhydrAMINE-APAP, sleep, (TYLENOL PM EXTRA STRENGTH)  MG tablet Take 1 tablet by mouth nightly as needed      lisinopril (PRINIVIL;ZESTRIL) 2.5 MG tablet       amoxicillin (AMOXIL) 500 MG capsule Take 2 grams by mouth one hour prior to dental cleaning. 4 capsule 0    Handicap Placard MISC by Does not apply route Duration 5

## 2025-07-14 ENCOUNTER — OFFICE VISIT (OUTPATIENT)
Dept: PODIATRY | Age: 80
End: 2025-07-14
Payer: MEDICARE

## 2025-07-14 VITALS — HEIGHT: 64 IN | WEIGHT: 140 LBS | BODY MASS INDEX: 23.9 KG/M2

## 2025-07-14 DIAGNOSIS — S93.402A MILD ANKLE SPRAIN, LEFT, INITIAL ENCOUNTER: ICD-10-CM

## 2025-07-14 DIAGNOSIS — M79.605 PAIN IN BOTH LOWER EXTREMITIES: Primary | ICD-10-CM

## 2025-07-14 DIAGNOSIS — M79.604 PAIN IN BOTH LOWER EXTREMITIES: Primary | ICD-10-CM

## 2025-07-14 PROCEDURE — 1159F MED LIST DOCD IN RCRD: CPT | Performed by: PODIATRIST

## 2025-07-14 PROCEDURE — 1125F AMNT PAIN NOTED PAIN PRSNT: CPT | Performed by: PODIATRIST

## 2025-07-14 PROCEDURE — 1090F PRES/ABSN URINE INCON ASSESS: CPT | Performed by: PODIATRIST

## 2025-07-14 PROCEDURE — 1123F ACP DISCUSS/DSCN MKR DOCD: CPT | Performed by: PODIATRIST

## 2025-07-14 PROCEDURE — G8427 DOCREV CUR MEDS BY ELIG CLIN: HCPCS | Performed by: PODIATRIST

## 2025-07-14 PROCEDURE — G8399 PT W/DXA RESULTS DOCUMENT: HCPCS | Performed by: PODIATRIST

## 2025-07-14 PROCEDURE — G8420 CALC BMI NORM PARAMETERS: HCPCS | Performed by: PODIATRIST

## 2025-07-14 PROCEDURE — 4004F PT TOBACCO SCREEN RCVD TLK: CPT | Performed by: PODIATRIST

## 2025-07-14 PROCEDURE — 99213 OFFICE O/P EST LOW 20 MIN: CPT | Performed by: PODIATRIST

## 2025-07-21 ENCOUNTER — TELEPHONE (OUTPATIENT)
Dept: ORTHOPEDIC SURGERY | Age: 80
End: 2025-07-21

## 2025-07-21 DIAGNOSIS — Z96.611 S/P REVERSE TOTAL SHOULDER ARTHROPLASTY, RIGHT: ICD-10-CM

## 2025-07-21 NOTE — TELEPHONE ENCOUNTER
Patient is a former patient of Dr. Cash and is scheduled for a dental appointment next month, and she is wondering if she will antibiotics, as she has previously taken them in the past.     Please look into this and follow up with patient.

## 2025-07-21 NOTE — TELEPHONE ENCOUNTER
01/31/2020 : REVERSE TOTAL SHOULDER ARTHROPLASTY, RIGHT        Patient getting dental procedure, does she need pre-dental medication. Please advise. Recent patient of Dr. Cash

## 2025-07-21 NOTE — PROGRESS NOTES
[facility] PODIATRY [address]  [address]  [place] [id number]  Dept: [phone]  Dept Fax: [phone]    RETURN PATIENT PROGRESS NOTE  Date of patient's visit: 7/21/2025  Patient's Name:  Jennifer Pereyra YOB: 1945            Patient Care Team:  Kathrin Marquis MD as PCP - General  Kathrin Marquis MD as PCP - Empaneled Provider  Radha Tello DPM as Physician (Podiatry)  Radha Tello DPM as Physician (Podiatry)       Jennifer Pereyra 79 y.o. female that presents for follow-up of   Chief Complaint   Patient presents with    Nail Problem     Toenail trim    Benign Neoplasm     Bilateral foot    Foot Pain     Bilateral foot     Pt's primary care physician is Kathrin Marquis MD Symptoms began a couple week(s) ago and are increased .  Patient relates pain is Present to the left ankle.  Pain is rated 5 out of 10 and is described as intermittent.  Treatments prior to today's visit include: None.  Currently denies F/C/N/V.  She relates to feeling unsteady while walking.  She states that she has some weakness to the ankle joint.    Allergies   Allergen Reactions    No Known Allergies        Past Medical History:   Diagnosis Date    Arthritis     Basal cell carcinoma (BCC) of left forearm     Chronic back pain     Depression     Eczema     Hypertension     Hyponatremia     see's nephrologist for this, on lasix for this    Iron deficiency anemia 04/28/2017    just recieved iron infusion for first time, ordered by PCP    Knee pain, right     PONV (postoperative nausea and vomiting)     Rotator cuff disorder     rt shoulder    Snores     Wears glasses        Prior to Admission medications    Medication Sig Start Date End Date Taking? Authorizing Provider   diphenhydrAMINE-APAP, sleep, (TYLENOL PM EXTRA STRENGTH)  MG tablet Take 1 tablet by mouth nightly as needed   Yes Provider, MD Enriqueta   lisinopril

## 2025-07-22 RX ORDER — AMOXICILLIN 500 MG/1
CAPSULE ORAL
Qty: 4 CAPSULE | Refills: 0 | Status: SHIPPED | OUTPATIENT
Start: 2025-07-22

## 2025-07-22 NOTE — TELEPHONE ENCOUNTER
Called patient, no answer left vmm stating office would send in pre dental antibiotics, need to confirm patient pharmacy.

## (undated) DEVICE — WAX SURG 2.5GM HEMSTAT BNE BEESWAX PARAFFIN ISO PALMITATE

## (undated) DEVICE — GLOVE ORANGE PI 7   MSG9070

## (undated) DEVICE — BNDG,ELSTC,MATRIX,STRL,4"X5YD,LF,HOOK&LP: Brand: MEDLINE

## (undated) DEVICE — ORTHO EXT PK

## (undated) DEVICE — STERILE PVP: Brand: MEDLINE INDUSTRIES, INC.

## (undated) DEVICE — ZIMMER® STERILE DISPOSABLE TOURNIQUET CUFF WITH PROTECTIVE SLEEVE AND PLC, DUAL PORT, SINGLE BLADDER, 34 IN. (86 CM)

## (undated) DEVICE — SUTURE VIC + ABS BR UD X1 2-0 27IN VCP459H

## (undated) DEVICE — BLADE SAGITAL 18X90X1.27MM

## (undated) DEVICE — YANKAUER,FLEXIBLE HANDLE,REGLR CAPACITY: Brand: MEDLINE INDUSTRIES, INC.

## (undated) DEVICE — SYRINGE BLB 2 PC STER 50

## (undated) DEVICE — DRESSING,GAUZE,XEROFORM,CURAD,1"X8",ST: Brand: CURAD

## (undated) DEVICE — 3M™ COBAN™ NL STERILE NON-LATEX SELF-ADHERENT WRAP, 2086S, 6 IN X 5 YD (15 CM X 4,5 M), 12 ROLLS/CASE: Brand: 3M™ COBAN™

## (undated) DEVICE — GLOVE SURG SZ 65 THK91MIL LTX FREE SYN POLYISOPRENE

## (undated) DEVICE — STERILE PATIENT PROTECTIVE PAD FOR IMP® KNEE POSITIONERS & COHESIVE WRAP (10 / CASE): Brand: DE MAYO KNEE POSITIONER®

## (undated) DEVICE — BIT DRL DIA2.7MM PERIPH SCR REUSE FOR COMPHSVE REV SHLDR

## (undated) DEVICE — ZIPPERED TOGA, X-LARGE: Brand: FLYTE

## (undated) DEVICE — ADHESIVE SKIN CLSR 0.7ML TOP DERMBND ADV

## (undated) DEVICE — DRESSING FOAM W4XL10IN AG SIL ADH ANTIMIC POSTOP OPTIFOAM

## (undated) DEVICE — PIN DRL DIA1/8IN QUIK REL FOR VANGUARD UNIV INSTR TOT KNEE 32486265] ZIMMER BIOMET ORTHOPEDICS]

## (undated) DEVICE — SVMMC ORTH SPL DRP PK

## (undated) DEVICE — Device

## (undated) DEVICE — ELECTRODE PT RET AD L9FT HI MOIST COND ADH HYDRGEL CORDED

## (undated) DEVICE — HYPODERMIC SAFETY NEEDLE: Brand: MAGELLAN

## (undated) DEVICE — 3M™ IOBAN™ 2 ANTIMICROBIAL INCISE DRAPE 6650EZ: Brand: IOBAN™ 2

## (undated) DEVICE — SUTURE VIC + UD PS-2 3-0 18IN VCP497G

## (undated) DEVICE — GLOVE ORANGE PI 7 1/2   MSG9075

## (undated) DEVICE — E-Z CLEAN, NON-STICK, PTFE COATED, ELECTROSURGICAL BLADE ELECTRODE, 6.5 INCH (16.5 CM): Brand: MEGADYNE

## (undated) DEVICE — DRAPE C ARM UNIV W41XL74IN CLR PLAS XR VELC CLSR POLY STRP

## (undated) DEVICE — 1000 S-DRAPE TOWEL DRAPE 10/BX: Brand: STERI-DRAPE™

## (undated) DEVICE — TUBING SUCT 12FR MAL ALUM SHFT FN CAP VENT UNIV CONN W/ OBT

## (undated) DEVICE — DRAPE,U/ SHT,SPLIT,PLAS,STERIL: Brand: MEDLINE

## (undated) DEVICE — NEEDLE ECHOGENIC 20GA L4IN INSUL W/ 30DEG BVL EXTN SET

## (undated) DEVICE — GOWN,AURORA,NONREINFORCED,LARGE: Brand: MEDLINE

## (undated) DEVICE — STOCKINETTE,IMPERVIOUS,12X48,STERILE: Brand: MEDLINE

## (undated) DEVICE — TRAY NRV BLK 1 CATHETER CONN CLMP STYL

## (undated) DEVICE — CANNULA IV 18GA L15IN BLNT FILL LUERLOCK HUB MJCT

## (undated) DEVICE — GOWN,AURORA,NONRNF,XL,30/CS: Brand: MEDLINE

## (undated) DEVICE — NEEDLE SPNL L3.5IN PNK HUB S STL REG WALL FIT STYL W/ QNCKE

## (undated) DEVICE — DRAPE,SHOULDER,BEACH CHAIR,STERILE: Brand: MEDLINE

## (undated) DEVICE — GLOVE ORANGE PI 8 1/2   MSG9085

## (undated) DEVICE — GLOVE ORANGE PI 8   MSG9080

## (undated) DEVICE — INFUSION PUMP KIT FOR PAIN MGMT A19] INFUSYSTEM]

## (undated) DEVICE — BANDAGE,GAUZE,CONFORMING,4"X75",STRL,LF: Brand: MEDLINE

## (undated) DEVICE — CHLORAPREP 26ML ORANGE

## (undated) DEVICE — PROTECTOR ULN NRV PUR FOAM HK LOOP STRP ANATOMICALLY

## (undated) DEVICE — ZIMMER® STERILE DISPOSABLE TOURNIQUET CUFF WITH PROTECTIVE SLEEVE AND PLC, DUAL PORT, SINGLE BLADDER, 18 IN. (46 CM)

## (undated) DEVICE — Z DISCONTINUED USE 2423037 APPLICATOR MEDICATED 3 CC CHLORHEXIDINE GLUC 2% CHLORAPREP

## (undated) DEVICE — SUTURE VIC + ANTIBACT NDL MO-4 1-0 27 VCP437H

## (undated) DEVICE — BANDAGE COBAN 4 IN COMPR W4INXL5YD FOAM COHESIVE QUIK STK SELF ADH SFT

## (undated) DEVICE — STOCKINETTE,DOUBLE PLY,4X48,STERILE: Brand: MEDLINE

## (undated) DEVICE — GEL US 20GM NONIRRITATING OVERWRAPPED FILE PCH TRNSMIT

## (undated) DEVICE — GARMENT,MEDLINE,DVT,INT,CALF,MED, GEN2: Brand: MEDLINE

## (undated) DEVICE — STANDARD HYPODERMIC NEEDLE,POLYPROPYLENE HUB: Brand: MONOJECT

## (undated) DEVICE — TRAY CATHETER 16FR F INCLUDE BARDX IC COMPLT CARE DRNGE BG

## (undated) DEVICE — ZIPPERED TOGA, 2X LARGE: Brand: FLYTE

## (undated) DEVICE — SKIN AFFIX SURG ADHESIVE 72/CS 0.55ML: Brand: MEDLINE

## (undated) DEVICE — PACK PROCEDURE SURG SVMMC TOT KNEE

## (undated) DEVICE — MEDI-VAC SUCTION HANDLE REGULAR CAPACITY: Brand: CARDINAL HEALTH

## (undated) DEVICE — SINGLE USE DEVICE INTENDED TO COVER EXPOSED ENDS OF ORTHOPEDIC PIN AND K-WIRES TO HELP PROTECT THE EXPOSED WIRE FROM SNAGGING ON CLOTHING.: Brand: OXBORO™ PIN COVER

## (undated) DEVICE — 3 ML SYRINGE LUER-LOCK TIP: Brand: MONOJECT